# Patient Record
Sex: MALE | Race: WHITE | NOT HISPANIC OR LATINO | Employment: FULL TIME | URBAN - METROPOLITAN AREA
[De-identification: names, ages, dates, MRNs, and addresses within clinical notes are randomized per-mention and may not be internally consistent; named-entity substitution may affect disease eponyms.]

---

## 2021-06-01 ENCOUNTER — APPOINTMENT (OUTPATIENT)
Dept: ULTRASOUND IMAGING | Facility: HOSPITAL | Age: 36
End: 2021-06-01

## 2021-06-01 ENCOUNTER — APPOINTMENT (OUTPATIENT)
Dept: GENERAL RADIOLOGY | Facility: HOSPITAL | Age: 36
End: 2021-06-01

## 2021-06-01 ENCOUNTER — APPOINTMENT (OUTPATIENT)
Dept: CT IMAGING | Facility: HOSPITAL | Age: 36
End: 2021-06-01

## 2021-06-01 ENCOUNTER — HOSPITAL ENCOUNTER (INPATIENT)
Facility: HOSPITAL | Age: 36
LOS: 3 days | Discharge: HOME | End: 2021-06-04
Attending: EMERGENCY MEDICINE | Admitting: INTERNAL MEDICINE

## 2021-06-01 ENCOUNTER — APPOINTMENT (OUTPATIENT)
Dept: CARDIOLOGY | Facility: HOSPITAL | Age: 36
End: 2021-06-01

## 2021-06-01 DIAGNOSIS — K70.30 CIRRHOSIS WITH ALCOHOLISM (HCC): Primary | ICD-10-CM

## 2021-06-01 DIAGNOSIS — K70.31 ASCITES DUE TO ALCOHOLIC CIRRHOSIS (HCC): ICD-10-CM

## 2021-06-01 PROBLEM — F10.10 ALCOHOL ABUSE: Chronic | Status: ACTIVE | Noted: 2021-06-01

## 2021-06-01 PROBLEM — E80.6 HYPERBILIRUBINEMIA: Status: ACTIVE | Noted: 2021-06-01

## 2021-06-01 PROBLEM — N39.0 ACUTE UTI (URINARY TRACT INFECTION): Status: ACTIVE | Noted: 2021-06-01

## 2021-06-01 PROBLEM — R94.31 PROLONGED QT INTERVAL: Status: ACTIVE | Noted: 2021-06-01

## 2021-06-01 PROBLEM — D53.9 MACROCYTIC ANEMIA: Status: ACTIVE | Noted: 2021-06-01

## 2021-06-01 PROBLEM — D69.6 THROMBOCYTOPENIA (HCC): Status: ACTIVE | Noted: 2021-06-01

## 2021-06-01 PROBLEM — D68.9 COAGULOPATHY: Status: ACTIVE | Noted: 2021-06-01

## 2021-06-01 PROBLEM — R60.1 ANASARCA: Status: ACTIVE | Noted: 2021-06-01

## 2021-06-01 LAB
ALBUMIN SERPL-MCNC: 2.4 G/DL (ref 3.5–5.2)
ALBUMIN/GLOB SERPL: 0.5 G/DL
ALP SERPL-CCNC: 173 U/L (ref 39–117)
ALT SERPL W P-5'-P-CCNC: 36 U/L (ref 1–41)
AMMONIA BLD-SCNC: 25 UMOL/L (ref 16–60)
ANION GAP SERPL CALCULATED.3IONS-SCNC: 10 MMOL/L (ref 5–15)
APTT PPP: 33.6 SECONDS (ref 24–31)
AST SERPL-CCNC: 56 U/L (ref 1–40)
BACTERIA UR QL AUTO: ABNORMAL /HPF
BASOPHILS # BLD AUTO: 0 10*3/MM3 (ref 0–0.2)
BASOPHILS NFR BLD AUTO: 0.4 % (ref 0–1.5)
BH CV ECHO MEAS - ACS: 2.5 CM
BH CV ECHO MEAS - AO MAX PG (FULL): 14.2 MMHG
BH CV ECHO MEAS - AO MAX PG: 25.6 MMHG
BH CV ECHO MEAS - AO MEAN PG (FULL): 6.5 MMHG
BH CV ECHO MEAS - AO MEAN PG: 15 MMHG
BH CV ECHO MEAS - AO ROOT AREA (BSA CORRECTED): 1.1
BH CV ECHO MEAS - AO ROOT AREA: 6.1 CM^2
BH CV ECHO MEAS - AO ROOT DIAM: 2.8 CM
BH CV ECHO MEAS - AO V2 MAX: 253.1 CM/SEC
BH CV ECHO MEAS - AO V2 MEAN: 183.4 CM/SEC
BH CV ECHO MEAS - AO V2 VTI: 41.9 CM
BH CV ECHO MEAS - AORTIC HR: 120.3 BPM
BH CV ECHO MEAS - AORTIC R-R: 0.5 SEC
BH CV ECHO MEAS - ASC AORTA: 2.7 CM
BH CV ECHO MEAS - AVA(I,A): 3 CM^2
BH CV ECHO MEAS - AVA(I,D): 3 CM^2
BH CV ECHO MEAS - AVA(V,A): 2.2 CM^2
BH CV ECHO MEAS - AVA(V,D): 2.2 CM^2
BH CV ECHO MEAS - BSA(HAYCOCK): 2.7 M^2
BH CV ECHO MEAS - BSA: 2.6 M^2
BH CV ECHO MEAS - BZI_BMI: 39.4 KILOGRAMS/M^2
BH CV ECHO MEAS - BZI_METRIC_HEIGHT: 185.4 CM
BH CV ECHO MEAS - BZI_METRIC_WEIGHT: 135.6 KG
BH CV ECHO MEAS - CI(AO): 12 L/MIN/M^2
BH CV ECHO MEAS - CI(LVOT): 5.9 L/MIN/M^2
BH CV ECHO MEAS - CO(AO): 30.7 L/MIN
BH CV ECHO MEAS - CO(LVOT): 15.1 L/MIN
BH CV ECHO MEAS - EDV(CUBED): 34 ML
BH CV ECHO MEAS - EDV(MOD-SP2): 65.5 ML
BH CV ECHO MEAS - EDV(MOD-SP4): 76.3 ML
BH CV ECHO MEAS - EDV(TEICH): 42.2 ML
BH CV ECHO MEAS - EF(CUBED): 68 %
BH CV ECHO MEAS - EF(MOD-BP): 57 %
BH CV ECHO MEAS - EF(MOD-SP2): 63.1 %
BH CV ECHO MEAS - EF(MOD-SP4): 60.1 %
BH CV ECHO MEAS - EF(TEICH): 60.9 %
BH CV ECHO MEAS - ESV(CUBED): 10.9 ML
BH CV ECHO MEAS - ESV(MOD-SP2): 24.2 ML
BH CV ECHO MEAS - ESV(MOD-SP4): 30.4 ML
BH CV ECHO MEAS - ESV(TEICH): 16.5 ML
BH CV ECHO MEAS - FS: 31.6 %
BH CV ECHO MEAS - IVS/LVPW: 0.93
BH CV ECHO MEAS - IVSD: 1.5 CM
BH CV ECHO MEAS - LA DIMENSION(2D): 4.3 CM
BH CV ECHO MEAS - LV DIASTOLIC VOL/BSA (35-75): 29.9 ML/M^2
BH CV ECHO MEAS - LV MASS(C)D: 192.5 GRAMS
BH CV ECHO MEAS - LV MASS(C)DI: 75.4 GRAMS/M^2
BH CV ECHO MEAS - LV MAX PG: 11.4 MMHG
BH CV ECHO MEAS - LV MEAN PG: 8.5 MMHG
BH CV ECHO MEAS - LV SYSTOLIC VOL/BSA (12-30): 11.9 ML/M^2
BH CV ECHO MEAS - LV V1 MAX: 168.9 CM/SEC
BH CV ECHO MEAS - LV V1 MEAN: 141.6 CM/SEC
BH CV ECHO MEAS - LV V1 VTI: 38.8 CM
BH CV ECHO MEAS - LVIDD: 3.2 CM
BH CV ECHO MEAS - LVIDS: 2.2 CM
BH CV ECHO MEAS - LVOT AREA: 3.2 CM^2
BH CV ECHO MEAS - LVOT DIAM: 2 CM
BH CV ECHO MEAS - LVPWD: 1.6 CM
BH CV ECHO MEAS - MV A MAX VEL: 124.1 CM/SEC
BH CV ECHO MEAS - MV DEC SLOPE: 404.3 CM/SEC^2
BH CV ECHO MEAS - MV DEC TIME: 0.25 SEC
BH CV ECHO MEAS - MV E MAX VEL: 101.2 CM/SEC
BH CV ECHO MEAS - MV E/A: 0.82
BH CV ECHO MEAS - MV MAX PG: 6 MMHG
BH CV ECHO MEAS - MV MEAN PG: 3.6 MMHG
BH CV ECHO MEAS - MV V2 MAX: 123 CM/SEC
BH CV ECHO MEAS - MV V2 MEAN: 91.2 CM/SEC
BH CV ECHO MEAS - MV V2 VTI: 20.5 CM
BH CV ECHO MEAS - MVA(VTI): 6.1 CM^2
BH CV ECHO MEAS - PA ACC TIME: 0.12 SEC
BH CV ECHO MEAS - PA MAX PG (FULL): 7.3 MMHG
BH CV ECHO MEAS - PA MAX PG: 12.5 MMHG
BH CV ECHO MEAS - PA MEAN PG (FULL): 4.5 MMHG
BH CV ECHO MEAS - PA MEAN PG: 7.6 MMHG
BH CV ECHO MEAS - PA PR(ACCEL): 24.2 MMHG
BH CV ECHO MEAS - PA V2 MAX: 176.9 CM/SEC
BH CV ECHO MEAS - PA V2 MEAN: 129.3 CM/SEC
BH CV ECHO MEAS - PA V2 VTI: 29 CM
BH CV ECHO MEAS - PULM DIAS VEL: 57.8 CM/SEC
BH CV ECHO MEAS - PULM S/D: 1.5
BH CV ECHO MEAS - PULM SYS VEL: 84.2 CM/SEC
BH CV ECHO MEAS - PVA(I,A): 8.3 CM^2
BH CV ECHO MEAS - PVA(I,D): 8.3 CM^2
BH CV ECHO MEAS - PVA(V,A): 6.9 CM^2
BH CV ECHO MEAS - PVA(V,D): 6.9 CM^2
BH CV ECHO MEAS - QP/QS: 1.9
BH CV ECHO MEAS - RAP SYSTOLE: 3 MMHG
BH CV ECHO MEAS - RV MAX PG: 5.2 MMHG
BH CV ECHO MEAS - RV MEAN PG: 3.1 MMHG
BH CV ECHO MEAS - RV V1 MAX: 113.7 CM/SEC
BH CV ECHO MEAS - RV V1 MEAN: 79.7 CM/SEC
BH CV ECHO MEAS - RV V1 VTI: 22.5 CM
BH CV ECHO MEAS - RVDD: 2.8 CM
BH CV ECHO MEAS - RVOT AREA: 10.8 CM^2
BH CV ECHO MEAS - RVOT DIAM: 3.7 CM
BH CV ECHO MEAS - RVSP: 53.1 MMHG
BH CV ECHO MEAS - SI(AO): 99.8 ML/M^2
BH CV ECHO MEAS - SI(CUBED): 9.1 ML/M^2
BH CV ECHO MEAS - SI(LVOT): 49.1 ML/M^2
BH CV ECHO MEAS - SI(MOD-SP2): 16.2 ML/M^2
BH CV ECHO MEAS - SI(MOD-SP4): 18 ML/M^2
BH CV ECHO MEAS - SI(TEICH): 10.1 ML/M^2
BH CV ECHO MEAS - SV(AO): 254.9 ML
BH CV ECHO MEAS - SV(CUBED): 23.1 ML
BH CV ECHO MEAS - SV(LVOT): 125.3 ML
BH CV ECHO MEAS - SV(MOD-SP2): 41.3 ML
BH CV ECHO MEAS - SV(MOD-SP4): 45.9 ML
BH CV ECHO MEAS - SV(RVOT): 242.4 ML
BH CV ECHO MEAS - SV(TEICH): 25.7 ML
BH CV ECHO MEAS - TR MAX VEL: 353.7 CM/SEC
BILIRUB SERPL-MCNC: 4.8 MG/DL (ref 0–1.2)
BILIRUB UR QL STRIP: ABNORMAL
BUN SERPL-MCNC: 22 MG/DL (ref 6–20)
BUN/CREAT SERPL: 17.9 (ref 7–25)
CALCIUM SPEC-SCNC: 8.7 MG/DL (ref 8.6–10.5)
CHLORIDE SERPL-SCNC: 101 MMOL/L (ref 98–107)
CLARITY UR: ABNORMAL
CO2 SERPL-SCNC: 23 MMOL/L (ref 22–29)
COLOR UR: ABNORMAL
CREAT SERPL-MCNC: 1.23 MG/DL (ref 0.76–1.27)
DEPRECATED RDW RBC AUTO: 55.1 FL (ref 37–54)
EOSINOPHIL # BLD AUTO: 0.5 10*3/MM3 (ref 0–0.4)
EOSINOPHIL NFR BLD AUTO: 7.9 % (ref 0.3–6.2)
ERYTHROCYTE [DISTWIDTH] IN BLOOD BY AUTOMATED COUNT: 15.6 % (ref 12.3–15.4)
ETHANOL UR QL: <0.01 %
FOLATE SERPL-MCNC: 4.01 NG/ML (ref 4.78–24.2)
GFR SERPL CREATININE-BSD FRML MDRD: 67 ML/MIN/1.73
GLOBULIN UR ELPH-MCNC: 5.2 GM/DL
GLUCOSE SERPL-MCNC: 92 MG/DL (ref 65–99)
GLUCOSE UR STRIP-MCNC: NEGATIVE MG/DL
HCT VFR BLD AUTO: 32 % (ref 37.5–51)
HGB BLD-MCNC: 11.1 G/DL (ref 13–17.7)
HGB UR QL STRIP.AUTO: ABNORMAL
HOLD SPECIMEN: NORMAL
HOLD SPECIMEN: NORMAL
HYALINE CASTS UR QL AUTO: ABNORMAL /LPF
INR PPP: 1.41 (ref 0.93–1.1)
KETONES UR QL STRIP: ABNORMAL
LEUKOCYTE ESTERASE UR QL STRIP.AUTO: ABNORMAL
LIPASE SERPL-CCNC: 72 U/L (ref 13–60)
LYMPHOCYTES # BLD AUTO: 0.8 10*3/MM3 (ref 0.7–3.1)
LYMPHOCYTES NFR BLD AUTO: 13.1 % (ref 19.6–45.3)
MAGNESIUM SERPL-MCNC: 1.9 MG/DL (ref 1.6–2.6)
MCH RBC QN AUTO: 35.6 PG (ref 26.6–33)
MCHC RBC AUTO-ENTMCNC: 34.6 G/DL (ref 31.5–35.7)
MCV RBC AUTO: 103 FL (ref 79–97)
MONOCYTES # BLD AUTO: 1 10*3/MM3 (ref 0.1–0.9)
MONOCYTES NFR BLD AUTO: 15.7 % (ref 5–12)
NEUTROPHILS NFR BLD AUTO: 4 10*3/MM3 (ref 1.7–7)
NEUTROPHILS NFR BLD AUTO: 62.9 % (ref 42.7–76)
NITRITE UR QL STRIP: POSITIVE
NRBC BLD AUTO-RTO: 0.1 /100 WBC (ref 0–0.2)
PH UR STRIP.AUTO: 5.5 [PH] (ref 5–8)
PLATELET # BLD AUTO: 63 10*3/MM3 (ref 140–450)
PMV BLD AUTO: 7.8 FL (ref 6–12)
POTASSIUM SERPL-SCNC: 4.5 MMOL/L (ref 3.5–5.2)
PROCALCITONIN SERPL-MCNC: 0.11 NG/ML (ref 0–0.25)
PROT SERPL-MCNC: 7.6 G/DL (ref 6–8.5)
PROT UR QL STRIP: ABNORMAL
PROTHROMBIN TIME: 15.3 SECONDS (ref 9.6–11.7)
RBC # BLD AUTO: 3.11 10*6/MM3 (ref 4.14–5.8)
RBC # UR: ABNORMAL /HPF
REF LAB TEST METHOD: ABNORMAL
SODIUM SERPL-SCNC: 134 MMOL/L (ref 136–145)
SP GR UR STRIP: >=1.03 (ref 1–1.03)
SQUAMOUS #/AREA URNS HPF: ABNORMAL /HPF
TSH SERPL DL<=0.05 MIU/L-ACNC: 4.11 UIU/ML (ref 0.27–4.2)
UROBILINOGEN UR QL STRIP: ABNORMAL
VIT B12 BLD-MCNC: 1309 PG/ML (ref 211–946)
WBC # BLD AUTO: 6.3 10*3/MM3 (ref 3.4–10.8)
WBC UR QL AUTO: ABNORMAL /HPF
WHOLE BLOOD HOLD SPECIMEN: NORMAL

## 2021-06-01 PROCEDURE — 87086 URINE CULTURE/COLONY COUNT: CPT | Performed by: NURSE PRACTITIONER

## 2021-06-01 PROCEDURE — 74176 CT ABD & PELVIS W/O CONTRAST: CPT

## 2021-06-01 PROCEDURE — 49083 ABD PARACENTESIS W/IMAGING: CPT | Performed by: HOSPITALIST

## 2021-06-01 PROCEDURE — 76705 ECHO EXAM OF ABDOMEN: CPT

## 2021-06-01 PROCEDURE — 84443 ASSAY THYROID STIM HORMONE: CPT | Performed by: NURSE PRACTITIONER

## 2021-06-01 PROCEDURE — 81340 TRB@ GENE REARRANGE AMPLIFY: CPT

## 2021-06-01 PROCEDURE — 25010000002 CEFTRIAXONE PER 250 MG: Performed by: NURSE PRACTITIONER

## 2021-06-01 PROCEDURE — 93306 TTE W/DOPPLER COMPLETE: CPT | Performed by: INTERNAL MEDICINE

## 2021-06-01 PROCEDURE — 84145 PROCALCITONIN (PCT): CPT | Performed by: NURSE PRACTITIONER

## 2021-06-01 PROCEDURE — 82077 ASSAY SPEC XCP UR&BREATH IA: CPT | Performed by: EMERGENCY MEDICINE

## 2021-06-01 PROCEDURE — 85730 THROMBOPLASTIN TIME PARTIAL: CPT | Performed by: EMERGENCY MEDICINE

## 2021-06-01 PROCEDURE — 93306 TTE W/DOPPLER COMPLETE: CPT

## 2021-06-01 PROCEDURE — 93005 ELECTROCARDIOGRAM TRACING: CPT | Performed by: EMERGENCY MEDICINE

## 2021-06-01 PROCEDURE — 82607 VITAMIN B-12: CPT | Performed by: NURSE PRACTITIONER

## 2021-06-01 PROCEDURE — 81342 TRG GENE REARRANGEMENT ANAL: CPT

## 2021-06-01 PROCEDURE — 25010000002 FUROSEMIDE PER 20 MG: Performed by: NURSE PRACTITIONER

## 2021-06-01 PROCEDURE — 87070 CULTURE OTHR SPECIMN AEROBIC: CPT | Performed by: NURSE PRACTITIONER

## 2021-06-01 PROCEDURE — 83735 ASSAY OF MAGNESIUM: CPT | Performed by: EMERGENCY MEDICINE

## 2021-06-01 PROCEDURE — 88184 FLOWCYTOMETRY/ TC 1 MARKER: CPT

## 2021-06-01 PROCEDURE — 88185 FLOWCYTOMETRY/TC ADD-ON: CPT

## 2021-06-01 PROCEDURE — 71045 X-RAY EXAM CHEST 1 VIEW: CPT

## 2021-06-01 PROCEDURE — 83690 ASSAY OF LIPASE: CPT | Performed by: EMERGENCY MEDICINE

## 2021-06-01 PROCEDURE — 87075 CULTR BACTERIA EXCEPT BLOOD: CPT | Performed by: NURSE PRACTITIONER

## 2021-06-01 PROCEDURE — 87205 SMEAR GRAM STAIN: CPT | Performed by: NURSE PRACTITIONER

## 2021-06-01 PROCEDURE — 85025 COMPLETE CBC W/AUTO DIFF WBC: CPT | Performed by: EMERGENCY MEDICINE

## 2021-06-01 PROCEDURE — 99284 EMERGENCY DEPT VISIT MOD MDM: CPT

## 2021-06-01 PROCEDURE — 82140 ASSAY OF AMMONIA: CPT | Performed by: NURSE PRACTITIONER

## 2021-06-01 PROCEDURE — 99223 1ST HOSP IP/OBS HIGH 75: CPT | Performed by: INTERNAL MEDICINE

## 2021-06-01 PROCEDURE — 85610 PROTHROMBIN TIME: CPT | Performed by: EMERGENCY MEDICINE

## 2021-06-01 PROCEDURE — 82746 ASSAY OF FOLIC ACID SERUM: CPT | Performed by: NURSE PRACTITIONER

## 2021-06-01 PROCEDURE — 80053 COMPREHEN METABOLIC PANEL: CPT | Performed by: EMERGENCY MEDICINE

## 2021-06-01 PROCEDURE — 0W9G3ZZ DRAINAGE OF PERITONEAL CAVITY, PERCUTANEOUS APPROACH: ICD-10-PCS | Performed by: HOSPITALIST

## 2021-06-01 PROCEDURE — 81001 URINALYSIS AUTO W/SCOPE: CPT | Performed by: EMERGENCY MEDICINE

## 2021-06-01 RX ORDER — DIPHENOXYLATE HYDROCHLORIDE AND ATROPINE SULFATE 2.5; .025 MG/1; MG/1
1 TABLET ORAL DAILY
Status: DISCONTINUED | OUTPATIENT
Start: 2021-06-01 | End: 2021-06-04 | Stop reason: HOSPADM

## 2021-06-01 RX ORDER — POTASSIUM CHLORIDE 1.5 G/1.77G
40 POWDER, FOR SOLUTION ORAL AS NEEDED
Status: DISCONTINUED | OUTPATIENT
Start: 2021-06-01 | End: 2021-06-04 | Stop reason: HOSPADM

## 2021-06-01 RX ORDER — ALBUMIN (HUMAN) 12.5 G/50ML
112.5 SOLUTION INTRAVENOUS ONCE
Status: COMPLETED | OUTPATIENT
Start: 2021-06-02 | End: 2021-06-02

## 2021-06-01 RX ORDER — ACETAMINOPHEN 325 MG/1
650 TABLET ORAL EVERY 4 HOURS PRN
Status: DISCONTINUED | OUTPATIENT
Start: 2021-06-01 | End: 2021-06-04 | Stop reason: HOSPADM

## 2021-06-01 RX ORDER — ALBUMIN (HUMAN) 12.5 G/50ML
37.5 SOLUTION INTRAVENOUS ONCE
Status: DISCONTINUED | OUTPATIENT
Start: 2021-06-01 | End: 2021-06-01

## 2021-06-01 RX ORDER — SODIUM CHLORIDE 0.9 % (FLUSH) 0.9 %
10 SYRINGE (ML) INJECTION EVERY 12 HOURS SCHEDULED
Status: DISCONTINUED | OUTPATIENT
Start: 2021-06-01 | End: 2021-06-04 | Stop reason: HOSPADM

## 2021-06-01 RX ORDER — ALBUMIN (HUMAN) 12.5 G/50ML
62.5 SOLUTION INTRAVENOUS ONCE
Status: COMPLETED | OUTPATIENT
Start: 2021-06-02 | End: 2021-06-02

## 2021-06-01 RX ORDER — LORAZEPAM 2 MG/ML
2 INJECTION INTRAMUSCULAR
Status: DISCONTINUED | OUTPATIENT
Start: 2021-06-01 | End: 2021-06-04 | Stop reason: HOSPADM

## 2021-06-01 RX ORDER — MAGNESIUM SULFATE 1 G/100ML
1 INJECTION INTRAVENOUS AS NEEDED
Status: DISCONTINUED | OUTPATIENT
Start: 2021-06-01 | End: 2021-06-04 | Stop reason: HOSPADM

## 2021-06-01 RX ORDER — ALBUMIN (HUMAN) 12.5 G/50ML
50 SOLUTION INTRAVENOUS ONCE
Status: COMPLETED | OUTPATIENT
Start: 2021-06-02 | End: 2021-06-02

## 2021-06-01 RX ORDER — SODIUM CHLORIDE 0.9 % (FLUSH) 0.9 %
10 SYRINGE (ML) INJECTION AS NEEDED
Status: DISCONTINUED | OUTPATIENT
Start: 2021-06-01 | End: 2021-06-04 | Stop reason: HOSPADM

## 2021-06-01 RX ORDER — POTASSIUM CHLORIDE 7.45 MG/ML
10 INJECTION INTRAVENOUS
Status: DISCONTINUED | OUTPATIENT
Start: 2021-06-01 | End: 2021-06-04 | Stop reason: HOSPADM

## 2021-06-01 RX ORDER — ALBUMIN (HUMAN) 12.5 G/50ML
100 SOLUTION INTRAVENOUS ONCE
Status: DISCONTINUED | OUTPATIENT
Start: 2021-06-01 | End: 2021-06-01

## 2021-06-01 RX ORDER — LORAZEPAM 2 MG/ML
1 INJECTION INTRAMUSCULAR
Status: DISCONTINUED | OUTPATIENT
Start: 2021-06-01 | End: 2021-06-04 | Stop reason: HOSPADM

## 2021-06-01 RX ORDER — FOLIC ACID 1 MG/1
1 TABLET ORAL DAILY
Status: DISCONTINUED | OUTPATIENT
Start: 2021-06-01 | End: 2021-06-04 | Stop reason: HOSPADM

## 2021-06-01 RX ORDER — LACTULOSE 10 G/15ML
10 SOLUTION ORAL DAILY
Status: DISCONTINUED | OUTPATIENT
Start: 2021-06-01 | End: 2021-06-04 | Stop reason: HOSPADM

## 2021-06-01 RX ORDER — POTASSIUM CHLORIDE 20 MEQ/1
40 TABLET, EXTENDED RELEASE ORAL AS NEEDED
Status: DISCONTINUED | OUTPATIENT
Start: 2021-06-01 | End: 2021-06-04 | Stop reason: HOSPADM

## 2021-06-01 RX ORDER — ALBUMIN (HUMAN) 12.5 G/50ML
100 SOLUTION INTRAVENOUS ONCE
Status: COMPLETED | OUTPATIENT
Start: 2021-06-02 | End: 2021-06-02

## 2021-06-01 RX ORDER — ALBUMIN (HUMAN) 12.5 G/50ML
112.5 SOLUTION INTRAVENOUS ONCE
Status: DISCONTINUED | OUTPATIENT
Start: 2021-06-01 | End: 2021-06-01

## 2021-06-01 RX ORDER — ALBUMIN (HUMAN) 12.5 G/50ML
75 SOLUTION INTRAVENOUS ONCE
Status: COMPLETED | OUTPATIENT
Start: 2021-06-02 | End: 2021-06-02

## 2021-06-01 RX ORDER — CHOLECALCIFEROL (VITAMIN D3) 125 MCG
5 CAPSULE ORAL NIGHTLY PRN
Status: DISCONTINUED | OUTPATIENT
Start: 2021-06-01 | End: 2021-06-04 | Stop reason: HOSPADM

## 2021-06-01 RX ORDER — ALBUMIN (HUMAN) 12.5 G/50ML
37.5 SOLUTION INTRAVENOUS ONCE
Status: COMPLETED | OUTPATIENT
Start: 2021-06-02 | End: 2021-06-02

## 2021-06-01 RX ORDER — LORAZEPAM 1 MG/1
2 TABLET ORAL
Status: DISCONTINUED | OUTPATIENT
Start: 2021-06-01 | End: 2021-06-04 | Stop reason: HOSPADM

## 2021-06-01 RX ORDER — ALBUMIN (HUMAN) 12.5 G/50ML
50 SOLUTION INTRAVENOUS ONCE
Status: DISCONTINUED | OUTPATIENT
Start: 2021-06-01 | End: 2021-06-01

## 2021-06-01 RX ORDER — ALBUMIN (HUMAN) 12.5 G/50ML
87.5 SOLUTION INTRAVENOUS ONCE
Status: COMPLETED | OUTPATIENT
Start: 2021-06-02 | End: 2021-06-02

## 2021-06-01 RX ORDER — NITROGLYCERIN 0.4 MG/1
0.4 TABLET SUBLINGUAL
Status: DISCONTINUED | OUTPATIENT
Start: 2021-06-01 | End: 2021-06-04 | Stop reason: HOSPADM

## 2021-06-01 RX ORDER — ALBUMIN (HUMAN) 12.5 G/50ML
62.5 SOLUTION INTRAVENOUS ONCE
Status: DISCONTINUED | OUTPATIENT
Start: 2021-06-01 | End: 2021-06-01

## 2021-06-01 RX ORDER — ACETAMINOPHEN 650 MG/1
650 SUPPOSITORY RECTAL EVERY 4 HOURS PRN
Status: DISCONTINUED | OUTPATIENT
Start: 2021-06-01 | End: 2021-06-04 | Stop reason: HOSPADM

## 2021-06-01 RX ORDER — ALUMINA, MAGNESIA, AND SIMETHICONE 2400; 2400; 240 MG/30ML; MG/30ML; MG/30ML
15 SUSPENSION ORAL EVERY 6 HOURS PRN
Status: DISCONTINUED | OUTPATIENT
Start: 2021-06-01 | End: 2021-06-04 | Stop reason: HOSPADM

## 2021-06-01 RX ORDER — ACETAMINOPHEN 160 MG/5ML
650 SOLUTION ORAL EVERY 4 HOURS PRN
Status: DISCONTINUED | OUTPATIENT
Start: 2021-06-01 | End: 2021-06-04 | Stop reason: HOSPADM

## 2021-06-01 RX ORDER — ONDANSETRON 4 MG/1
4 TABLET, FILM COATED ORAL EVERY 6 HOURS PRN
Status: DISCONTINUED | OUTPATIENT
Start: 2021-06-01 | End: 2021-06-04 | Stop reason: HOSPADM

## 2021-06-01 RX ORDER — ONDANSETRON 2 MG/ML
4 INJECTION INTRAMUSCULAR; INTRAVENOUS EVERY 6 HOURS PRN
Status: DISCONTINUED | OUTPATIENT
Start: 2021-06-01 | End: 2021-06-04 | Stop reason: HOSPADM

## 2021-06-01 RX ORDER — ALBUMIN (HUMAN) 12.5 G/50ML
87.5 SOLUTION INTRAVENOUS ONCE
Status: DISCONTINUED | OUTPATIENT
Start: 2021-06-01 | End: 2021-06-01

## 2021-06-01 RX ORDER — KETOROLAC TROMETHAMINE 15 MG/ML
15 INJECTION, SOLUTION INTRAMUSCULAR; INTRAVENOUS EVERY 6 HOURS PRN
Status: ACTIVE | OUTPATIENT
Start: 2021-06-01 | End: 2021-06-03

## 2021-06-01 RX ORDER — MAGNESIUM SULFATE HEPTAHYDRATE 40 MG/ML
2 INJECTION, SOLUTION INTRAVENOUS AS NEEDED
Status: DISCONTINUED | OUTPATIENT
Start: 2021-06-01 | End: 2021-06-04 | Stop reason: HOSPADM

## 2021-06-01 RX ORDER — ALBUMIN (HUMAN) 12.5 G/50ML
75 SOLUTION INTRAVENOUS ONCE
Status: DISCONTINUED | OUTPATIENT
Start: 2021-06-01 | End: 2021-06-01

## 2021-06-01 RX ORDER — FUROSEMIDE 10 MG/ML
40 INJECTION INTRAMUSCULAR; INTRAVENOUS EVERY 8 HOURS
Status: DISCONTINUED | OUTPATIENT
Start: 2021-06-01 | End: 2021-06-02

## 2021-06-01 RX ORDER — LORAZEPAM 1 MG/1
1 TABLET ORAL
Status: DISCONTINUED | OUTPATIENT
Start: 2021-06-01 | End: 2021-06-04 | Stop reason: HOSPADM

## 2021-06-01 RX ADMIN — FOLIC ACID 1 MG: 1 TABLET ORAL at 17:31

## 2021-06-01 RX ADMIN — Medication 10 ML: at 20:32

## 2021-06-01 RX ADMIN — FUROSEMIDE 40 MG: 10 INJECTION, SOLUTION INTRAMUSCULAR; INTRAVENOUS at 17:32

## 2021-06-01 RX ADMIN — Medication 10 ML: at 17:33

## 2021-06-01 RX ADMIN — LACTULOSE 10 G: 20 SOLUTION ORAL at 17:32

## 2021-06-01 RX ADMIN — Medication 100 MG: at 17:33

## 2021-06-01 RX ADMIN — CEFTRIAXONE SODIUM 1 G: 1 INJECTION, POWDER, FOR SOLUTION INTRAMUSCULAR; INTRAVENOUS at 17:39

## 2021-06-01 RX ADMIN — THERA TABS 1 TABLET: TAB at 17:33

## 2021-06-01 NOTE — PROCEDURES
Ultrasound guided paracentesis    Date/Time: 6/1/2021 2:30 PM  Performed by: Bentley Best MD  Authorized by: Eneida Daniel APRN   Initial or subsequent exam: initial  Procedure purpose: diagnostic  Indications: abdominal discomfort secondary to ascites and new onset ascites  Anesthesia: local infiltration    Anesthesia:  Local Anesthetic: lidocaine 1% without epinephrine  Preparation: Patient was prepped and draped in the usual sterile fashion.  Needle gauge: 20  Ultrasound guidance: yes  Puncture site: right lower quadrant  Fluid appearance: serous  Dressing: 4x4 sterile gauze  Patient tolerance: patient tolerated the procedure well with no immediate complications  Comments: 40 ml of fluid taken as per ordering physician request for diagnostic analysis.

## 2021-06-01 NOTE — ED PROVIDER NOTES
Subjective   History of Present Illness  5-year-old male has noticed swelling in his abdomen and his legs over the past 2 to 3 weeks and has had about a 30 pound weight gain.  The patient denies any history of cirrhosis but does have a history of heavy drinking.  Patient has noticed no jaundice.  He denies any nausea vomiting fever or chills.  He denies any other medical problems  Review of Systems   Constitutional: Positive for activity change, appetite change and fatigue.   HENT: Negative.    Eyes: Negative.    Respiratory: Positive for shortness of breath.    Cardiovascular: Negative.    Gastrointestinal: Positive for abdominal distention and abdominal pain.   Endocrine: Negative.    Genitourinary: Negative.    Musculoskeletal: Positive for myalgias.   Skin: Negative.    Allergic/Immunologic: Negative.    Neurological: Negative.    Hematological: Negative.    Psychiatric/Behavioral: Negative.        No past medical history on file.    No Known Allergies    No past surgical history on file.    No family history on file.    Social History     Socioeconomic History   • Marital status: Single     Spouse name: Not on file   • Number of children: Not on file   • Years of education: Not on file   • Highest education level: Not on file           Objective   Physical Exam  Patient is awake and alert she is afebrile vital signs are stable his heart rate is 122 the O2 sats are 100% HEENT exam shows mild scleral icterus ENT is clear the neck is supple his chest reveals some rales in the bases cardiovascular exam reveals a regular rhythm the abdomen is extremely distended with ascites the ascites prevents palpation of any of the organs.  The patient has pitting edema of both lower extremities.  He has good distal pulses.  Neurologic exam shows no focal deficit.  Procedures           ED Course           Results for orders placed or performed during the hospital encounter of 06/01/21   Comprehensive Metabolic Panel    Specimen:  Blood   Result Value Ref Range    Glucose 92 65 - 99 mg/dL    BUN 22 (H) 6 - 20 mg/dL    Creatinine 1.23 0.76 - 1.27 mg/dL    Sodium 134 (L) 136 - 145 mmol/L    Potassium 4.5 3.5 - 5.2 mmol/L    Chloride 101 98 - 107 mmol/L    CO2 23.0 22.0 - 29.0 mmol/L    Calcium 8.7 8.6 - 10.5 mg/dL    Total Protein 7.6 6.0 - 8.5 g/dL    Albumin 2.40 (L) 3.50 - 5.20 g/dL    ALT (SGPT) 36 1 - 41 U/L    AST (SGOT) 56 (H) 1 - 40 U/L    Alkaline Phosphatase 173 (H) 39 - 117 U/L    Total Bilirubin 4.8 (H) 0.0 - 1.2 mg/dL    eGFR Non African Amer 67 >60 mL/min/1.73    Globulin 5.2 gm/dL    A/G Ratio 0.5 g/dL    BUN/Creatinine Ratio 17.9 7.0 - 25.0    Anion Gap 10.0 5.0 - 15.0 mmol/L   Protime-INR    Specimen: Blood   Result Value Ref Range    Protime 15.3 (H) 9.6 - 11.7 Seconds    INR 1.41 (H) 0.93 - 1.10   aPTT    Specimen: Blood   Result Value Ref Range    PTT 33.6 (H) 24.0 - 31.0 seconds   Lipase    Specimen: Blood   Result Value Ref Range    Lipase 72 (H) 13 - 60 U/L   Urinalysis With Microscopic If Indicated (No Culture) - Urine, Clean Catch    Specimen: Urine, Clean Catch   Result Value Ref Range    Color, UA Red (A) Yellow, Straw    Appearance, UA Cloudy (A) Clear    pH, UA 5.5 5.0 - 8.0    Specific Gravity, UA >=1.030 1.005 - 1.030    Glucose, UA Negative Negative    Ketones, UA 15 mg/dL (1+) (A) Negative    Bilirubin, UA Moderate (2+) (A) Negative    Blood, UA Large (3+) (A) Negative    Protein, UA 30 mg/dL (1+) (A) Negative    Leuk Esterase, UA Small (1+) (A) Negative    Nitrite, UA Positive (A) Negative    Urobilinogen, UA 1.0 E.U./dL 0.2 - 1.0 E.U./dL   Ethanol    Specimen: Blood   Result Value Ref Range    Ethanol % <0.010 %   Magnesium    Specimen: Blood   Result Value Ref Range    Magnesium 1.9 1.6 - 2.6 mg/dL   CBC Auto Differential    Specimen: Blood   Result Value Ref Range    WBC 6.30 3.40 - 10.80 10*3/mm3    RBC 3.11 (L) 4.14 - 5.80 10*6/mm3    Hemoglobin 11.1 (L) 13.0 - 17.7 g/dL    Hematocrit 32.0 (L) 37.5 - 51.0  %    .0 (H) 79.0 - 97.0 fL    MCH 35.6 (H) 26.6 - 33.0 pg    MCHC 34.6 31.5 - 35.7 g/dL    RDW 15.6 (H) 12.3 - 15.4 %    RDW-SD 55.1 (H) 37.0 - 54.0 fl    MPV 7.8 6.0 - 12.0 fL    Platelets 63 (L) 140 - 450 10*3/mm3    Neutrophil % 62.9 42.7 - 76.0 %    Lymphocyte % 13.1 (L) 19.6 - 45.3 %    Monocyte % 15.7 (H) 5.0 - 12.0 %    Eosinophil % 7.9 (H) 0.3 - 6.2 %    Basophil % 0.4 0.0 - 1.5 %    Neutrophils, Absolute 4.00 1.70 - 7.00 10*3/mm3    Lymphocytes, Absolute 0.80 0.70 - 3.10 10*3/mm3    Monocytes, Absolute 1.00 (H) 0.10 - 0.90 10*3/mm3    Eosinophils, Absolute 0.50 (H) 0.00 - 0.40 10*3/mm3    Basophils, Absolute 0.00 0.00 - 0.20 10*3/mm3    nRBC 0.1 0.0 - 0.2 /100 WBC   Urinalysis, Microscopic Only - Urine, Clean Catch    Specimen: Urine, Clean Catch   Result Value Ref Range    RBC, UA 31-50 (A) None Seen /HPF    WBC, UA 3-5 (A) None Seen /HPF    Bacteria, UA Trace (A) None Seen /HPF    Squamous Epithelial Cells, UA None Seen None Seen, 0-2 /HPF    Hyaline Casts, UA None Seen None Seen /LPF    Methodology Manual Light Microscopy    ECG 12 Lead   Result Value Ref Range    QT Interval 382 ms   Light Blue Top   Result Value Ref Range    Extra Tube hold for add-on    Green Top (Gel)   Result Value Ref Range    Extra Tube Hold for add-ons.    Gold Top - SST   Result Value Ref Range    Extra Tube Hold for add-ons.      Medications - No data to display  CT Abdomen Pelvis Without Contrast    Result Date: 6/1/2021  Advanced cirrhosis with splenomegaly, large volume diffuse ascites and marked anasarca.  Electronically Signed By-Rocael Kinsey MD On:6/1/2021 1:23 PM This report was finalized on [id number] by  Rocael Kinsey MD.    XR Chest 1 View    Result Date: 6/1/2021  Low lung volumes without acute cardiopulmonary abnormality.  Electronically Signed By-Rocael Kinsey MD On:6/1/2021 12:02 PM This report was finalized on [id number] by  Rocael Kinsey MD.                                    MDM  The  patient's labs and CTs were consistent with cirrhosis with a large amount of ascites.  His ALT was 36 his AST was 56 his total bilirubin was 4.8.  Final diagnoses:   Cirrhosis with alcoholism (CMS/HCC)   Ascites due to alcoholic cirrhosis (CMS/HCC)       ED Disposition  ED Disposition     None          No follow-up provider specified.       Medication List      No changes were made to your prescriptions during this visit.          Yogi Morgan MD  06/01/21 9311

## 2021-06-01 NOTE — H&P
"      [facility] Medicine Services      Patient Name: Adam Alvares Jr.  : 1985  MRN: [id number]  Primary Care Physician: Lloyd, No Known  Date of admission: 2021    Patient Care Team:  Provider, No Known as PCP - General          Subjective   History Present Illness     Chief Complaint:  Swelling of legs and abdomen      Mr. Alvares is a 35 y.o. male with a history of alcohol abuse who presents to [facility] ED on 2021 complaining of swelling of his legs and abdomen. He states the swelling started approximately 4 weeks ago and has gotten progressively worse. He reports associated shortness of breath, but denies chest pain, cough, congestion, fever, or chills. He denies any exacerbating or alleviating factors. He states he quit drinking alcohol 2 weeks ago. He does not take any home medications, except an occasional Tylenol. He does not have a PCP. He denies tobacco or illicit drug use. He states for the past 10 years on average he would drink 4 alcoholic beverages 3 times per week, plus he would drink on the weekends. He reports a family history of alcoholism.     Workup in the ER was significant for hgb 11.1, Plt 63, INR 1.41, Na 134, bilirubin 4.8, albumin 2.40, alk phos 173, AST 56, lipase 76. His alcohol level was negative. His EKG showed sinus tachycardia with prolonged QT interval. His urinalysis was abnormal and culture reflexed. His CT abd/pelvis showed \"Advanced cirrhosis with splenomegaly, large volume diffuse ascites and marked anasarca.\" He was admitted for further evaluation and treatment.         Review of Systems   Constitutional: Positive for weight gain.   Cardiovascular: Positive for leg swelling. Negative for chest pain.   Respiratory: Positive for shortness of breath. Negative for cough.    Gastrointestinal: Positive for bloating, abdominal pain, constipation, diarrhea and jaundice. Negative for nausea and vomiting.   All other systems reviewed and " are negative.        Personal History     Past Medical History:   Past Medical History:   Diagnosis Date   • Alcohol abuse        Surgical History:    History reviewed. No pertinent surgical history.    Family History: family history includes Alcohol abuse in his father and another family member; Diabetes in an other family member; Heart disease in an other family member; Hyperlipidemia in an other family member; Hypertension in an other family member. Family History was reviewed.     Social History:  reports that he has never smoked. He has never used smokeless tobacco. He reports current alcohol use. He reports that he does not use drugs.      Medications:  Prior to Admission medications    Not on File       Allergies:  No Known Allergies      Objective   Objective     Vital Signs  Temp:  [97.6 °F (36.4 °C)] 97.6 °F (36.4 °C)  Heart Rate:  [115-128] 115  Resp:  [16-22] 17  BP: (119-146)/(71-86) 119/83  SpO2:  [99 %-100 %] 100 %  on   ;   Device (Oxygen Therapy): room air  Body mass index is 39.56 kg/m².    Physical Exam  Vitals reviewed.   HENT:      Head: Normocephalic.   Eyes:      General: Scleral icterus present.      Extraocular Movements: Extraocular movements intact.      Pupils: Pupils are equal, round, and reactive to light.   Cardiovascular:      Rate and Rhythm: Regular rhythm. Tachycardia present.      Pulses:           Dorsalis pedis pulses are 2+ on the right side and 2+ on the left side.        Posterior tibial pulses are 2+ on the right side and 2+ on the left side.      Heart sounds: Normal heart sounds.   Pulmonary:      Effort: Tachypnea present.      Breath sounds: Examination of the right-lower field reveals rales. Examination of the left-lower field reveals rales. Rales present.   Abdominal:      General: Bowel sounds are decreased. There is distension.   Musculoskeletal:      Cervical back: Normal range of motion.      Right lower le+ Pitting Edema present.      Left lower le+  Pitting Edema present.   Skin:     General: Skin is dry.      Capillary Refill: Capillary refill takes less than 2 seconds.      Coloration: Skin is jaundiced.   Neurological:      Mental Status: He is alert and oriented to person, place, and time.   Psychiatric:         Mood and Affect: Mood normal.         Behavior: Behavior normal.         Results Review:  I have personally reviewed most recent lab results, microbiology results and radiology images and interpretations and agree with findings.    Results from last 7 days   Lab Units 06/01/21  1113   WBC 10*3/mm3 6.30   HEMOGLOBIN g/dL 11.1*   HEMATOCRIT % 32.0*   PLATELETS 10*3/mm3 63*   INR  1.41*     Results from last 7 days   Lab Units 06/01/21  1113   SODIUM mmol/L 134*   POTASSIUM mmol/L 4.5   CHLORIDE mmol/L 101   CO2 mmol/L 23.0   BUN mg/dL 22*   CREATININE mg/dL 1.23   GLUCOSE mg/dL 92   CALCIUM mg/dL 8.7   ALT (SGPT) U/L 36   AST (SGOT) U/L 56*   PROCALCITONIN ng/mL 0.11     Estimated Creatinine Clearance: 122.1 mL/min (by C-G formula based on SCr of 1.23 mg/dL).  Brief Urine Lab Results  (Last result in the past 365 days)      Color   Clarity   Blood   Leuk Est   Nitrite   Protein   CREAT   Urine HCG        06/01/21 1219 Red Cloudy Large (3+) Small (1+) Positive 30 mg/dL (1+)               Microbiology Results (last 10 days)     ** No results found for the last 240 hours. **          ECG/EMG Results (most recent)     Procedure Component Value Units Date/Time    ECG 12 Lead [[id number]] Collected: 06/01/21 1319     Updated: 06/01/21 1321     QT Interval 382 ms     Narrative:      HEART RATE= 117  bpm  RR Interval= 512  ms  LA Interval= 110  ms  P Horizontal Axis= -27  deg  P Front Axis= 69  deg  QRSD Interval= 105  ms  QT Interval= 382  ms  QRS Axis= 1  deg  T Wave Axis= -19  deg  - ABNORMAL ECG -  Sinus tachycardia  Prolonged QT interval  No previous ECG available for comparison  Electronically Signed By:   Date and Time of Study: 2021-06-01 13:19:53           CT Abdomen Pelvis Without Contrast    Result Date: 6/1/2021  Advanced cirrhosis with splenomegaly, large volume diffuse ascites and marked anasarca.  Electronically Signed By-Rocael Kinsey MD On:6/1/2021 1:23 PM This report was finalized on [id number] by  Rocael Kinsey MD.    XR Chest 1 View    Result Date: 6/1/2021  Low lung volumes without acute cardiopulmonary abnormality.  Electronically Signed By-Rocael Kinsey MD On:6/1/2021 12:02 PM This report was finalized on [id number] by  Rocael Kinsey MD.        Estimated Creatinine Clearance: 122.1 mL/min (by C-G formula based on SCr of 1.23 mg/dL).      Assessment/Plan   Assessment/Plan       Active Hospital Problems    Diagnosis  POA   • **Anasarca [R60.1]  Yes   • Cirrhosis with alcoholism (CMS/HCC) [K70.30]  Yes   • Hyperbilirubinemia [E80.6]  Yes   • Ascites due to alcoholic cirrhosis (CMS/HCC) [K70.31]  Yes   • Thrombocytopenia (CMS/HCC) [D69.6]  Yes   • Coagulopathy (CMS/HCC) [D68.9]  Yes   • Macrocytic anemia [D53.9]  Yes   • Prolonged QT interval [R94.31]  Yes   • Acute UTI (urinary tract infection) [N39.0]  Yes   • Alcohol abuse [F10.10]  Yes      Resolved Hospital Problems   No resolved problems to display.       Anasarca / Ascites due to alcoholic cirrhosis  -paracentesis ordered  -Lasix 40 mg IV q8h  -monitor I&Os and daily weight  -2 g Na diet  -obtain 2D echo    Cirrhosis with alcoholism / Alcohol abuse  -patient reports he quit drinking 2 weeks ago  -continued abstinence encouraged  -obtain liver US  -consult GI  -initiate CIWA protocol with PRN Ativan and monitor for withdrawals  -start daily multivitamin, thiamine, and folic acid    Hyperbilirubinemia  -likely secondary to alcoholic cirrhosis  -consult GI  -monitor and trend     Thrombocytopenia   -likely secondary to alcoholic cirrhosis  -no active bleeding noted  -monitor and trend Plts    Coagulopathy  -likely secondary to alcoholic cirrhosis  -no active bleeding noted  -monitor and  trend INR    Macrocytic anemia  -likely secondary to alcoholic cirrhosis  -check B12 and folate levels  -supplementation as above  -monitor and trend H&H    Prolonged QT interval  -etiology unclear, baseline unknown  -repeat EKG in am  -continuous cardiac monitoring     Acute UTI (urinary tract infection)  -urine culture pending  -ceftriaxone, follow culture           VTE Prophylaxis -   Mechanical Order History:      Ordered        06/01/21 1400  Place Sequential Compression Device  Once         06/01/21 1400  Maintain Sequential Compression Device  Continuous                 Pharmalogical Order History:     None          CODE STATUS:    Code Status and Medical Interventions:   Ordered at: 06/01/21 1400     Code Status:    CPR     Medical Interventions (Level of Support Prior to Arrest):    Full       This patient has been examined wearing appropriate Personal Protective Equipment. 06/01/21      I discussed the patient's findings and my recommendations with patient and family.      Signature:  Electronically signed by ROMEL Sharpe, 06/01/21, 3:02 PM EDT.  [facility] Hospitalist Team    I have reviewed the notes, assessments, and/or procedures performed by ROMEL Sharpe,, I concur with her/his documentation of Adam Alvares Jr..  Seen and examined agree with above, is a 35-year-old white male with a past medical history significant for alcohol abuse.  Presented to the ER with complaints of bilateral lower extremity edema and abdominal distention.  He states swelling started about 4 weeks ago and has gotten progressively worsened.  Reports associated shortness of breath but denies any chest pain cough congestion fever or chills.  Denies any exacerbating or alleviating factor.  On exam 35-year-old male in bed no acute distress, looks older than stated age, his neck is supple, lungs are clear to auscultation, heart regular rhythm normal S1-S2, the abdomen is distended, tender to palpation, diminished bowel  sounds, extremities he has 3+ edema, neurological exam no focal deficit, assessment and plan  1.  Ascites, anasarca, due to alcohol cirrhosis.  Paracentesis ordered.  Lasix started.  Monitor I's and O's electrolytes obtain 2D echo.  2. Cirrhosis secondary to alcohol abuse: Patient quit drinking 2 weeks ago.  Continue abstinence encouraged.  Liver ultrasound GI consulted.  Initiate his CIWA protocol.  3.  Hyperbilirubinemia likely secondary to alcohol cirrhosis.  GI consulted.  Electronically signed by Chi Reynolds MD, 06/02/21, 10:11 AM EDT.

## 2021-06-01 NOTE — ED NOTES
Pt is jaundiced c/o shortness of breath, abdomen is distended and firm with faint bowel sounds noted. Pt reports last BM was yesterday. 3+ dependant edema noted. Pt reports hx of alcohol abuse and states his last drink was 2 weeks ago. Pt states he has not seen an doctor in several years.     Rizwana Kellogg, RN  06/01/21 1131

## 2021-06-02 ENCOUNTER — INPATIENT HOSPITAL (AMBULATORY)
Dept: URBAN - METROPOLITAN AREA HOSPITAL 84 | Facility: HOSPITAL | Age: 36
End: 2021-06-02

## 2021-06-02 DIAGNOSIS — K70.31 ALCOHOLIC CIRRHOSIS OF LIVER WITH ASCITES: ICD-10-CM

## 2021-06-02 DIAGNOSIS — F10.188 ALCOHOL ABUSE WITH OTHER ALCOHOL-INDUCED DISORDER: ICD-10-CM

## 2021-06-02 DIAGNOSIS — R74.8 ABNORMAL LEVELS OF OTHER SERUM ENZYMES: ICD-10-CM

## 2021-06-02 DIAGNOSIS — D50.9 IRON DEFICIENCY ANEMIA, UNSPECIFIED: ICD-10-CM

## 2021-06-02 LAB
ALBUMIN FLD-MCNC: <0.4 G/DL
ALBUMIN SERPL-MCNC: 2.3 G/DL (ref 3.5–5.2)
ALP SERPL-CCNC: 144 U/L (ref 39–117)
ALT SERPL W P-5'-P-CCNC: 29 U/L (ref 1–41)
ANION GAP SERPL CALCULATED.3IONS-SCNC: 9 MMOL/L (ref 5–15)
APPEARANCE FLD: CLEAR
AST SERPL-CCNC: 48 U/L (ref 1–40)
BACTERIA SPEC AEROBE CULT: ABNORMAL
BASOPHILS # BLD AUTO: 0 10*3/MM3 (ref 0–0.2)
BASOPHILS NFR BLD AUTO: 0.8 % (ref 0–1.5)
BILIRUB CONJ SERPL-MCNC: 2.1 MG/DL (ref 0–0.3)
BILIRUB INDIRECT SERPL-MCNC: 2.3 MG/DL
BILIRUB SERPL-MCNC: 4.4 MG/DL (ref 0–1.2)
BUN SERPL-MCNC: 21 MG/DL (ref 6–20)
BUN/CREAT SERPL: 16.5 (ref 7–25)
CA-I SERPL ISE-MCNC: 1.14 MMOL/L (ref 1.2–1.3)
CALCIUM SPEC-SCNC: 7.8 MG/DL (ref 8.6–10.5)
CHLORIDE SERPL-SCNC: 103 MMOL/L (ref 98–107)
CO2 SERPL-SCNC: 21 MMOL/L (ref 22–29)
COLOR FLD: YELLOW
CREAT SERPL-MCNC: 1.27 MG/DL (ref 0.76–1.27)
DEPRECATED RDW RBC AUTO: 53.8 FL (ref 37–54)
EOSINOPHIL # BLD AUTO: 0.6 10*3/MM3 (ref 0–0.4)
EOSINOPHIL NFR BLD AUTO: 9.5 % (ref 0.3–6.2)
ERYTHROCYTE [DISTWIDTH] IN BLOOD BY AUTOMATED COUNT: 15.2 % (ref 12.3–15.4)
GFR SERPL CREATININE-BSD FRML MDRD: 65 ML/MIN/1.73
GLUCOSE SERPL-MCNC: 88 MG/DL (ref 65–99)
HCT VFR BLD AUTO: 28.1 % (ref 37.5–51)
HGB BLD-MCNC: 10 G/DL (ref 13–17.7)
INR PPP: 1.51 (ref 0.93–1.1)
LIPASE SERPL-CCNC: 73 U/L (ref 13–60)
LYMPHOCYTES # BLD AUTO: 0.8 10*3/MM3 (ref 0.7–3.1)
LYMPHOCYTES NFR BLD AUTO: 13.9 % (ref 19.6–45.3)
LYMPHOCYTES NFR FLD MANUAL: 60 %
MACROPHAGE FLUID: 4 %
MAGNESIUM SERPL-MCNC: 1.8 MG/DL (ref 1.6–2.6)
MCH RBC QN AUTO: 36.2 PG (ref 26.6–33)
MCHC RBC AUTO-ENTMCNC: 35.6 G/DL (ref 31.5–35.7)
MCV RBC AUTO: 101.6 FL (ref 79–97)
MESOTHL CELL NFR FLD MANUAL: 20 %
METHOD: NORMAL
MONOCYTES # BLD AUTO: 0.9 10*3/MM3 (ref 0.1–0.9)
MONOCYTES NFR BLD AUTO: 16 % (ref 5–12)
MONOCYTES NFR FLD: 12 %
NEUTROPHILS NFR BLD AUTO: 3.5 10*3/MM3 (ref 1.7–7)
NEUTROPHILS NFR BLD AUTO: 59.8 % (ref 42.7–76)
NEUTROPHILS NFR FLD MANUAL: 4 %
NRBC BLD AUTO-RTO: 0.1 /100 WBC (ref 0–0.2)
NUC CELL # FLD: 62 /MM3
PHOSPHATE SERPL-MCNC: 3.5 MG/DL (ref 2.5–4.5)
PLATELET # BLD AUTO: 60 10*3/MM3 (ref 140–450)
PMV BLD AUTO: 7.1 FL (ref 6–12)
POTASSIUM SERPL-SCNC: 4.2 MMOL/L (ref 3.5–5.2)
PREALB SERPL-MCNC: 3.5 MG/DL (ref 20–40)
PROT FLD-MCNC: <1 G/DL
PROT SERPL-MCNC: 6.8 G/DL (ref 6–8.5)
PROTHROMBIN TIME: 16.3 SECONDS (ref 9.6–11.7)
RBC # BLD AUTO: 2.77 10*6/MM3 (ref 4.14–5.8)
SODIUM SERPL-SCNC: 133 MMOL/L (ref 136–145)
WBC # BLD AUTO: 5.8 10*3/MM3 (ref 3.4–10.8)

## 2021-06-02 PROCEDURE — 25010000002 CEFTRIAXONE PER 250 MG: Performed by: NURSE PRACTITIONER

## 2021-06-02 PROCEDURE — P9047 ALBUMIN (HUMAN), 25%, 50ML: HCPCS | Performed by: NURSE PRACTITIONER

## 2021-06-02 PROCEDURE — 76942 ECHO GUIDE FOR BIOPSY: CPT

## 2021-06-02 PROCEDURE — 82330 ASSAY OF CALCIUM: CPT | Performed by: NURSE PRACTITIONER

## 2021-06-02 PROCEDURE — 99232 SBSQ HOSP IP/OBS MODERATE 35: CPT | Performed by: INTERNAL MEDICINE

## 2021-06-02 PROCEDURE — 87205 SMEAR GRAM STAIN: CPT | Performed by: NURSE PRACTITIONER

## 2021-06-02 PROCEDURE — 93005 ELECTROCARDIOGRAM TRACING: CPT | Performed by: NURSE PRACTITIONER

## 2021-06-02 PROCEDURE — 25010000002 ALBUMIN HUMAN 25% PER 50 ML: Performed by: NURSE PRACTITIONER

## 2021-06-02 PROCEDURE — 85610 PROTHROMBIN TIME: CPT | Performed by: NURSE PRACTITIONER

## 2021-06-02 PROCEDURE — 93010 ELECTROCARDIOGRAM REPORT: CPT | Performed by: INTERNAL MEDICINE

## 2021-06-02 PROCEDURE — 25010000002 FUROSEMIDE PER 20 MG: Performed by: NURSE PRACTITIONER

## 2021-06-02 PROCEDURE — 85025 COMPLETE CBC W/AUTO DIFF WBC: CPT | Performed by: NURSE PRACTITIONER

## 2021-06-02 PROCEDURE — 80048 BASIC METABOLIC PNL TOTAL CA: CPT | Performed by: NURSE PRACTITIONER

## 2021-06-02 PROCEDURE — 82042 OTHER SOURCE ALBUMIN QUAN EA: CPT | Performed by: NURSE PRACTITIONER

## 2021-06-02 PROCEDURE — 87070 CULTURE OTHR SPECIMN AEROBIC: CPT | Performed by: NURSE PRACTITIONER

## 2021-06-02 PROCEDURE — 84134 ASSAY OF PREALBUMIN: CPT | Performed by: NURSE PRACTITIONER

## 2021-06-02 PROCEDURE — 99222 1ST HOSP IP/OBS MODERATE 55: CPT | Performed by: NURSE PRACTITIONER

## 2021-06-02 PROCEDURE — 89051 BODY FLUID CELL COUNT: CPT | Performed by: NURSE PRACTITIONER

## 2021-06-02 PROCEDURE — 83690 ASSAY OF LIPASE: CPT | Performed by: NURSE PRACTITIONER

## 2021-06-02 PROCEDURE — 83735 ASSAY OF MAGNESIUM: CPT | Performed by: NURSE PRACTITIONER

## 2021-06-02 PROCEDURE — 80076 HEPATIC FUNCTION PANEL: CPT | Performed by: NURSE PRACTITIONER

## 2021-06-02 PROCEDURE — 49083 ABD PARACENTESIS W/IMAGING: CPT | Performed by: INTERNAL MEDICINE

## 2021-06-02 PROCEDURE — 84157 ASSAY OF PROTEIN OTHER: CPT | Performed by: NURSE PRACTITIONER

## 2021-06-02 PROCEDURE — 0W9G3ZZ DRAINAGE OF PERITONEAL CAVITY, PERCUTANEOUS APPROACH: ICD-10-PCS | Performed by: HOSPITALIST

## 2021-06-02 PROCEDURE — 84100 ASSAY OF PHOSPHORUS: CPT | Performed by: NURSE PRACTITIONER

## 2021-06-02 PROCEDURE — 25010000002 CALCIUM GLUCONATE 2-0.675 GM/100ML-% SOLUTION: Performed by: INTERNAL MEDICINE

## 2021-06-02 RX ORDER — SPIRONOLACTONE 100 MG/1
100 TABLET, FILM COATED ORAL DAILY
Status: DISCONTINUED | OUTPATIENT
Start: 2021-06-02 | End: 2021-06-04 | Stop reason: HOSPADM

## 2021-06-02 RX ORDER — ZINC SULFATE 50(220)MG
220 CAPSULE ORAL DAILY
Qty: 30 CAPSULE | Refills: 0 | Status: SHIPPED | OUTPATIENT
Start: 2021-06-03

## 2021-06-02 RX ORDER — FOLIC ACID 1 MG/1
1 TABLET ORAL DAILY
Qty: 30 TABLET | Refills: 6 | Status: SHIPPED | OUTPATIENT
Start: 2021-06-03

## 2021-06-02 RX ORDER — FUROSEMIDE 40 MG/1
40 TABLET ORAL DAILY
Qty: 30 TABLET | Refills: 1 | Status: ON HOLD | OUTPATIENT
Start: 2021-06-03 | End: 2021-10-01 | Stop reason: SDUPTHER

## 2021-06-02 RX ORDER — CALCIUM GLUCONATE 20 MG/ML
2 INJECTION, SOLUTION INTRAVENOUS ONCE
Status: COMPLETED | OUTPATIENT
Start: 2021-06-02 | End: 2021-06-02

## 2021-06-02 RX ORDER — SPIRONOLACTONE 100 MG/1
100 TABLET, FILM COATED ORAL DAILY
Qty: 30 TABLET | Refills: 1 | Status: SHIPPED | OUTPATIENT
Start: 2021-06-03 | End: 2021-10-01 | Stop reason: HOSPADM

## 2021-06-02 RX ORDER — FUROSEMIDE 40 MG/1
40 TABLET ORAL DAILY
Status: DISCONTINUED | OUTPATIENT
Start: 2021-06-03 | End: 2021-06-03

## 2021-06-02 RX ORDER — LACTULOSE 10 G/15ML
10 SOLUTION ORAL DAILY
Qty: 946 ML | Refills: 3 | Status: ON HOLD | OUTPATIENT
Start: 2021-06-03 | End: 2021-10-01 | Stop reason: SDUPTHER

## 2021-06-02 RX ORDER — ZINC SULFATE 50(220)MG
220 CAPSULE ORAL DAILY
Status: DISCONTINUED | OUTPATIENT
Start: 2021-06-02 | End: 2021-06-04 | Stop reason: HOSPADM

## 2021-06-02 RX ORDER — MIDODRINE HYDROCHLORIDE 5 MG/1
5 TABLET ORAL ONCE
Status: COMPLETED | OUTPATIENT
Start: 2021-06-02 | End: 2021-06-02

## 2021-06-02 RX ADMIN — FOLIC ACID 1 MG: 1 TABLET ORAL at 08:39

## 2021-06-02 RX ADMIN — THERA TABS 1 TABLET: TAB at 08:40

## 2021-06-02 RX ADMIN — MIDODRINE HYDROCHLORIDE 5 MG: 5 TABLET ORAL at 16:50

## 2021-06-02 RX ADMIN — Medication 10 ML: at 08:42

## 2021-06-02 RX ADMIN — FUROSEMIDE 40 MG: 10 INJECTION, SOLUTION INTRAMUSCULAR; INTRAVENOUS at 08:40

## 2021-06-02 RX ADMIN — ALBUMIN HUMAN 112.5 G: 0.25 SOLUTION INTRAVENOUS at 14:35

## 2021-06-02 RX ADMIN — LACTULOSE 10 G: 20 SOLUTION ORAL at 08:43

## 2021-06-02 RX ADMIN — Medication 100 MG: at 08:39

## 2021-06-02 RX ADMIN — FUROSEMIDE 40 MG: 10 INJECTION, SOLUTION INTRAMUSCULAR; INTRAVENOUS at 00:52

## 2021-06-02 RX ADMIN — CALCIUM GLUCONATE 2 G: 20 INJECTION, SOLUTION INTRAVENOUS at 11:07

## 2021-06-02 RX ADMIN — SPIRONOLACTONE 100 MG: 100 TABLET ORAL at 11:06

## 2021-06-02 RX ADMIN — Medication 10 ML: at 20:12

## 2021-06-02 RX ADMIN — CEFTRIAXONE SODIUM 1 G: 1 INJECTION, POWDER, FOR SOLUTION INTRAMUSCULAR; INTRAVENOUS at 20:12

## 2021-06-02 RX ADMIN — ZINC SULFATE 220 MG (50 MG) CAPSULE 220 MG: CAPSULE at 11:06

## 2021-06-02 RX ADMIN — Medication 10 ML: at 00:52

## 2021-06-02 NOTE — NURSING NOTE
PARACENTESIS    Time Arrived in Department: 1300      Consent: yes  Allergies have been Reviewed: yes      ID Band Verified: yes    Method: Wheelchair    Lab Results: Checked and MD Notified     Physician: Dr. Bowles      Nurse: Georgia Small RN    IR Care Plan: Person Educated - Patient, Current Knowledge - Understands, Learning Barrier - None, Readiness to Learn - Acceptance, Teaching Topic - Procedure and Evaluation of Teaching - Verbalized Understanding      Neurological: Within Normal Limits    Skin: Flesh, Warm and Dry    Respiratory Status: Respirations even and enlabored    Room In: old endo      Procedure: Paracentesis    Time Out Completed: yes    Time Out: 1355    Prep Time: 1356    Prep Site 1: Right Lateral Abdomen      Prep: Chlorhexidine and Sterile drape    Procedure Position: Right Side    Guidance: Ultrasound    Fluid Quality: Clear and Yellow    Procedure Note: Paracentesis began at 1358        Intra Time 1:1415    Intra Assess 1:   LOC: Alert  Pain:  No Issues  Skin: Flesh, Warm and Dry  Respiratory Status:  Even and Unlabored  Intra Procedure Note 1:         Intra Time 2: 1445    Intra Assess 2:   LOC: Alert  Pain: No Issues  Skin: Flesh, Warm and Dry  Respiratory Status: Even and Unlabored  Intra Procedure Note 2:         Intra Time 3: 1515    Intra Assess 3:   LOC: Alert  Pain: No Issues  Skin: Flesh, Warm and Dry  Respiratory Status: Even and Unlabored  Intra Procedure Note 3:            Post-Procedure Position: Supine and HOB Elevated    Dressing Site 1: 2 x 2, 4 x 4 and Tegaderm    Post Procedure Status:  Alert and Oriented, returned to baseline, Dressing Dry/ Intact, Stable Condition and Dressing properly labeled    Specimen To Lab: yes    Total Fluid Removed: 15.5 liters    Post Procedure Note:  Patient tolerated well, but blood pressure started dropping down, so stopped drainage early.  Ascites fluid was still coming out heavily when stopped.  1635, spoke to Dr. Bowles regarding blood pressure  being low.  Order put in to give a one time dose of midodrine. Gave patient a total of 112.5 mg of Albumin before he returned to the floor per order.        Report Given To: 3A RN    Admit/Transfer To: Return to room 300    Transfer Time: 1730    Discharge Time: return to floor    Method: Wheelchair    O2 on Transfer: no    Accompanied By:  Nurse    Clothes Changed:  Self    Discharged Location:  Admit    Discharge Teaching:  Inpatient, Care of Dressing and PAtient

## 2021-06-02 NOTE — PLAN OF CARE
Goal Outcome Evaluation:        Outcome Summary: Patient currently off the floor for a paracentesis. Patient has discharge orders. Patient to be discharged today. Will continue to observe.

## 2021-06-02 NOTE — PROCEDURES
Procedure:Ultrasound guided Paracentesis    Consent: Informed    Pre op diagnosis: alcoholic Cirrhosis, Massive ascites    Post op diagnosis: Cirrhosis, Massive ascites    Anesthesia Provider: .Francisco Bowles MD    Anesthesia type: Local infiltration with 1% xylocaine    Surgeon: .Francisco Bowles MD    Assistant: none    Procedure summary:    Allergies , labs and medications were reviewed. Patient was made to lay supine and the area of interest was imaged with ultrasound and fluid verified and marked.  Area of interest was cleaned and draped in a sterile fashion. subsequently local infiltration with xylocaine. Trocar and canula were advanced. Upon verification of the fluid, trocar was steadied and canula advanced further. Trocar was drawn out. Canula was connected to extension tubing and to the vacuum bottle. Subsequently the canula was removed after completion of the procedure.     Findings: Clear straw yellow fluid was obtained. See nursing documentation for volume drained.    Lab Specimen: *40 ml sent   Complication: Patient blood pressure was low after completion of paracentesis.  Refer to the detailed.  Patient will be complete his albumin infusion and 1 dose of midodrine was given by mouth/ordered by mouth 5 mg.  Further management per Dr. Wallace primary attending.      EBL: 0 ml    Post op condition: Stable. Albumin was given by protocol if needed.    Post op plan: Discharge back to the referring physician.

## 2021-06-02 NOTE — PLAN OF CARE
Goal Outcome Evaluation:         Patient resting abed, no distress noted. Patient only complaint is from the weight of his abdomen, he hopes that something can be done to reduce it. Will continue to monitor

## 2021-06-02 NOTE — CONSULTS
"GI CONSULT  NOTE:    Referring Provider:  Eneida Daniel NP    Chief complaint: Cirrhosis, ascites    Subjective .     History of present illness: Adam Alvares Jr. is a 35 y.o. male with history of EtOH abuse who presents with complaints of abdominal swelling and bilateral lower extremity edema.  The patient reports that he began noticing swelling of his abdomen and lower extremities approximately 3 weeks ago.  States that it continued to progressively worsen until he decided to present to the ER.  He does report associated shortness of breath.  Denies any abdominal pain.  He was given IV Lasix yesterday and reports that abdominal distention has improved somewhat.  States that he has been urinating frequently.  He denies any nausea/vomiting, heartburn, or dysphagia.  No bright red blood per rectum or melena.  Denies recent confusion.  The patient does report a history of EtOH abuse.  Reports that he drinks at least 4 days weekly.  Denies any history of illicit drug use.      Endo History:  No previous endoscopy.    Past Medical History:  Past Medical History:   Diagnosis Date   • Alcohol abuse        Past Surgical History:  History reviewed. No pertinent surgical history.    Social History:  Social History     Tobacco Use   • Smoking status: Never Smoker   • Smokeless tobacco: Never Used   Vaping Use   • Vaping Use: Never used   Substance Use Topics   • Alcohol use: Yes     Comment: 06/01/21: last drink 2 weeks ago; \"use to drink 4 shots of liquor 3 times per week, and then some on the weekends\"   • Drug use: Never       Family History:  Family History   Problem Relation Age of Onset   • Alcohol abuse Father    • Alcohol abuse Other    • Diabetes Other    • Heart disease Other    • Hyperlipidemia Other    • Hypertension Other        Medications:  No medications prior to admission.       Scheduled Meds:albumin human, 37.5 g, Intravenous, Once   Or  albumin human, 50 g, Intravenous, Once   Or  albumin human, 62.5 g, " "Intravenous, Once   Or  albumin human, 75 g, Intravenous, Once   Or  albumin human, 87.5 g, Intravenous, Once   Or  albumin human, 100 g, Intravenous, Once   Or  albumin human, 112.5 g, Intravenous, Once  cefTRIAXone, 1 g, Intravenous, Q24H  folic acid, 1 mg, Oral, Daily  furosemide, 40 mg, Intravenous, Q8H  lactulose, 10 g, Oral, Daily  multivitamin, 1 tablet, Oral, Daily  sodium chloride, 10 mL, Intravenous, Q12H  thiamine, 100 mg, Oral, Daily      Continuous Infusions:   PRN Meds:.•  acetaminophen **OR** acetaminophen **OR** acetaminophen  •  aluminum-magnesium hydroxide-simethicone  •  ketorolac  •  LORazepam **OR** LORazepam **OR** LORazepam **OR** LORazepam **OR** LORazepam **OR** LORazepam  •  magnesium sulfate **OR** magnesium sulfate in D5W 1g/100mL (PREMIX)  •  melatonin  •  nitroglycerin  •  ondansetron **OR** ondansetron  •  potassium chloride **OR** potassium chloride **OR** potassium chloride  •  sodium chloride    ALLERGIES:  Patient has no known allergies.    ROS:  The following systems were reviewed;   Constitution:  No fevers, chills, no unintentional weight loss  Skin: no rash, jaundice  Eyes:  No blurry vision, no eye pain  HENT:  No change in hearing or smell  Resp:  No cough, dyspnea  CV:  No chest pain or palpitations  :  No dysuria, hematuria  Musculoskeletal:  No leg cramps or arthralgias  Neuro:  No tremor, no numbness  Psych:  No depression or confusion    Objective     Vital Signs:   Vitals:    06/01/21 1510 06/01/21 1551 06/02/21 0019 06/02/21 0300   BP: 136/81 128/76 125/76 113/65   BP Location: Right arm  Right arm Right arm   Patient Position: Lying  Lying Lying   Pulse: 120 117 116 119   Resp:   17 20   Temp: 97.7 °F (36.5 °C)  97.7 °F (36.5 °C) 98.1 °F (36.7 °C)   TempSrc: Oral  Oral Oral   SpO2:   99% 95%   Weight:  136 kg (299 lb)     Height:  185.4 cm (73\")  185.4 cm (73\")       Physical Exam:       General Appearance:    Awake and alert, in no acute distress   Head:    " Normocephalic, without obvious abnormality, atraumatic   Throat:   No oral lesions, no thrush, oral mucosa moist   Lungs:     Respirations regular, even and unlabored   Chest Wall:    No abnormalities observed   Abdomen:     Soft, non-tender, no rebound or guarding, marked distention   Rectal:     Deferred   Extremities:   Moves all extremities, 3+ pitting edema to the bilateral lower extremities, no cyanosis   Pulses:   Pulses palpable and equal bilaterally   Skin:   No rash, jaundice, normal palpation   Lymph nodes:   No cervical, supraclavicular or submandibular palpable adenopathy   Neurologic:   Cranial nerves 2 - 12 grossly intact, no asterixis       Results Review:   I reviewed the patient's labs and imaging.  CBC    Results from last 7 days   Lab Units 06/02/21  0534 06/01/21  1113   WBC 10*3/mm3 5.80 6.30   HEMOGLOBIN g/dL 10.0* 11.1*   PLATELETS 10*3/mm3 60* 63*     CMP   Results from last 7 days   Lab Units 06/02/21  0534 06/01/21  1519 06/01/21  1113   SODIUM mmol/L 133*  --  134*   POTASSIUM mmol/L 4.2  --  4.5   CHLORIDE mmol/L 103  --  101   CO2 mmol/L 21.0*  --  23.0   BUN mg/dL 21*  --  22*   CREATININE mg/dL 1.27  --  1.23   GLUCOSE mg/dL 88  --  92   ALBUMIN g/dL 2.30*  --  2.40*   BILIRUBIN mg/dL 4.4*  --  4.8*   ALK PHOS U/L 144*  --  173*   AST (SGOT) U/L 48*  --  56*   ALT (SGPT) U/L 29  --  36   MAGNESIUM mg/dL 1.8  --  1.9   PHOSPHORUS mg/dL 3.5  --   --    LIPASE U/L 73*  --  72*   AMMONIA umol/L  --  25  --      Cr Clearance Estimated Creatinine Clearance: 118.2 mL/min (by C-G formula based on SCr of 1.27 mg/dL).  Coag   Results from last 7 days   Lab Units 06/02/21  0534 06/01/21  1113   INR  1.51* 1.41*   APTT seconds  --  33.6*     HbA1C No results found for: HGBA1C  Blood Glucose No results found for: POCGLU  Infection   Results from last 7 days   Lab Units 06/01/21  1436 06/01/21  1219 06/01/21  1113   BODYFLDCX  No growth at less than 24 hours  --   --    URINECX   --  <10,000 CFU/mL  Gram Negative Bacilli*  --    PROCALCITONIN ng/mL  --   --  0.11     UA    Results from last 7 days   Lab Units 06/01/21  1219   NITRITE UA  Positive*   WBC UA /HPF 3-5*   BACTERIA UA /HPF Trace*   SQUAM EPITHEL UA /HPF None Seen   URINECX  <10,000 CFU/mL Gram Negative Bacilli*     Radiology(recent) CT Abdomen Pelvis Without Contrast    Result Date: 6/1/2021  Advanced cirrhosis with splenomegaly, large volume diffuse ascites and marked anasarca.  Electronically Signed By-Rocael Kinsey MD On:6/1/2021 1:23 PM This report was finalized on [id number] by  Rocael Kinsey MD.    US Liver    Result Date: 6/1/2021   1. The liver appears cirrhotic. 2. Large amount of ascites. 3. Gallbladder wall thickening could be due to cirrhosis and/or ascites.  Electronically Signed By-Liz Fox MD On:6/1/2021 7:53 PM This report was finalized on [id number] by  Liz Fox MD.    XR Chest 1 View    Result Date: 6/1/2021  Low lung volumes without acute cardiopulmonary abnormality.  Electronically Signed By-Rocael Kinsey MD On:6/1/2021 12:02 PM This report was finalized on [id number] by  Rocael Kinsey MD.         ASSESSMENT:  -New onset ascites  -EtOH abuse  -EtOH cirrhosis complicated by ascites  -Elevated LFTs -likely due to EtOH abuse and cirrhosis  -Macrocytic anemia  -Thrombocytopenia -likely due to cirrhosis  -Abdominal distention  -Dyspnea -likely secondary to large amount of ascites  -Anasarca    PLAN:  Patient presents with complaints of abdominal distention and lower extremity edema.  CT abdomen/pelvis on admission does show changes of cirrhosis with splenomegaly along with a large volume of ascites.  RUQ US shows cirrhosis and a large amount of ascites.  We will plan paracentesis today.  Replace albumin as needed.  Send fluid studies to rule out SBP.  Continue empiric Rocephin for now.  We will start Lasix 40 mg daily along with spironolactone 100 mg daily.  Monitor renal function closely and adjust diuretics  as needed.  Low-sodium diet.  We will consult dietitian for education.  Recommend complete EtOH cessation.  Monitor for DTs.  Continue folic acid, multivitamin, and thiamine.  We will add zinc.  Nutrition encouraged.  MELD-Na 23. Maddrey's DF 26.  No need for prednisolone at this time.  Patient will need outpatient EGD to screen for esophageal varices.  Likely okay for discharge home later today following paracentesis.  He will need repeat CMP and PT/INR in 1 week.  He will need to follow-up in our office in 2 to 4 weeks.      I discussed the patients findings and my recommendations with the patient.  I will discuss the case with Dr. Skinner and change the plan accordingly.    We appreciate the referral.    ROMEL Lakhani  06/02/21  10:05 EDT

## 2021-06-02 NOTE — CASE MANAGEMENT/SOCIAL WORK
Discharge Planning Assessment   [facility]     Patient Name: Adam Alvares Jr.  MRN: [id number]  Today's Date: 6/2/2021    Admit Date: 6/1/2021    Discharge Needs Assessment     Row Name 06/02/21 1056       Living Environment    Current Living Arrangements  home/apartment/condo    Primary Care Provided by  self    Family Caregiver if Needed  parent(s)    Able to Return to Prior Arrangements  yes       Resource/Environmental Concerns    Resource/Environmental Concerns  none    Transportation Concerns  car, none       Transition Planning    Patient/Family Anticipates Transition to  home    Patient/Family Anticipated Services at Transition         Discharge Needs Assessment    Readmission Within the Last 30 Days  no previous admission in last 30 days    Concerns to be Addressed  substance/tobacco abuse/use    Anticipated Changes Related to Illness  none    Equipment Needed After Discharge  none    Current Discharge Risk  substance use/abuse        Discharge Plan     Row Name 06/02/21 1057       Plan    Plan  Anticipate routine home. RATNA consult for ETOH/uninsured.    Patient/Family in Agreement with Plan  yes    Plan Comments  Assessment completed via chart review, no CM d/c needs identified. Notified Nick SEGUNDO of ETOH abuse and uninsured. Enrolled in B. DC barriers: GI consult, paracentesis, IV Lasix. Potential d/c home today.        Demographic Summary     Row Name 06/02/21 1056       General Information    Admission Type  inpatient    Arrived From  emergency department    Referral Source  admission list    Reason for Consult  discharge planning;insurance concerns;substance use concerns    Preferred Language  English        Functional Status     Row Name 06/02/21 1056       Functional Status    Usual Activity Tolerance  good    Current Activity Tolerance  good       Functional Status, IADL    Medications  independent    Meal Preparation  independent    Housekeeping  independent    Laundry  independent     Shopping  independent        Phone communication or documentation only - no physical contact with patient or family.        Teresa Wren RN

## 2021-06-02 NOTE — CASE MANAGEMENT/SOCIAL WORK
Continued Stay Note  [facility]     Patient Name: Adam Alvares Jr.  MRN: [id number]  Today's Date: 6/2/2021    Admit Date: 6/1/2021    Discharge Plan     Row Name 06/02/21 1250       Plan    Plan Comments  Discharge orders noted    Final Discharge Disposition Code  01 - home or self-care    Final Note  Home         Teresa Wren RN

## 2021-06-02 NOTE — CONSULTS
"Nutrition Services    Patient Name: Adam Alvares Jr.  YOB: 1985  MRN: [id number]  Admission date: 6/1/2021    Comments:     Continue current diet     Will follow up for patient interview     PPE Documentation        PPE Worn By Provider Patient not in room    PPE Worn By Patient  N/A     CLINICAL NUTRITION ASSESSMENT      Reason for Assessment 6/2: MSA      H&P:  Per H&P \"Mr. Alvares is a 35 y.o. male with a history of alcohol abuse who presents to [facility] ED on 06/01/2021 complaining of swelling of his legs and abdomen. He states the swelling started approximately 4 weeks ago and has gotten progressively worse. He reports associated shortness of breath...\"    Past Medical History:   Diagnosis Date   • Alcohol abuse        History reviewed. No pertinent surgical history.     Current Problems:   Anasarca / Ascites due to alcoholic cirrhosis     Cirrhosis with alcoholism / Alcohol abuse  -patient reports he quit drinking 2 weeks ago  -GI following      Hyperbilirubinemia  -likely secondary to alcoholic cirrhosis  -GI following      Thrombocytopenia   -likely secondary to alcoholic cirrhosis     Coagulopathy  -likely secondary to alcoholic cirrhosis     Macrocytic anemia  -likely secondary to alcoholic cirrhosis     Prolonged QT interval     Acute UTI (urinary tract infection)          Nutrition/Diet History         Narrative     6/2: Unable to see patient today after attempting 2x. Per case management note, patient drinks ~4 whiskeys per day and is severely edematous from cirrhosis. No prior weight hx obtained to gauge weight changes. Will follow up for patient interview.    Functional Status Independent with ADLs     Food Allergies NKFA      Factors Affecting   Nutritional Intake Anasarca      Anthropometrics        Current Height, Weight Height: 185.4 cm (73\")  Weight: 136 kg (299 lb) (06/01/21 1551)        Admit Height, Weight -    Flowsheet Rows      First Filed Value   Admission Height  " "185.4 cm (73\") Documented at 06/01/2021 1017   Admission Weight  136 kg (299 lb 13.2 oz) Documented at 06/01/2021 1016             Ideal Body Weight (IBW) 184 lb    % Ideal Body Weight 163%       Usual Body Weight UTD   % Usual Body Weight UTD   Wt Change Observation -   Weight Hx    Wt Readings from Last 30 Encounters:   06/01/21 1551 136 kg (299 lb)   06/01/21 1016 136 kg (299 lb 13.2 oz)        BMI kg/m2 Body mass index is 39.45 kg/m².       Labs/Medications         Pertinent Labs Hyponatremia  Hypocalcemia   Hyperbilirubinemia  Elevated alk phos, AST, BUN    Results from last 7 days   Lab Units 06/02/21  0534 06/01/21  1113   SODIUM mmol/L 133* 134*   POTASSIUM mmol/L 4.2 4.5   CHLORIDE mmol/L 103 101   CO2 mmol/L 21.0* 23.0   BUN mg/dL 21* 22*   CREATININE mg/dL 1.27 1.23   CALCIUM mg/dL 7.8* 8.7   BILIRUBIN mg/dL 4.4* 4.8*   ALK PHOS U/L 144* 173*   ALT (SGPT) U/L 29 36   AST (SGOT) U/L 48* 56*   GLUCOSE mg/dL 88 92     Results from last 7 days   Lab Units 06/02/21  0534 06/01/21  1113   MAGNESIUM mg/dL 1.8 1.9   PHOSPHORUS mg/dL 3.5  --    HEMOGLOBIN g/dL 10.0* 11.1*   HEMATOCRIT % 28.1* 32.0*     No results found for: COVID19  No results found for: HGBA1C      Pertinent Medications Albumin human, calcium gluconate, rocephin, folvite, lasix, chronulac, theragren, sodium chloride, aldactone, vitamin B1, zincate      Physical Findings        Overall Physical   Appearance, MSA 6/2: Unable to see patient this date    --  Edema  3+ (moderate) BLE   4+ (severe) abdomen      Gastrointestinal + BM 6/1      Tubes No feeding tubes present      Oral/Mouth Cavity No chewing/swallowing difficulty      Skin Intact      --  Current Nutrition Orders & Evaluation of Intake       Oral Nutrition     Current PO Diet Diet Cardiac; 2gm Na+   Supplement -   PO Evaluation     % PO Intake 88% intake x 2 meals documented    --  Clinical Course    Nutrition Course Details 6/1 2gm Na+ (ongoing)      Nutritional Risk Screening        " NRS-2002 Score   -       Nutrition Diagnosis         Nutrition Dx Problem 1 No nutrition dx at this time, will continue to monitor/follow up to determine if dx arises      Nutrition Dx Problem 2 -       Intervention Goal         Intervention Goal(s) Continue meal intakes >75%     Nutrition Intervention        RD/Tech Action Follow up for patient interview      Nutrition Prescription          Diet Prescription 2gm Na+    Supplement Prescription -   --  Monitor/Evaluation        Monitor PO intake, Pertinent labs, Weight, Skin status, GI status     Electronically signed by:  Alea Heck  06/02/21 10:14 EDT

## 2021-06-02 NOTE — CASE MANAGEMENT/SOCIAL WORK
Social Work Assessment  [facility]     Patient Name: Adam Alvares Jr.  MRN: [id number]  Today's Date: 6/2/2021    Admit Date: 6/1/2021      Discharge Plan     Row Name 06/02/21 1245       Plan    Plan  Anticipate routine home with parents. Meds to Bed enrolled. [facility] PCP Apt: 6/10th @ 1:30pm, added to AVS.    Patient/Family in Agreement with Plan  yes    Plan Comments  Met with pt at bedside and parents were present, pt agreeable to them remaining in room. Screened for ETOH & uninsured status. Pt reports he currently works full-time for [employer] & that insurance is offered through employer. Over-income for Medicaid. Plans to enroll in insurance through employer when available. Confirmed no PCP. Scheduled [facility] Appointment for June 10th @ 1:30pm. Information added to AVS. No trouble affording medications, CM enrolled in M2[facility] for bedside delivery at d/c. Alternate pharmacy is [place], [place]. See ETOH assessment for details on use.     Substance Abuse     Row Name 06/02/21 1244       Substance Use    Substance Use Status  current alcohol use    Last Alcohol Use  05/19/21    Environment Typically Uses Alcohol  alone    Reported Characteristics of Alcohol Use  unsuccessful efforts to stop    Readiness to Change Alcohol Use  contemplation    Attempts to Quit Alcohol  quit on own    Longest Period of Alcohol Sobriety  N/A    Previous Substance Use Treatment  none    Substance Use Comment  Pt reports ETOH use that includes 3-5 drinks depending whether during the week vs. weekend. Pt reports drink of choice is Whiskey. Pt states he drinks more on the weekend. Reports that he has stopped on his own, but hasn’t noted signs of withdrawal, but hasn’t been feeling well - therefore believes it’s all related. Last reported drink 2 weeks ago. Planning to move in with parents for additional support. Declined interest in IOP, o/p treatment, etc. Discussed importance of removing all alcohol from the home  & encouraged attendance to AA meetings. Declined resources at this time.       AUDIT-C (Alcohol Use Disorders ID Test)    Alcohol Use In Past Year  4-->four or more times a week    Alcohol Amount Per Day In Past Year  1-->three or four    More Than 6 Drinks On One Occasion  4-->daily or almost daily    Total AUDIT-C Score  9     Met with patient in room wearing PPE: mask, face shield/goggles, gloves, gown.      Maintained distance greater than six feet and spent less than 15 minutes in the room.      COLE Mars    Phone: [phone]  Cell: [phone]  Fax: [phone]  [email or web address]

## 2021-06-02 NOTE — PROGRESS NOTES
"      [facility] Medicine Services Daily Progress Note      Hospitalist Team  LOS 1 days      Patient Care Team:  Provider, No Known as PCP - General    Patient Location: 300/1      Subjective   Subjective     Chief Complaint / Subjective  Chief Complaint   Patient presents with   • Edema     pt states he started swelling getting worse over last 2 weeks, more soa,  pt reports weigh gain of 75lb in last 2 weeks     Brief Synopsis of Hospital Course/HPI  35 y.o. male with a history of alcohol abuse who presents to [facility] ED on 06/01/2021 complaining of swelling of his legs and abdomen. He states the swelling started approximately 4 weeks ago and has gotten progressively worse. He reports associated shortness of breath, but denies chest pain, cough, congestion, fever, or chills. He denies any exacerbating or alleviating factors. He states he quit drinking alcohol 2 weeks ago. He does not take any home medications, except an occasional Tylenol. He does not have a PCP. He denies tobacco or illicit drug use. He states for the past 10 years on average he would drink 4 alcoholic beverages 3 times per week, plus he would drink on the weekends. He reports a family history of alcoholism.      Workup in the ER was significant for hgb 11.1, Plt 63, INR 1.41, Na 134, bilirubin 4.8, albumin 2.40, alk phos 173, AST 56, lipase 76. His alcohol level was negative. His EKG showed sinus tachycardia with prolonged QT interval. His urinalysis was abnormal and culture reflexed. His CT abd/pelvis showed \"Advanced cirrhosis with splenomegaly, large volume diffuse ascites and marked anasarca.\" He was admitted for further evaluation and treatment.     Date:  6/2/21: Patient seen and examined in bed no acute distress, complaining of abdominal distention edema, to undergo paracentesis today.    Review of Systems   Constitutional: Positive for malaise/fatigue and weight gain.   HENT: Negative.    Eyes: Negative.  " "  Cardiovascular: Positive for dyspnea on exertion and leg swelling.   Respiratory: Positive for shortness of breath.    Endocrine: Negative.    Hematologic/Lymphatic: Negative.    Skin: Negative.    Musculoskeletal: Negative.    Gastrointestinal: Positive for bloating, abdominal pain and anorexia.   Genitourinary: Negative.    Neurological: Negative.    Psychiatric/Behavioral: Negative.    Allergic/Immunologic: Negative.    All other systems reviewed and are negative.    Objective   Objective      Vital Signs  Temp:  [97.6 °F (36.4 °C)-98.1 °F (36.7 °C)] 98.1 °F (36.7 °C)  Heart Rate:  [115-128] 119  Resp:  [16-22] 20  BP: (113-146)/(65-86) 113/65  Oxygen Therapy  SpO2: 95 %  Pulse Oximetry Type: Intermittent  Device (Oxygen Therapy): room air  Flowsheet Rows      First Filed Value   Admission Height  185.4 cm (73\") Documented at 06/01/2021 1017   Admission Weight  136 kg (299 lb 13.2 oz) Documented at 06/01/2021 1016        Intake & Output (last 3 days)       05/30 0701 - 05/31 0700 05/31 0701 - 06/01 0700 06/01 0701 - 06/02 0700 06/02 0701 - 06/03 0700    P.O.   840 360    Total Intake(mL/kg)   840 (6.2) 360 (2.6)    Urine (mL/kg/hr)   200     Total Output   200     Net   +640 +360                Lines, Drains & Airways    Active LDAs     Name:   Placement date:   Placement time:   Site:   Days:    Peripheral IV 06/01/21 1111 Left Antecubital   06/01/21    1111    Antecubital   less than 1                Physical Exam  Vitals and nursing note reviewed.   Constitutional:       General: He is not in acute distress.     Appearance: He is well-developed. He is obese. He is not ill-appearing, toxic-appearing or diaphoretic.   HENT:      Head: Normocephalic and atraumatic.      Nose: Nose normal. No congestion or rhinorrhea.      Mouth/Throat:      Mouth: Mucous membranes are moist.      Pharynx: No oropharyngeal exudate.   Eyes:      General: Scleral icterus present.         Right eye: No discharge.         Left eye: " No discharge.      Extraocular Movements: Extraocular movements intact.      Pupils: Pupils are equal, round, and reactive to light.   Neck:      Thyroid: No thyromegaly.      Vascular: No carotid bruit or JVD.      Trachea: No tracheal deviation.   Cardiovascular:      Rate and Rhythm: Normal rate and regular rhythm.      Pulses: Normal pulses.      Heart sounds: Normal heart sounds. No murmur heard.   No friction rub. No gallop.    Pulmonary:      Effort: Pulmonary effort is normal. No respiratory distress.      Breath sounds: Normal breath sounds. No stridor. No wheezing, rhonchi or rales.   Chest:      Chest wall: No tenderness.   Abdominal:      General: Bowel sounds are normal. There is distension.      Palpations: There is no mass.      Tenderness: There is abdominal tenderness. There is no guarding or rebound.      Hernia: No hernia is present.   Musculoskeletal:         General: No swelling, tenderness, deformity or signs of injury. Normal range of motion.      Cervical back: Normal range of motion and neck supple. No rigidity. No muscular tenderness.      Right lower leg: No edema.      Left lower leg: No edema.   Lymphadenopathy:      Cervical: No cervical adenopathy.   Skin:     General: Skin is warm and dry.      Coloration: Skin is not jaundiced or pale.      Findings: No bruising, erythema or rash.   Neurological:      General: No focal deficit present.      Mental Status: He is alert and oriented to person, place, and time. Mental status is at baseline.      Cranial Nerves: No cranial nerve deficit.      Sensory: No sensory deficit.      Motor: No weakness or abnormal muscle tone.      Coordination: Coordination normal.   Psychiatric:         Mood and Affect: Mood normal.         Behavior: Behavior normal.         Thought Content: Thought content normal.         Judgment: Judgment normal.       Procedures:    Results Review:     I reviewed the patient's new clinical results.    Lab Results (last 24  hours)     Procedure Component Value Units Date/Time    Urine Culture - Urine, Urine, Clean Catch [[id number]]  (Abnormal) Collected: 06/01/21 1219    Specimen: Urine, Clean Catch Updated: 06/02/21 0850     Urine Culture <10,000 CFU/mL Gram Negative Bacilli    Narrative:      Call if further workup needed.      Body Fluid Culture - Body Fluid, Peritoneum [[id number]] Collected: 06/01/21 1436    Specimen: Body Fluid from Peritoneum Updated: 06/02/21 0820     Body Fluid Culture No growth at less than 24 hours     Gram Stain Few (2+) WBCs seen      No organisms seen    Anaerobic Culture - Body Fluid, Peritoneum [[id number]] Collected: 06/01/21 1436    Specimen: Body Fluid from Peritoneum Updated: 06/02/21 0820     Anaerobic Culture No anaerobes isolated at 24 hours    Basic Metabolic Panel [[id number]]  (Abnormal) Collected: 06/02/21 0534    Specimen: Blood Updated: 06/02/21 0619     Glucose 88 mg/dL      BUN 21 mg/dL      Creatinine 1.27 mg/dL      Sodium 133 mmol/L      Potassium 4.2 mmol/L      Chloride 103 mmol/L      CO2 21.0 mmol/L      Calcium 7.8 mg/dL      eGFR Non African Amer 65 mL/min/1.73      BUN/Creatinine Ratio 16.5     Anion Gap 9.0 mmol/L     Narrative:      GFR Normal >60  Chronic Kidney Disease <60  Kidney Failure <15      Phosphorus [[id number]]  (Normal) Collected: 06/02/21 0534    Specimen: Blood Updated: 06/02/21 0619     Phosphorus 3.5 mg/dL     Magnesium [[id number]]  (Normal) Collected: 06/02/21 0534    Specimen: Blood Updated: 06/02/21 0619     Magnesium 1.8 mg/dL     Hepatic Function Panel [[id number]]  (Abnormal) Collected: 06/02/21 0534    Specimen: Blood Updated: 06/02/21 0619     Total Protein 6.8 g/dL      Albumin 2.30 g/dL      ALT (SGPT) 29 U/L      AST (SGOT) 48 U/L      Alkaline Phosphatase 144 U/L      Total Bilirubin 4.4 mg/dL      Bilirubin, Direct 2.1 mg/dL      Bilirubin, Indirect 2.3 mg/dL     Lipase [[id number]]  (Abnormal) Collected: 06/02/21 0534    Specimen: Blood Updated:  06/02/21 0619     Lipase 73 U/L     Protime-INR [[id number]]  (Abnormal) Collected: 06/02/21 0534    Specimen: Blood Updated: 06/02/21 0604     Protime 16.3 Seconds      INR 1.51    CBC Auto Differential [[id number]]  (Abnormal) Collected: 06/02/21 0534    Specimen: Blood Updated: 06/02/21 0600     WBC 5.80 10*3/mm3      RBC 2.77 10*6/mm3      Hemoglobin 10.0 g/dL      Hematocrit 28.1 %      .6 fL      MCH 36.2 pg      MCHC 35.6 g/dL      RDW 15.2 %      RDW-SD 53.8 fl      MPV 7.1 fL      Platelets 60 10*3/mm3      Neutrophil % 59.8 %      Lymphocyte % 13.9 %      Monocyte % 16.0 %      Eosinophil % 9.5 %      Basophil % 0.8 %      Neutrophils, Absolute 3.50 10*3/mm3      Lymphocytes, Absolute 0.80 10*3/mm3      Monocytes, Absolute 0.90 10*3/mm3      Eosinophils, Absolute 0.60 10*3/mm3      Basophils, Absolute 0.00 10*3/mm3      nRBC 0.1 /100 WBC     Calcium, Ionized [[id number]]  (Abnormal) Collected: 06/02/21 0534    Specimen: Blood Updated: 06/02/21 0558     Ionized Calcium 1.14 mmol/L     Prealbumin [[id number]] Collected: 06/02/21 0534    Specimen: Blood Updated: 06/02/21 0544    Folate [[id number]]  (Abnormal) Collected: 06/01/21 1113    Specimen: Blood Updated: 06/01/21 2103     Folate 4.01 ng/mL     Narrative:      Results may be falsely increased if patient taking Biotin.      Vitamin B12 [[id number]]  (Abnormal) Collected: 06/01/21 1113    Specimen: Blood Updated: 06/01/21 2103     Vitamin B-12 1,309 pg/mL     Narrative:      Results may be falsely increased if patient taking Biotin.      Ammonia [[id number]]  (Normal) Collected: 06/01/21 1519    Specimen: Blood Updated: 06/01/21 1545     Ammonia 25 umol/L     Flow Cytometry [[id number]] Collected: 06/01/21 1436    Specimen: Body Fluid from Peritoneum Updated: 06/01/21 1444    Procalcitonin [[id number]]  (Normal) Collected: 06/01/21 1113    Specimen: Blood Updated: 06/01/21 1431     Procalcitonin 0.11 ng/mL     Narrative:      As a Marker for Sepsis  "(Non-Neonates):     1. <0.5 ng/mL represents a low risk of severe sepsis and/or septic shock.  2. >2 ng/mL represents a high risk of severe sepsis and/or septic shock.    As a Marker for Lower Respiratory Tract Infections that require antibiotic therapy:  PCT on Admission     Antibiotic Therapy             6-12 Hrs later  >0.5                          Strongly Recommended            >0.25 - <0.5             Recommended  0.1 - 0.25                  Discouraged                       Remeasure/reassess PCT  <0.1                         Strongly Discouraged         Remeasure/reassess PCT      As 28 day mortality risk marker: \"Change in Procalcitonin Result\" (>80% or <=80%) if Day 0 (or Day 1) and Day 4 values are available. Refer to http://[email or web address]-[email or web address]    Change in PCT <=80%   A decrease of PCT levels below or equal to 80% defines a positive change in PCT test result representing a higher risk for 28-day all-cause mortality of patients diagnosed with severe sepsis or septic shock.    Change in PCT >80%   A decrease of PCT levels of more than 80% defines a negative change in PCT result representing a lower risk for 28-day all-cause mortality of patients diagnosed with severe sepsis or septic shock.    This test is Prognostic not Diagnostic, if elevated correlate with clinical findings before administering antibiotic treatment.      Results may be falsely decreased if patient taking Biotin.     TSH [[id number]]  (Normal) Collected: 06/01/21 1113    Specimen: Blood Updated: 06/01/21 1428     TSH 4.110 uIU/mL         No results found for: HGBA1C  Results from last 7 days   Lab Units 06/02/21  0534 06/01/21  1113   INR  1.51* 1.41*           Lab Results   Component Value Date    LIPASE 73 (H) 06/02/2021     No results found for: CHOL, CHLPL, TRIG, HDL, LDL, LDLDIRECT    No results found for: INTRAOP, PREDX, FINALDX, COMDX    Microbiology Results (last 10 days)     Procedure Component Value - Date/Time    " Anaerobic Culture - Body Fluid, Peritoneum [[id number]] Collected: 06/01/21 1436    Lab Status: Preliminary result Specimen: Body Fluid from Peritoneum Updated: 06/02/21 0820     Anaerobic Culture No anaerobes isolated at 24 hours    Body Fluid Culture - Body Fluid, Peritoneum [[id number]] Collected: 06/01/21 1436    Lab Status: Preliminary result Specimen: Body Fluid from Peritoneum Updated: 06/02/21 0820     Body Fluid Culture No growth at less than 24 hours     Gram Stain Few (2+) WBCs seen      No organisms seen    Urine Culture - Urine, Urine, Clean Catch [[id number]]  (Abnormal) Collected: 06/01/21 1219    Lab Status: Final result Specimen: Urine, Clean Catch Updated: 06/02/21 0850     Urine Culture <10,000 CFU/mL Gram Negative Bacilli    Narrative:      Call if further workup needed.            ECG/EMG Results (most recent)     Procedure Component Value Units Date/Time    ECG 12 Lead [[id number]] Collected: 06/01/21 1319     Updated: 06/01/21 1321     QT Interval 382 ms     Narrative:      HEART RATE= 117  bpm  RR Interval= 512  ms  [place] Interval= 110  ms  P Horizontal Axis= -27  deg  P Front Axis= 69  deg  QRSD Interval= 105  ms  QT Interval= 382  ms  QRS Axis= 1  deg  T Wave Axis= -19  deg  - ABNORMAL ECG -  Sinus tachycardia  Prolonged QT interval  No previous ECG available for comparison  Electronically Signed By:   Date and Time of Study: 2021-06-01 13:19:53    Adult Transthoracic Echo Complete W/ Cont if Necessary Per Protocol [[id number]] Collected: 06/01/21 1554     Updated: 06/01/21 1724     BSA 2.6 m^2      RVIDd 2.8 cm      IVSd 1.5 cm      LVIDd 3.2 cm      LVIDs 2.2 cm      LVPWd 1.6 cm      IVS/LVPW 0.93     FS 31.6 %      EDV(Teich) 42.2 ml      ESV(Teich) 16.5 ml      EF(Teich) 60.9 %      EDV(cubed) 34.0 ml      ESV(cubed) 10.9 ml      EF(cubed) 68.0 %      LV mass(C)d 192.5 grams      LV mass(C)dI 75.4 grams/m^2      SV(Teich) 25.7 ml      SI(Teich) 10.1 ml/m^2      SV(cubed) 23.1 ml      SI(cubed)  9.1 ml/m^2      Ao root diam 2.8 cm      Ao root area 6.1 cm^2      ACS 2.5 cm      asc Aorta Diam 2.7 cm      LVOT diam 2.0 cm      LVOT area 3.2 cm^2      RVOT diam 3.7 cm      RVOT area 10.8 cm^2      EDV(MOD-sp4) 76.3 ml      ESV(MOD-sp4) 30.4 ml      EF(MOD-sp4) 60.1 %      EDV(MOD-sp2) 65.5 ml      ESV(MOD-sp2) 24.2 ml      EF(MOD-sp2) 63.1 %      SV(MOD-sp4) 45.9 ml      SI(MOD-sp4) 18.0 ml/m^2      SV(MOD-sp2) 41.3 ml      SI(MOD-sp2) 16.2 ml/m^2      Ao root area (BSA corrected) 1.1     LV Sheriff Vol (BSA corrected) 29.9 ml/m^2      LV Sys Vol (BSA corrected) 11.9 ml/m^2      Aortic R-R 0.5 sec      Aortic .3 BPM      MV E max ata 101.2 cm/sec      MV A max ata 124.1 cm/sec      MV E/A 0.82     MV V2 max 123.0 cm/sec      MV max PG 6.0 mmHg      MV V2 mean 91.2 cm/sec      MV mean PG 3.6 mmHg      MV V2 VTI 20.5 cm      MVA(VTI) 6.1 cm^2      MV dec slope 404.3 cm/sec^2      MV dec time 0.25 sec      Ao pk ata 253.1 cm/sec      Ao max PG 25.6 mmHg      Ao max PG (full) 14.2 mmHg      Ao V2 mean 183.4 cm/sec      Ao mean PG 15.0 mmHg      Ao mean PG (full) 6.5 mmHg      Ao V2 VTI 41.9 cm      DONITA(I,A) 3.0 cm^2      DONITA(I,D) 3.0 cm^2      DONITA(V,A) 2.2 cm^2      DONITA(V,D) 2.2 cm^2      LV V1 max PG 11.4 mmHg      LV V1 mean PG 8.5 mmHg      LV V1 max 168.9 cm/sec      LV V1 mean 141.6 cm/sec      LV V1 VTI 38.8 cm      CO(Ao) 30.7 l/min      CI(Ao) 12.0 l/min/m^2      SV(Ao) 254.9 ml      SI(Ao) 99.8 ml/m^2      CO(LVOT) 15.1 l/min      CI(LVOT) 5.9 l/min/m^2      SV(LVOT) 125.3 ml      SV(RVOT) 242.4 ml      SI(LVOT) 49.1 ml/m^2      PA V2 max 176.9 cm/sec      PA max PG 12.5 mmHg      PA max PG (full) 7.3 mmHg      PA V2 mean 129.3 cm/sec      PA mean PG 7.6 mmHg      PA mean PG (full) 4.5 mmHg      PA V2 VTI 29.0 cm      PVA(I,A) 8.3 cm^2      BH CV ECHO MIGUEL ÁNGEL - PVA(I,D) 8.3 cm^2      BH CV ECHO MIGUEL ÁNGEL - PVA(V,A) 6.9 cm^2      BH CV ECHO MIGUEL ÁNGEL - PVA(V,D) 6.9 cm^2      PA acc time 0.12 sec      RV V1 max PG  5.2 mmHg      RV V1 mean PG 3.1 mmHg      RV V1 max 113.7 cm/sec      RV V1 mean 79.7 cm/sec      RV V1 VTI 22.5 cm      TR max rell 353.7 cm/sec      RVSP(TR) 53.1 mmHg      RAP systole 3.0 mmHg      PA pr(Accel) 24.2 mmHg      Pulm Sys Rell 84.2 cm/sec      Pulm Sheriff Rell 57.8 cm/sec      Pulm S/D 1.5     Qp/Qs 1.9      CV ECHO MIGUEL ÁNGEL - BZI_BMI 39.4 kilograms/m^2       CV ECHO MIGUEL ÁNGEL - BSA(HAYCOCK) 2.7 m^2       CV ECHO MIGUEL ÁNGEL - BZI_METRIC_WEIGHT 135.6 kg       CV ECHO MIGUEL ÁNGEL - BZI_METRIC_HEIGHT 185.4 cm      EF(MOD-bp) 57.0 %      LA dimension(2D) 4.3 cm     Narrative:      · Estimated left ventricular EF was in disagreement with the calculated   left ventricular EF. Left ventricular ejection fraction appears to be   greater than 70%. Left ventricular systolic function is hyperdynamic (EF >   70%).  · Left ventricular diastolic function was normal.  · Mild aortic valve stenosis is present.  · Estimated right ventricular systolic pressure from tricuspid   regurgitation is normal (<35 mmHg).  · There is a large left pleural effusion. There is a large right pleural   effusion.     Preserved LV and RV size and function, EF 70% normal global and regional   wall motion  Normal atrial sizes grossly,  Mildly elevated aortic gradients peak of 25 mean of 13, no regurgitation,   nodular echodensity on the right and left coronary cusp margin immobile   similar to calcification, valve leaflets otherwise open freely  Trace MR posteriorly directed no stenosis  Trace to mild TR normal PA systolic pressure no stenosis  Pulmonic valve not well seen no stenosis by Doppler imaging  Normal diastolic function by criteria  Bilateral large left and right pleural effusions  Small circumferential pericardial effusion, no evidence of tamponade or   increased pericardial pressure seen      ECG 12 Lead [[id number]] Collected: 06/02/21 0555     Updated: 06/02/21 0557     QT Interval 369 ms     Narrative:      HEART RATE= 118  bpm  RR Interval=  508  ms  NC Interval= 117  ms  P Horizontal Axis= -59  deg  P Front Axis= 36  deg  QRSD Interval= 100  ms  QT Interval= 369  ms  QRS Axis= 0  deg  T Wave Axis=   deg  - ABNORMAL ECG -  Sinus tachycardia  Prolonged QT interval  Electronically Signed By:   Date and Time of Study: 2021-06-02 05:55:47              Results for orders placed during the hospital encounter of 06/01/21    Adult Transthoracic Echo Complete W/ Cont if Necessary Per Protocol    Interpretation Summary  · Estimated left ventricular EF was in disagreement with the calculated left ventricular EF. Left ventricular ejection fraction appears to be greater than 70%. Left ventricular systolic function is hyperdynamic (EF > 70%).  · Left ventricular diastolic function was normal.  · Mild aortic valve stenosis is present.  · Estimated right ventricular systolic pressure from tricuspid regurgitation is normal (<35 mmHg).  · There is a large left pleural effusion. There is a large right pleural effusion.    Preserved LV and RV size and function, EF 70% normal global and regional wall motion  Normal atrial sizes grossly,  Mildly elevated aortic gradients peak of 25 mean of 13, no regurgitation, nodular echodensity on the right and left coronary cusp margin immobile similar to calcification, valve leaflets otherwise open freely  Trace MR posteriorly directed no stenosis  Trace to mild TR normal PA systolic pressure no stenosis  Pulmonic valve not well seen no stenosis by Doppler imaging  Normal diastolic function by criteria  Bilateral large left and right pleural effusions  Small circumferential pericardial effusion, no evidence of tamponade or increased pericardial pressure seen      CT Abdomen Pelvis Without Contrast    Result Date: 6/1/2021  Advanced cirrhosis with splenomegaly, large volume diffuse ascites and marked anasarca.  Electronically Signed By-Rocael Kinsey MD On:6/1/2021 1:23 PM This report was finalized on [id number] by  Rocael Kinsey  MD.    US Liver    Result Date: 6/1/2021   1. The liver appears cirrhotic. 2. Large amount of ascites. 3. Gallbladder wall thickening could be due to cirrhosis and/or ascites.  Electronically Signed By-Liz Fox MD On:6/1/2021 7:53 PM This report was finalized on [id number] by  Liz Fox MD.    XR Chest 1 View    Result Date: 6/1/2021  Low lung volumes without acute cardiopulmonary abnormality.  Electronically Signed By-Rocael Kinsey MD On:6/1/2021 12:02 PM This report was finalized on [id number] by  Rocael Kinsey MD.          Xrays, labs reviewed personally by physician.    Medication Review:   I have reviewed the patient's current medication list      Scheduled Meds  albumin human, 37.5 g, Intravenous, Once   Or  albumin human, 50 g, Intravenous, Once   Or  albumin human, 62.5 g, Intravenous, Once   Or  albumin human, 75 g, Intravenous, Once   Or  albumin human, 87.5 g, Intravenous, Once   Or  albumin human, 100 g, Intravenous, Once   Or  albumin human, 112.5 g, Intravenous, Once  cefTRIAXone, 1 g, Intravenous, Q24H  folic acid, 1 mg, Oral, Daily  furosemide, 40 mg, Intravenous, Q8H  lactulose, 10 g, Oral, Daily  multivitamin, 1 tablet, Oral, Daily  sodium chloride, 10 mL, Intravenous, Q12H  thiamine, 100 mg, Oral, Daily      Meds Infusions     Meds PRN  •  acetaminophen **OR** acetaminophen **OR** acetaminophen  •  aluminum-magnesium hydroxide-simethicone  •  ketorolac  •  LORazepam **OR** LORazepam **OR** LORazepam **OR** LORazepam **OR** LORazepam **OR** LORazepam  •  magnesium sulfate **OR** magnesium sulfate in D5W 1g/100mL (PREMIX)  •  melatonin  •  nitroglycerin  •  ondansetron **OR** ondansetron  •  potassium chloride **OR** potassium chloride **OR** potassium chloride  •  sodium chloride    Assessment/Plan   Assessment/Plan     Active Hospital Problems    Diagnosis  POA   • **Anasarca [R60.1]  Yes   • Cirrhosis with alcoholism (CMS/HCC) [K70.30]  Yes   • Hyperbilirubinemia [E80.6]  Yes    • Ascites due to alcoholic cirrhosis (CMS/HCC) [K70.31]  Yes   • Thrombocytopenia (CMS/HCC) [D69.6]  Yes   • Coagulopathy (CMS/HCC) [D68.9]  Yes   • Macrocytic anemia [D53.9]  Yes   • Prolonged QT interval [R94.31]  Yes   • Acute UTI (urinary tract infection) [N39.0]  Yes   • Alcohol abuse [F10.10]  Yes      Resolved Hospital Problems   No resolved problems to display.       MEDICAL DECISION MAKING COMPLEXITY BY PROBLEM:      Anasarca / Ascites due to alcoholic cirrhosis  -paracentesis ordered  -Lasix 40 mg IV q8h  -monitor I&Os and daily weight  -2 g Na diet  -obtain 2D echo     Cirrhosis with alcoholism / Alcohol abuse  -patient reports he quit drinking 2 weeks ago  -continued abstinence encouraged  -obtain liver US  -consult GI  -initiate CIWA protocol with PRN Ativan and monitor for withdrawals  -start daily multivitamin, thiamine, and folic acid     Hyperbilirubinemia  -likely secondary to alcoholic cirrhosis  -consult GI  -monitor and trend      Thrombocytopenia   -likely secondary to alcoholic cirrhosis  -no active bleeding noted  -monitor and trend Plts     Coagulopathy  -likely secondary to alcoholic cirrhosis  -no active bleeding noted  -monitor and trend INR     Macrocytic anemia  -likely secondary to alcoholic cirrhosis  -B12 1309 and folate 4  -supplementation as above  -monitor and trend H&H     Prolonged QT interval  -etiology unclear, baseline unknown  -repeat EKG in am  -continuous cardiac monitoring      Acute UTI (urinary tract infection)  -urine culture pending  -ceftriaxone, follow culture       VTE Prophylaxis -   Mechanical Order History:      Ordered        06/01/21 1400  Place Sequential Compression Device  Once         06/01/21 1400  Maintain Sequential Compression Device  Continuous                 Pharmalogical Order History:     None            Code Status -   Code Status and Medical Interventions:   Ordered at: 06/01/21 1400     Code Status:    CPR     Medical Interventions (Level of  Support Prior to Arrest):    Full       This patient has been examined wearing appropriate Personal Protective Equipment and discussed with rn. 06/02/21        Discharge Planning  home        Electronically signed by Chi Reynolds MD, 06/02/21, 10:11 EDT.  [facility] Hospitalist Team

## 2021-06-03 LAB
MAGNESIUM SERPL-MCNC: 1.7 MG/DL (ref 1.6–2.6)
POTASSIUM SERPL-SCNC: 3.9 MMOL/L (ref 3.5–5.2)
QT INTERVAL: 382 MS

## 2021-06-03 PROCEDURE — 84132 ASSAY OF SERUM POTASSIUM: CPT | Performed by: NURSE PRACTITIONER

## 2021-06-03 PROCEDURE — 83735 ASSAY OF MAGNESIUM: CPT | Performed by: NURSE PRACTITIONER

## 2021-06-03 PROCEDURE — 25010000002 CEFTRIAXONE PER 250 MG: Performed by: NURSE PRACTITIONER

## 2021-06-03 PROCEDURE — 25010000002 ALBUMIN HUMAN 25% PER 50 ML: Performed by: INTERNAL MEDICINE

## 2021-06-03 PROCEDURE — 36415 COLL VENOUS BLD VENIPUNCTURE: CPT | Performed by: NURSE PRACTITIONER

## 2021-06-03 PROCEDURE — 25010000002 MAGNESIUM SULFATE IN D5W 1G/100ML (PREMIX) 1-5 GM/100ML-% SOLUTION: Performed by: NURSE PRACTITIONER

## 2021-06-03 PROCEDURE — P9047 ALBUMIN (HUMAN), 25%, 50ML: HCPCS | Performed by: INTERNAL MEDICINE

## 2021-06-03 PROCEDURE — 99233 SBSQ HOSP IP/OBS HIGH 50: CPT | Performed by: INTERNAL MEDICINE

## 2021-06-03 RX ORDER — ALBUMIN (HUMAN) 12.5 G/50ML
25 SOLUTION INTRAVENOUS ONCE
Status: DISCONTINUED | OUTPATIENT
Start: 2021-06-03 | End: 2021-06-03

## 2021-06-03 RX ORDER — ALBUMIN (HUMAN) 12.5 G/50ML
87.5 SOLUTION INTRAVENOUS ONCE
Status: DISCONTINUED | OUTPATIENT
Start: 2021-06-03 | End: 2021-06-03

## 2021-06-03 RX ORDER — ALBUMIN (HUMAN) 12.5 G/50ML
75 SOLUTION INTRAVENOUS ONCE
Status: DISCONTINUED | OUTPATIENT
Start: 2021-06-03 | End: 2021-06-03

## 2021-06-03 RX ORDER — FUROSEMIDE 20 MG/1
20 TABLET ORAL
Status: DISCONTINUED | OUTPATIENT
Start: 2021-06-04 | End: 2021-06-04 | Stop reason: HOSPADM

## 2021-06-03 RX ORDER — ALBUMIN (HUMAN) 12.5 G/50ML
25 SOLUTION INTRAVENOUS ONCE
Status: COMPLETED | OUTPATIENT
Start: 2021-06-03 | End: 2021-06-03

## 2021-06-03 RX ORDER — ALBUMIN (HUMAN) 12.5 G/50ML
100 SOLUTION INTRAVENOUS ONCE
Status: DISCONTINUED | OUTPATIENT
Start: 2021-06-03 | End: 2021-06-03

## 2021-06-03 RX ORDER — MIDODRINE HYDROCHLORIDE 5 MG/1
5 TABLET ORAL ONCE
Status: COMPLETED | OUTPATIENT
Start: 2021-06-03 | End: 2021-06-03

## 2021-06-03 RX ORDER — ALBUMIN (HUMAN) 12.5 G/50ML
112.5 SOLUTION INTRAVENOUS ONCE
Status: DISCONTINUED | OUTPATIENT
Start: 2021-06-03 | End: 2021-06-03

## 2021-06-03 RX ORDER — ALBUMIN (HUMAN) 12.5 G/50ML
62.5 SOLUTION INTRAVENOUS ONCE
Status: DISCONTINUED | OUTPATIENT
Start: 2021-06-03 | End: 2021-06-03

## 2021-06-03 RX ORDER — ALBUMIN (HUMAN) 12.5 G/50ML
50 SOLUTION INTRAVENOUS ONCE
Status: DISCONTINUED | OUTPATIENT
Start: 2021-06-03 | End: 2021-06-03

## 2021-06-03 RX ADMIN — ALBUMIN HUMAN 25 G: 0.25 SOLUTION INTRAVENOUS at 13:51

## 2021-06-03 RX ADMIN — MIDODRINE HYDROCHLORIDE 5 MG: 5 TABLET ORAL at 13:50

## 2021-06-03 RX ADMIN — SODIUM CHLORIDE 500 ML: 9 INJECTION, SOLUTION INTRAVENOUS at 15:22

## 2021-06-03 RX ADMIN — Medication 10 ML: at 08:39

## 2021-06-03 RX ADMIN — CEFTRIAXONE SODIUM 1 G: 1 INJECTION, POWDER, FOR SOLUTION INTRAMUSCULAR; INTRAVENOUS at 15:23

## 2021-06-03 RX ADMIN — FUROSEMIDE 40 MG: 40 TABLET ORAL at 08:36

## 2021-06-03 RX ADMIN — THERA TABS 1 TABLET: TAB at 08:37

## 2021-06-03 RX ADMIN — FOLIC ACID 1 MG: 1 TABLET ORAL at 08:38

## 2021-06-03 RX ADMIN — LACTULOSE 10 G: 20 SOLUTION ORAL at 08:39

## 2021-06-03 RX ADMIN — Medication 100 MG: at 08:38

## 2021-06-03 RX ADMIN — MAGNESIUM SULFATE HEPTAHYDRATE 1 G: 1 INJECTION, SOLUTION INTRAVENOUS at 08:40

## 2021-06-03 RX ADMIN — ZINC SULFATE 220 MG (50 MG) CAPSULE 220 MG: CAPSULE at 08:37

## 2021-06-03 RX ADMIN — Medication 10 ML: at 20:01

## 2021-06-03 RX ADMIN — SPIRONOLACTONE 100 MG: 100 TABLET ORAL at 08:37

## 2021-06-03 NOTE — PROGRESS NOTES
"      [facility] Medicine Services Daily Progress Note      Hospitalist Team  LOS 2 days      Patient Care Team:  Provider, No Known as PCP - General    Patient Location: 300/1      Subjective   Subjective     Chief Complaint / Subjective  Chief Complaint   Patient presents with   • Edema     pt states he started swelling getting worse over last 2 weeks, more soa,  pt reports weigh gain of 75lb in last 2 weeks     Brief Synopsis of Hospital Course/HPI  35 y.o. male with a history of alcohol abuse who presents to [facility] ED on 06/01/2021 complaining of swelling of his legs and abdomen. He states the swelling started approximately 4 weeks ago and has gotten progressively worse. He reports associated shortness of breath, but denies chest pain, cough, congestion, fever, or chills. He denies any exacerbating or alleviating factors. He states he quit drinking alcohol 2 weeks ago. He does not take any home medications, except an occasional Tylenol. He does not have a PCP. He denies tobacco or illicit drug use. He states for the past 10 years on average he would drink 4 alcoholic beverages 3 times per week, plus he would drink on the weekends. He reports a family history of alcoholism.      Workup in the ER was significant for hgb 11.1, Plt 63, INR 1.41, Na 134, bilirubin 4.8, albumin 2.40, alk phos 173, AST 56, lipase 76. His alcohol level was negative. His EKG showed sinus tachycardia with prolonged QT interval. His urinalysis was abnormal and culture reflexed. His CT abd/pelvis showed \"Advanced cirrhosis with splenomegaly, large volume diffuse ascites and marked anasarca.\" He was admitted for further evaluation and treatment.     Date:  6/2/21: Patient seen and examined in bed no acute distress, complaining of abdominal distention edema, to undergo paracentesis today.  6/3/21 bp low this am.  Give albumin and a fluid bolus.  Midodrine x1    Review of Systems   Constitutional: Positive for " "malaise/fatigue and weight gain.   HENT: Negative.    Eyes: Negative.    Cardiovascular: Positive for dyspnea on exertion and leg swelling.   Respiratory: Positive for shortness of breath.    Endocrine: Negative.    Hematologic/Lymphatic: Negative.    Skin: Negative.    Musculoskeletal: Negative.    Gastrointestinal: Positive for bloating, abdominal pain and anorexia.   Genitourinary: Negative.    Neurological: Negative.    Psychiatric/Behavioral: Negative.    Allergic/Immunologic: Negative.    All other systems reviewed and are negative.    Objective   Objective      Vital Signs  Temp:  [97.5 °F (36.4 °C)-98 °F (36.7 °C)] 98 °F (36.7 °C)  Heart Rate:  [103-120] 114  Resp:  [16-18] 18  BP: ()/(32-58) 96/58  Oxygen Therapy  SpO2: 98 %  Pulse Oximetry Type: Intermittent  Device (Oxygen Therapy): room air  Flowsheet Rows      First Filed Value   Admission Height  185.4 cm (73\") Documented at 06/01/2021 1017   Admission Weight  136 kg (299 lb 13.2 oz) Documented at 06/01/2021 1016        Intake & Output (last 3 days)       05/31 0701 - 06/01 0700 06/01 0701 - 06/02 0700 06/02 0701 - 06/03 0700 06/03 0701 - 06/04 0700    P.O.  840 960     IV Piggyback   100     Total Intake(mL/kg)  840 (6.2) 1060 (8.3)     Urine (mL/kg/hr)  200 875 (0.3)     Total Output  200 875     Net  +640 +185                 Lines, Drains & Airways    Active LDAs     Name:   Placement date:   Placement time:   Site:   Days:    Peripheral IV 06/01/21 1111 Left Antecubital   06/01/21    1111    Antecubital   less than 1                Physical Exam  Vitals and nursing note reviewed.   Constitutional:       General: He is not in acute distress.     Appearance: He is well-developed. He is obese. He is not ill-appearing, toxic-appearing or diaphoretic.   HENT:      Head: Normocephalic and atraumatic.      Nose: Nose normal. No congestion or rhinorrhea.      Mouth/Throat:      Mouth: Mucous membranes are moist.      Pharynx: No oropharyngeal " exudate.   Eyes:      General: Scleral icterus present.         Right eye: No discharge.         Left eye: No discharge.      Extraocular Movements: Extraocular movements intact.      Pupils: Pupils are equal, round, and reactive to light.   Neck:      Thyroid: No thyromegaly.      Vascular: No carotid bruit or JVD.      Trachea: No tracheal deviation.   Cardiovascular:      Rate and Rhythm: Normal rate and regular rhythm.      Pulses: Normal pulses.      Heart sounds: Normal heart sounds. No murmur heard.   No friction rub. No gallop.    Pulmonary:      Effort: Pulmonary effort is normal. No respiratory distress.      Breath sounds: Normal breath sounds. No stridor. No wheezing, rhonchi or rales.   Chest:      Chest wall: No tenderness.   Abdominal:      General: Bowel sounds are normal. There is distension.      Palpations: There is no mass.      Tenderness: There is abdominal tenderness. There is no guarding or rebound.      Hernia: No hernia is present.   Musculoskeletal:         General: No swelling, tenderness, deformity or signs of injury. Normal range of motion.      Cervical back: Normal range of motion and neck supple. No rigidity. No muscular tenderness.      Right lower leg: No edema.      Left lower leg: No edema.   Lymphadenopathy:      Cervical: No cervical adenopathy.   Skin:     General: Skin is warm and dry.      Coloration: Skin is not jaundiced or pale.      Findings: No bruising, erythema or rash.   Neurological:      General: No focal deficit present.      Mental Status: He is alert and oriented to person, place, and time. Mental status is at baseline.      Cranial Nerves: No cranial nerve deficit.      Sensory: No sensory deficit.      Motor: No weakness or abnormal muscle tone.      Coordination: Coordination normal.   Psychiatric:         Mood and Affect: Mood normal.         Behavior: Behavior normal.         Thought Content: Thought content normal.         Judgment: Judgment normal.        Procedures:    Results Review:     I reviewed the patient's new clinical results.    Lab Results (last 24 hours)     Procedure Component Value Units Date/Time    Flow Cytometry [[id number]] Collected: 06/01/21 1436    Specimen: Body Fluid from Peritoneum Updated: 06/03/21 1045     Reference Lab Report See Attached Report    Potassium [[id number]]  (Normal) Collected: 06/03/21 0532    Specimen: Blood Updated: 06/03/21 0647     Potassium 3.9 mmol/L     Magnesium [[id number]]  (Normal) Collected: 06/03/21 0532    Specimen: Blood Updated: 06/03/21 0647     Magnesium 1.7 mg/dL     Anaerobic Culture - Body Fluid, Peritoneum [[id number]] Collected: 06/01/21 1436    Specimen: Body Fluid from Peritoneum Updated: 06/03/21 0636     Anaerobic Culture No anaerobes isolated at 2 days    Body Fluid Culture - Body Fluid, Peritoneum [[id number]] Collected: 06/01/21 1436    Specimen: Body Fluid from Peritoneum Updated: 06/03/21 0636     Body Fluid Culture No growth at 2 days     Gram Stain Few (2+) WBCs seen      No organisms seen    Body Fluid Culture - Body Fluid, Peritoneum [[id number]] Collected: 06/02/21 1406    Specimen: Body Fluid from Peritoneum Updated: 06/03/21 0633     Body Fluid Culture No growth at less than 24 hours     Gram Stain Few (2+) WBCs seen      No organisms seen    Albumin, Fluid - Body Fluid, Peritoneum [[id number]] Collected: 06/02/21 1406    Specimen: Body Fluid from Peritoneum Updated: 06/02/21 2024     Albumin, Fluid <0.40 g/dL     Narrative:      No Reference Ranges Established.    A Serous fluid albumin gradient (serum albumin-fluid) <1.1 g/dL suggests the fluid is an exudate.  Cirrhosis usually results in an ascites fluid albumin gradient >1.1 g/dL.    This test was developed, its performance characteristics determined and judged suitable for clinical purposes by [facility].  It has not been cleared or approved by the FDA.  The laboratory is regulated under CLIA as qualified to  perfom high-complexity testing.      Protein, Body Fluid - Peritoneal Fluid, Peritoneum [[id number]] Collected: 06/02/21 1406    Specimen: Peritoneal Fluid from Peritoneum Updated: 06/02/21 1957     Protein, Total, Fluid <1.0 g/dL     Narrative:      No Reference Ranges Established.    A serous fluid total fluid (TP) greater than 50 percent of the serum TP suggests the fluid is an exudate.      1. Pleural TP/Serum TP >0.5  2. Pleural LD/Serum LD >0.6  3. Pleural LD >2/3 of the upper limit of normal for serum LDH    This test was developed, it performance characteristics determined and judged suitable for clinical purposes by [facility].  It has not been cleared or approved by the FDA.  The laboratory is regulated under CLIA as qualified to perform high-complexity testing.         No results found for: HGBA1C  Results from last 7 days   Lab Units 06/02/21  0534 06/01/21  1113   INR  1.51* 1.41*           Lab Results   Component Value Date    LIPASE 73 (H) 06/02/2021     No results found for: CHOL, CHLPL, TRIG, HDL, LDL, LDLDIRECT    No results found for: INTRAOP, PREDX, FINALDX, COMDX    Microbiology Results (last 10 days)     Procedure Component Value - Date/Time    Body Fluid Culture - Body Fluid, Peritoneum [[id number]] Collected: 06/02/21 1406    Lab Status: Preliminary result Specimen: Body Fluid from Peritoneum Updated: 06/03/21 0633     Body Fluid Culture No growth at less than 24 hours     Gram Stain Few (2+) WBCs seen      No organisms seen    Anaerobic Culture - Body Fluid, Peritoneum [[id number]] Collected: 06/01/21 1436    Lab Status: Preliminary result Specimen: Body Fluid from Peritoneum Updated: 06/03/21 0636     Anaerobic Culture No anaerobes isolated at 2 days    Body Fluid Culture - Body Fluid, Peritoneum [[id number]] Collected: 06/01/21 1436    Lab Status: Preliminary result Specimen: Body Fluid from Peritoneum Updated: 06/03/21 0636     Body Fluid Culture No growth at 2 days      Gram Stain Few (2+) WBCs seen      No organisms seen    Urine Culture - Urine, Urine, Clean Catch [[id number]]  (Abnormal) Collected: 06/01/21 1219    Lab Status: Final result Specimen: Urine, Clean Catch Updated: 06/02/21 0850     Urine Culture <10,000 CFU/mL Gram Negative Bacilli    Narrative:      Call if further workup needed.            ECG/EMG Results (most recent)     Procedure Component Value Units Date/Time    Adult Transthoracic Echo Complete W/ Cont if Necessary Per Protocol [[id number]] Collected: 06/01/21 1554     Updated: 06/01/21 1724     BSA 2.6 m^2      RVIDd 2.8 cm      IVSd 1.5 cm      LVIDd 3.2 cm      LVIDs 2.2 cm      LVPWd 1.6 cm      IVS/LVPW 0.93     FS 31.6 %      EDV(Teich) 42.2 ml      ESV(Teich) 16.5 ml      EF(Teich) 60.9 %      EDV(cubed) 34.0 ml      ESV(cubed) 10.9 ml      EF(cubed) 68.0 %      LV mass(C)d 192.5 grams      LV mass(C)dI 75.4 grams/m^2      SV(Teich) 25.7 ml      SI(Teich) 10.1 ml/m^2      SV(cubed) 23.1 ml      SI(cubed) 9.1 ml/m^2      Ao root diam 2.8 cm      Ao root area 6.1 cm^2      ACS 2.5 cm      asc Aorta Diam 2.7 cm      LVOT diam 2.0 cm      LVOT area 3.2 cm^2      RVOT diam 3.7 cm      RVOT area 10.8 cm^2      EDV(MOD-sp4) 76.3 ml      ESV(MOD-sp4) 30.4 ml      EF(MOD-sp4) 60.1 %      EDV(MOD-sp2) 65.5 ml      ESV(MOD-sp2) 24.2 ml      EF(MOD-sp2) 63.1 %      SV(MOD-sp4) 45.9 ml      SI(MOD-sp4) 18.0 ml/m^2      SV(MOD-sp2) 41.3 ml      SI(MOD-sp2) 16.2 ml/m^2      Ao root area (BSA corrected) 1.1     LV Sheriff Vol (BSA corrected) 29.9 ml/m^2      LV Sys Vol (BSA corrected) 11.9 ml/m^2      Aortic R-R 0.5 sec      Aortic .3 BPM      MV E max ata 101.2 cm/sec      MV A max ata 124.1 cm/sec      MV E/A 0.82     MV V2 max 123.0 cm/sec      MV max PG 6.0 mmHg      MV V2 mean 91.2 cm/sec      MV mean PG 3.6 mmHg      MV V2 VTI 20.5 cm      MVA(VTI) 6.1 cm^2      MV dec slope 404.3 cm/sec^2      MV dec time 0.25 sec      Ao pk ata 253.1 cm/sec      Ao max  PG 25.6 mmHg      Ao max PG (full) 14.2 mmHg      Ao V2 mean 183.4 cm/sec      Ao mean PG 15.0 mmHg      Ao mean PG (full) 6.5 mmHg      Ao V2 VTI 41.9 cm      DONITA(I,A) 3.0 cm^2      DONITA(I,D) 3.0 cm^2      DONITA(V,A) 2.2 cm^2      ODNITA(V,D) 2.2 cm^2      LV V1 max PG 11.4 mmHg      LV V1 mean PG 8.5 mmHg      LV V1 max 168.9 cm/sec      LV V1 mean 141.6 cm/sec      LV V1 VTI 38.8 cm      CO(Ao) 30.7 l/min      CI(Ao) 12.0 l/min/m^2      SV(Ao) 254.9 ml      SI(Ao) 99.8 ml/m^2      CO(LVOT) 15.1 l/min      CI(LVOT) 5.9 l/min/m^2      SV(LVOT) 125.3 ml      SV(RVOT) 242.4 ml      SI(LVOT) 49.1 ml/m^2      PA V2 max 176.9 cm/sec      PA max PG 12.5 mmHg      PA max PG (full) 7.3 mmHg      PA V2 mean 129.3 cm/sec      PA mean PG 7.6 mmHg      PA mean PG (full) 4.5 mmHg      PA V2 VTI 29.0 cm      PVA(I,A) 8.3 cm^2       CV ECHO MIGUEL ÁNGEL - PVA(I,D) 8.3 cm^2       CV ECHO MIGUEL ÁNGEL - PVA(V,A) 6.9 cm^2       CV ECHO MIGUEL ÁNGEL - PVA(V,D) 6.9 cm^2      PA acc time 0.12 sec      RV V1 max PG 5.2 mmHg      RV V1 mean PG 3.1 mmHg      RV V1 max 113.7 cm/sec      RV V1 mean 79.7 cm/sec      RV V1 VTI 22.5 cm      TR max rell 353.7 cm/sec      RVSP(TR) 53.1 mmHg      RAP systole 3.0 mmHg      PA pr(Accel) 24.2 mmHg      Pulm Sys Rell 84.2 cm/sec      Pulm Sheriff Rell 57.8 cm/sec      Pulm S/D 1.5     Qp/Qs 1.9      CV ECHO MIGUEL ÁNGEL - BZI_BMI 39.4 kilograms/m^2       CV ECHO MIGUEL ÁNGEL - BSA([facility]) 2.7 m^2      BH CV ECHO MIGUEL ÁNGEL - BZI_METRIC_WEIGHT 135.6 kg      BH CV ECHO MIGUEL ÁNGEL - BZI_METRIC_HEIGHT 185.4 cm      EF(MOD-bp) 57.0 %      LA dimension(2D) 4.3 cm     Narrative:      · Estimated left ventricular EF was in disagreement with the calculated   left ventricular EF. Left ventricular ejection fraction appears to be   greater than 70%. Left ventricular systolic function is hyperdynamic (EF >   70%).  · Left ventricular diastolic function was normal.  · Mild aortic valve stenosis is present.  · Estimated right ventricular systolic pressure from  tricuspid   regurgitation is normal (<35 mmHg).  · There is a large left pleural effusion. There is a large right pleural   effusion.     Preserved LV and RV size and function, EF 70% normal global and regional   wall motion  Normal atrial sizes grossly,  Mildly elevated aortic gradients peak of 25 mean of 13, no regurgitation,   nodular echodensity on the right and left coronary cusp margin immobile   similar to calcification, valve leaflets otherwise open freely  Trace MR posteriorly directed no stenosis  Trace to mild TR normal PA systolic pressure no stenosis  Pulmonic valve not well seen no stenosis by Doppler imaging  Normal diastolic function by criteria  Bilateral large left and right pleural effusions  Small circumferential pericardial effusion, no evidence of tamponade or   increased pericardial pressure seen      ECG 12 Lead [[id number]] Collected: 06/02/21 0555     Updated: 06/02/21 0557     QT Interval 369 ms     Narrative:      HEART RATE= 118  bpm  RR Interval= 508  ms  KS Interval= 117  ms  P Horizontal Axis= -59  deg  P Front Axis= 36  deg  QRSD Interval= 100  ms  QT Interval= 369  ms  QRS Axis= 0  deg  T Wave Axis=   deg  - ABNORMAL ECG -  Sinus tachycardia  Prolonged QT interval  Electronically Signed By:   Date and Time of Study: 2021-06-02 05:55:47    ECG 12 Lead [[id number]] Collected: 06/01/21 1319     Updated: 06/03/21 1406     QT Interval 382 ms     Narrative:      HEART RATE= 117  bpm  RR Interval= 512  ms  KS Interval= 110  ms  P Horizontal Axis= -27  deg  P Front Axis= 69  deg  QRSD Interval= 105  ms  QT Interval= 382  ms  QRS Axis= 1  deg  T Wave Axis= -19  deg  - ABNORMAL ECG -  Sinus tachycardia  Prolonged QT interval  No previous ECG available for comparison  Electronically Signed By: Yogi Morgan (KEYON) 03-Jun-2021 14:05:56  Date and Time of Study: 2021-06-01 13:19:53              Results for orders placed during the hospital encounter of 06/01/21    Adult Transthoracic Echo Complete W/  Cont if Necessary Per Protocol    Interpretation Summary  · Estimated left ventricular EF was in disagreement with the calculated left ventricular EF. Left ventricular ejection fraction appears to be greater than 70%. Left ventricular systolic function is hyperdynamic (EF > 70%).  · Left ventricular diastolic function was normal.  · Mild aortic valve stenosis is present.  · Estimated right ventricular systolic pressure from tricuspid regurgitation is normal (<35 mmHg).  · There is a large left pleural effusion. There is a large right pleural effusion.    Preserved LV and RV size and function, EF 70% normal global and regional wall motion  Normal atrial sizes grossly,  Mildly elevated aortic gradients peak of 25 mean of 13, no regurgitation, nodular echodensity on the right and left coronary cusp margin immobile similar to calcification, valve leaflets otherwise open freely  Trace MR posteriorly directed no stenosis  Trace to mild TR normal PA systolic pressure no stenosis  Pulmonic valve not well seen no stenosis by Doppler imaging  Normal diastolic function by criteria  Bilateral large left and right pleural effusions  Small circumferential pericardial effusion, no evidence of tamponade or increased pericardial pressure seen      CT Abdomen Pelvis Without Contrast    Result Date: 6/1/2021  Advanced cirrhosis with splenomegaly, large volume diffuse ascites and marked anasarca.  Electronically Signed By-Rocael Kinsey MD On:6/1/2021 1:23 PM This report was finalized on [id number] by  Rocael Kinsey MD.    US Liver    Result Date: 6/1/2021   1. The liver appears cirrhotic. 2. Large amount of ascites. 3. Gallbladder wall thickening could be due to cirrhosis and/or ascites.  Electronically Signed By-Liz Fox MD On:6/1/2021 7:53 PM This report was finalized on [id number] by  Liz Fox MD.    XR Chest 1 View    Result Date: 6/1/2021  Low lung volumes without acute cardiopulmonary abnormality.   Electronically Signed By-Rocael Kinsey MD On:6/1/2021 12:02 PM This report was finalized on [id number] by  Rocael Kinsey MD.          Xrays, labs reviewed personally by physician.    Medication Review:   I have reviewed the patient's current medication list      Scheduled Meds  cefTRIAXone, 1 g, Intravenous, Q24H  folic acid, 1 mg, Oral, Daily  furosemide, 20 mg, Oral, BID  lactulose, 10 g, Oral, Daily  multivitamin, 1 tablet, Oral, Daily  sodium chloride, 10 mL, Intravenous, Q12H  spironolactone, 100 mg, Oral, Daily  thiamine, 100 mg, Oral, Daily  zinc sulfate, 220 mg, Oral, Daily      Meds Infusions     Meds PRN  •  acetaminophen **OR** acetaminophen **OR** acetaminophen  •  aluminum-magnesium hydroxide-simethicone  •  LORazepam **OR** LORazepam **OR** LORazepam **OR** LORazepam **OR** LORazepam **OR** LORazepam  •  magnesium sulfate **OR** magnesium sulfate in D5W 1g/100mL (PREMIX)  •  melatonin  •  nitroglycerin  •  ondansetron **OR** ondansetron  •  potassium chloride **OR** potassium chloride **OR** potassium chloride  •  sodium chloride    Assessment/Plan   Assessment/Plan     Active Hospital Problems    Diagnosis  POA   • **Anasarca [R60.1]  Yes   • Cirrhosis with alcoholism (CMS/HCC) [K70.30]  Yes   • Hyperbilirubinemia [E80.6]  Yes   • Ascites due to alcoholic cirrhosis (CMS/HCC) [K70.31]  Yes   • Thrombocytopenia (CMS/HCC) [D69.6]  Yes   • Coagulopathy (CMS/HCC) [D68.9]  Yes   • Macrocytic anemia [D53.9]  Yes   • Prolonged QT interval [R94.31]  Yes   • Acute UTI (urinary tract infection) [N39.0]  Yes   • Alcohol abuse [F10.10]  Yes      Resolved Hospital Problems   No resolved problems to display.       MEDICAL DECISION MAKING COMPLEXITY BY PROBLEM:      Anasarca / Ascites due to alcoholic cirrhosis  -paracentesis ordered  -Lasix 40 mg IV q8h  -monitor I&Os and daily weight  -2 g Na diet  -obtain 2D echo     Cirrhosis with alcoholism / Alcohol abuse  -patient reports he quit drinking 2 weeks  ago  -continued abstinence encouraged  -obtain liver US  -consult GI  -initiate CIWA protocol with PRN Ativan and monitor for withdrawals  -start daily multivitamin, thiamine, and folic acid     Hyperbilirubinemia  -likely secondary to alcoholic cirrhosis  -consult GI  -monitor and trend      Thrombocytopenia   -likely secondary to alcoholic cirrhosis  -no active bleeding noted  -monitor and trend Plts     Coagulopathy  -likely secondary to alcoholic cirrhosis  -no active bleeding noted  -monitor and trend INR     Macrocytic anemia  -likely secondary to alcoholic cirrhosis  -B12 1309 and folate 4  -supplementation as above  -monitor and trend H&H     Prolonged QT interval  -etiology unclear, baseline unknown  -repeat EKG in am  -continuous cardiac monitoring      Acute UTI (urinary tract infection)  -urine culture pending  -ceftriaxone, follow culture     Hypotension  -Fluid bolus  -Midodrine  -Albumin IV  VTE Prophylaxis -   Mechanical Order History:      Ordered        06/01/21 1400  Place Sequential Compression Device  Once         06/01/21 1400  Maintain Sequential Compression Device  Continuous                 Pharmalogical Order History:     None            Code Status -   Code Status and Medical Interventions:   Ordered at: 06/01/21 1400     Code Status:    CPR     Medical Interventions (Level of Support Prior to Arrest):    Full       This patient has been examined wearing appropriate Personal Protective Equipment and discussed with rn. 06/03/21        Discharge Planning  home        Electronically signed by Chi Reynolds MD, 06/03/21, 14:09 EDT.  [facility] Hospitalist Team

## 2021-06-03 NOTE — PLAN OF CARE
Goal Outcome Evaluation:        Outcome Summary: Patient has received ordered prn magnesium (see orders and MAR). Patient is on cardiac monitoring. Will continue to observe.

## 2021-06-03 NOTE — CONSULTS
Nutrition Services    Patient Name: Adam Alvares Jr.  YOB: 1985  MRN: [id number]  Admission date: 6/1/2021      PPE Documentation        PPE Worn By Provider mask and eye protection   PPE Worn By Patient  None      Nutrition Counseling & Education       Reason for Visit: APRN/PA Consult     Session topic: Diet rationale, Key food habit change and Sodium Restriction     Session comments: Provided nutrition education regarding low sodium diet to reduce abdominal swelling      Provided print material via: [employer]-Nutrition Care Manual      Goals: Increase knowledge on diet/nutrition effects on condition/status      Monitoring/Follow Up: RD to follow up PRN           Electronically signed by:  Alea Heck  06/03/21 16:12 EDT

## 2021-06-03 NOTE — PLAN OF CARE
Goal Outcome Evaluation:         Patient resting abed, no distress noted. Patient denies any pain or discomfort. He states he feels a lot better. Patient's blood pressure low after his paracentesis, but has increased and improved some through out the night. He is hoping to be discharged today. Will continue to monitor.

## 2021-06-03 NOTE — CASE MANAGEMENT/SOCIAL WORK
Continued Stay Note  [facility]     Patient Name: Adam Alvares Jr.  MRN: [id number]  Today's Date: 6/3/2021    Admit Date: 6/1/2021    Discharge Plan     Row Name 06/03/21 1202       Plan    Plan Comments  Patient had discharge orders yesterday, however did not d/c due to hypotension. Per RN report in rounds today, patient remains tachycardic. Remains on IV abx. Awaiting MD rounds.         Phone communication or documentation only - no physical contact with patient or family.        Teresa Wren RN

## 2021-06-04 VITALS
HEIGHT: 73 IN | HEART RATE: 115 BPM | RESPIRATION RATE: 18 BRPM | DIASTOLIC BLOOD PRESSURE: 67 MMHG | OXYGEN SATURATION: 94 % | WEIGHT: 280.2 LBS | BODY MASS INDEX: 37.14 KG/M2 | TEMPERATURE: 98 F | SYSTOLIC BLOOD PRESSURE: 120 MMHG

## 2021-06-04 LAB
MAGNESIUM SERPL-MCNC: 1.9 MG/DL (ref 1.6–2.6)
POTASSIUM SERPL-SCNC: 4.1 MMOL/L (ref 3.5–5.2)
QT INTERVAL: 369 MS

## 2021-06-04 PROCEDURE — 84132 ASSAY OF SERUM POTASSIUM: CPT | Performed by: NURSE PRACTITIONER

## 2021-06-04 PROCEDURE — 83735 ASSAY OF MAGNESIUM: CPT | Performed by: NURSE PRACTITIONER

## 2021-06-04 PROCEDURE — 99239 HOSP IP/OBS DSCHRG MGMT >30: CPT | Performed by: INTERNAL MEDICINE

## 2021-06-04 RX ORDER — POTASSIUM CHLORIDE 750 MG/1
10 TABLET, FILM COATED, EXTENDED RELEASE ORAL EVERY OTHER DAY
Qty: 30 TABLET | Refills: 0 | Status: ON HOLD | OUTPATIENT
Start: 2021-06-04 | End: 2021-09-28

## 2021-06-04 RX ORDER — CEFDINIR 300 MG/1
300 CAPSULE ORAL 2 TIMES DAILY
Qty: 14 CAPSULE | Refills: 0 | Status: SHIPPED | OUTPATIENT
Start: 2021-06-04 | End: 2021-10-01 | Stop reason: HOSPADM

## 2021-06-04 RX ORDER — POTASSIUM CHLORIDE 750 MG/1
20 TABLET, FILM COATED, EXTENDED RELEASE ORAL DAILY
Qty: 30 TABLET | Refills: 0 | Status: SHIPPED | OUTPATIENT
Start: 2021-06-04 | End: 2021-06-04 | Stop reason: SDUPTHER

## 2021-06-04 RX ORDER — SACCHAROMYCES BOULARDII 250 MG
250 CAPSULE ORAL 2 TIMES DAILY
Qty: 30 CAPSULE | Refills: 0 | Status: SHIPPED | OUTPATIENT
Start: 2021-06-04 | End: 2022-09-26 | Stop reason: HOSPADM

## 2021-06-04 RX ADMIN — Medication 10 ML: at 08:27

## 2021-06-04 RX ADMIN — THERA TABS 1 TABLET: TAB at 08:27

## 2021-06-04 RX ADMIN — Medication 100 MG: at 08:27

## 2021-06-04 RX ADMIN — FUROSEMIDE 20 MG: 20 TABLET ORAL at 08:27

## 2021-06-04 RX ADMIN — ZINC SULFATE 220 MG (50 MG) CAPSULE 220 MG: CAPSULE at 08:27

## 2021-06-04 RX ADMIN — FOLIC ACID 1 MG: 1 TABLET ORAL at 08:27

## 2021-06-04 RX ADMIN — SPIRONOLACTONE 100 MG: 100 TABLET ORAL at 08:27

## 2021-06-04 RX ADMIN — LACTULOSE 10 G: 20 SOLUTION ORAL at 08:27

## 2021-06-04 NOTE — DISCHARGE SUMMARY
[facility] Medicine Services  DISCHARGE SUMMARY        Prepared For PCP:  Provider, No Known    Patient Name: Adam Alvares Jr.  : 1985  MRN: [id number]      Date of Admission:   2021    Date of Discharge:  2021    Length of stay:  LOS: 3 days     Hospital Course     Presenting Problem:   Anasarca [R60.1]  Cirrhosis with alcoholism (CMS/HCC) [K70.30]  Ascites due to alcoholic cirrhosis (CMS/HCC) [K70.31]      Active Hospital Problems    Diagnosis  POA   • **Anasarca [R60.1]  Yes   • Cirrhosis with alcoholism (CMS/HCC) [K70.30]  Yes   • Hyperbilirubinemia [E80.6]  Yes   • Ascites due to alcoholic cirrhosis (CMS/HCC) [K70.31]  Yes   • Thrombocytopenia (CMS/HCC) [D69.6]  Yes   • Coagulopathy (CMS/HCC) [D68.9]  Yes   • Macrocytic anemia [D53.9]  Yes   • Prolonged QT interval [R94.31]  Yes   • Acute UTI (urinary tract infection) [N39.0]  Yes   • Alcohol abuse [F10.10]  Yes      Resolved Hospital Problems   No resolved problems to display.       Anasarca / Ascites due to alcoholic cirrhosis  -paracentesis ordered  -Lasix 40 mg IV q8h  -monitor I&Os and daily weight  -2 g Na diet  -obtain 2D echo     Cirrhosis with alcoholism / Alcohol abuse  -patient reports he quit drinking 2 weeks ago  -continued abstinence encouraged  -obtain liver US  -consult GI  -initiate CIWA protocol with PRN Ativan and monitor for withdrawals  -start daily multivitamin, thiamine, and folic acid     Hyperbilirubinemia-likely secondary to alcoholic cirrhosis  -consult GI  -monitor and trend      Thrombocytopenia-likely secondary to alcoholic cirrhosis  -no active bleeding noted  -monitor and trend Plts     Coagulopathy-likely secondary to alcoholic cirrhosis  -no active bleeding noted  -monitor and trend INR     Macrocytic anemia-likely secondary to alcoholic cirrhosis  -B12 1309 and folate 4  -supplementation as above  -monitor and trend H&H     Prolonged QT interval-etiology unclear, baseline  "unknown  -repeat EKG in am  -continuous cardiac monitoring      Acute UTI (urinary tract infection)  -ceftriaxone>omniceft, follow culture      Hypotension  -Fluid bolus  -Midodrine  -Albumin IV  VTE Prophylaxis -     Hospital Course:  Adam Alvares Jr. is a 35 y.o. male with a history of alcohol abuse who presents to [facility] ED on 06/01/2021 complaining of swelling of his legs and abdomen. He states the swelling started approximately 4 weeks ago and has gotten progressively worse. He reports associated shortness of breath, but denies chest pain, cough, congestion, fever, or chills. He denies any exacerbating or alleviating factors. He states he quit drinking alcohol 2 weeks ago. He does not take any home medications, except an occasional Tylenol. He does not have a PCP. He denies tobacco or illicit drug use. He states for the past 10 years on average he would drink 4 alcoholic beverages 3 times per week, plus he would drink on the weekends. He reports a family history of alcoholism.      Workup in the ER was significant for hgb 11.1, Plt 63, INR 1.41, Na 134, bilirubin 4.8, albumin 2.40, alk phos 173, AST 56, lipase 76. His alcohol level was negative. His EKG showed sinus tachycardia with prolonged QT interval. His urinalysis was abnormal and culture reflexed. His CT abd/pelvis showed \"Advanced cirrhosis with splenomegaly, large volume diffuse ascites and marked anasarca.\" He was admitted for further evaluation and treatment.      Date:  6/2/21: Patient seen and examined in bed no acute distress, complaining of abdominal distention edema, to undergo paracentesis today.  6/3/21 bp low this am.  Give albumin and a fluid bolus.  Midodrine x1  6/4/21 today, will discharge patient home.  Discussed with RN.    Recommendation for Outpatient Providers:   Monitor cbc,cmp  Follow with GI as outpatient     Reasons For Change In Medications and Indications for New " Medications:  Lasix  Lactulose  Spironolactone  Zinc  Folic acid  Thiamine  Day of Discharge     Vital Signs:   Temp:  [97.6 °F (36.4 °C)-98 °F (36.7 °C)] 98 °F (36.7 °C)  Heart Rate:  [106-114] 109  Resp:  [16-18] 16  BP: ()/(49-67) 123/65       Physical Exam  Vitals and nursing note reviewed.   Constitutional:       General: He is not in acute distress.     Appearance: He is well-developed. He is obese. He is not ill-appearing, toxic-appearing or diaphoretic.   HENT:      Head: Normocephalic and atraumatic.      Nose: Nose normal. No congestion or rhinorrhea.      Mouth/Throat:      Mouth: Mucous membranes are moist.      Pharynx: No oropharyngeal exudate.   Eyes:      General: No scleral icterus.        Right eye: No discharge.         Left eye: No discharge.      Extraocular Movements: Extraocular movements intact.      Pupils: Pupils are equal, round, and reactive to light.   Neck:      Thyroid: No thyromegaly.      Vascular: No carotid bruit or JVD.      Trachea: No tracheal deviation.   Cardiovascular:      Rate and Rhythm: Normal rate and regular rhythm.      Pulses: Normal pulses.      Heart sounds: Normal heart sounds. No murmur heard.   No friction rub. No gallop.    Pulmonary:      Effort: Pulmonary effort is normal. No respiratory distress.      Breath sounds: Normal breath sounds. No stridor. No wheezing, rhonchi or rales.   Chest:      Chest wall: No tenderness.   Abdominal:      General: Bowel sounds are normal. There is distension.      Palpations: There is no mass.      Tenderness: There is no abdominal tenderness. There is no guarding or rebound.      Hernia: No hernia is present.   Musculoskeletal:         General: Swelling present. No tenderness, deformity or signs of injury. Normal range of motion.      Cervical back: Normal range of motion and neck supple. No rigidity. No muscular tenderness.      Right lower leg: Edema present.      Left lower leg: Edema present.   Lymphadenopathy:       Cervical: No cervical adenopathy.   Skin:     General: Skin is warm and dry.      Coloration: Skin is not jaundiced or pale.      Findings: No bruising, erythema or rash.   Neurological:      General: No focal deficit present.      Mental Status: He is alert and oriented to person, place, and time. Mental status is at baseline.      Cranial Nerves: No cranial nerve deficit.      Sensory: No sensory deficit.      Motor: No weakness or abnormal muscle tone.      Coordination: Coordination normal.   Psychiatric:         Mood and Affect: Mood normal.         Behavior: Behavior normal.         Thought Content: Thought content normal.         Judgment: Judgment normal.         Pertinent  and/or Most Recent Results     Results from last 7 days   Lab Units 06/04/21  0233 06/03/21  0532 06/02/21  0534 06/01/21  1113   WBC 10*3/mm3  --   --  5.80 6.30   HEMOGLOBIN g/dL  --   --  10.0* 11.1*   HEMATOCRIT %  --   --  28.1* 32.0*   PLATELETS 10*3/mm3  --   --  60* 63*   SODIUM mmol/L  --   --  133* 134*   POTASSIUM mmol/L 4.1 3.9 4.2 4.5   CHLORIDE mmol/L  --   --  103 101   CO2 mmol/L  --   --  21.0* 23.0   BUN mg/dL  --   --  21* 22*   CREATININE mg/dL  --   --  1.27 1.23   GLUCOSE mg/dL  --   --  88 92   CALCIUM mg/dL  --   --  7.8* 8.7     Results from last 7 days   Lab Units 06/02/21  0534 06/01/21  1113   BILIRUBIN mg/dL 4.4* 4.8*   ALK PHOS U/L 144* 173*   ALT (SGPT) U/L 29 36   AST (SGOT) U/L 48* 56*   PROTIME Seconds 16.3* 15.3*   INR  1.51* 1.41*   APTT seconds  --  33.6*           Invalid input(s): TG, LDLCALC, LDLREALC  Results from last 7 days   Lab Units 06/01/21  1113   TSH uIU/mL 4.110   PROCALCITONIN ng/mL 0.11       Brief Urine Lab Results  (Last result in the past 365 days)      Color   Clarity   Blood   Leuk Est   Nitrite   Protein   CREAT   Urine HCG        06/01/21 1219 Red Cloudy Large (3+) Small (1+) Positive 30 mg/dL (1+)               Microbiology Results Abnormal     Procedure Component Value -  Date/Time    Body Fluid Culture - Body Fluid, Peritoneum [[id number]] Collected: 06/02/21 1406    Lab Status: Preliminary result Specimen: Body Fluid from Peritoneum Updated: 06/04/21 0658     Body Fluid Culture No growth at 2 days     Gram Stain Few (2+) WBCs seen      No organisms seen    Anaerobic Culture - Body Fluid, Peritoneum [[id number]] Collected: 06/01/21 1436    Lab Status: Preliminary result Specimen: Body Fluid from Peritoneum Updated: 06/04/21 0655     Anaerobic Culture No anaerobes isolated at 3 days    Body Fluid Culture - Body Fluid, Peritoneum [[id number]] Collected: 06/01/21 1436    Lab Status: Preliminary result Specimen: Body Fluid from Peritoneum Updated: 06/04/21 0655     Body Fluid Culture No growth at 3 days     Gram Stain Few (2+) WBCs seen      No organisms seen    Urine Culture - Urine, Urine, Clean Catch [[id number]]  (Abnormal) Collected: 06/01/21 1219    Lab Status: Final result Specimen: Urine, Clean Catch Updated: 06/02/21 0850     Urine Culture <10,000 CFU/mL Gram Negative Bacilli    Narrative:      Call if further workup needed.            CT Abdomen Pelvis Without Contrast    Result Date: 6/1/2021  Impression: Advanced cirrhosis with splenomegaly, large volume diffuse ascites and marked anasarca.  Electronically Signed By-Rocael Kinsey MD On:6/1/2021 1:23 PM This report was finalized on [id number] by  Rocael Kinsey MD.    US Liver    Result Date: 6/1/2021  Impression:  1. The liver appears cirrhotic. 2. Large amount of ascites. 3. Gallbladder wall thickening could be due to cirrhosis and/or ascites.  Electronically Signed By-Liz Fox MD On:6/1/2021 7:53 PM This report was finalized on [id number] by  Liz Fox MD.    XR Chest 1 View    Result Date: 6/1/2021  Impression: Low lung volumes without acute cardiopulmonary abnormality.  Electronically Signed By-Rocael Kinsey MD On:6/1/2021 12:02 PM This report was finalized on [id number] by  Rocael Kinsey  MD.              Results for orders placed during the hospital encounter of 06/01/21    Adult Transthoracic Echo Complete W/ Cont if Necessary Per Protocol    Interpretation Summary  · Estimated left ventricular EF was in disagreement with the calculated left ventricular EF. Left ventricular ejection fraction appears to be greater than 70%. Left ventricular systolic function is hyperdynamic (EF > 70%).  · Left ventricular diastolic function was normal.  · Mild aortic valve stenosis is present.  · Estimated right ventricular systolic pressure from tricuspid regurgitation is normal (<35 mmHg).  · There is a large left pleural effusion. There is a large right pleural effusion.    Preserved LV and RV size and function, EF 70% normal global and regional wall motion  Normal atrial sizes grossly,  Mildly elevated aortic gradients peak of 25 mean of 13, no regurgitation, nodular echodensity on the right and left coronary cusp margin immobile similar to calcification, valve leaflets otherwise open freely  Trace MR posteriorly directed no stenosis  Trace to mild TR normal PA systolic pressure no stenosis  Pulmonic valve not well seen no stenosis by Doppler imaging  Normal diastolic function by criteria  Bilateral large left and right pleural effusions  Small circumferential pericardial effusion, no evidence of tamponade or increased pericardial pressure seen              Test Results Pending at Discharge  Pending Labs     Order Current Status    Anaerobic Culture - Body Fluid, Peritoneum Preliminary result    Body Fluid Culture - Body Fluid, Peritoneum Preliminary result    Body Fluid Culture - Body Fluid, Peritoneum Preliminary result            Procedures Performed    06/02 1638 Diagnostic Bedside Paracentesis Without  Radiology  06/01 1430 ultrasound guided paracentesis      Consults:   Consults     Date and Time Order Name Status Description    6/1/2021  2:00 PM Inpatient Gastroenterology Consult Completed     6/1/2021   1:11 PM Hospitalist (on-call MD unless specified) Completed         Attestation signed by Paco Skinner MD at 06/02/21 1038   I have reviewed this documentation and agree.  Pleasant 35-year-old man with history of alcohol abuse admitted with weight gain abdominal swelling and leg edema.  He has been drinking 4 whiskeys daily for many years.  3 weeks ago he noticed increasing abdominal swelling and leg swelling.  He reports that his baseline weight is 235 pounds that he now weighs 300.  On exam he is jaundiced.  Abdomen is markedly distended with good bowel sounds.  He is nontender.  He has a large amount of ascites.  He has 3+ leg edema.  White blood count 5.8 hemoglobin 10 platelets 60.  BUN 21 creatinine 1.3.  Bilirubin 4.4.  AST 48 ALT 29 alk phos 144.  INR 1.5.  I am recommending large-volume paracentesis.  Replace albumin per protocol.  Send appropriate fluid studies.  He can be discharged home today after paracentesis.  I have recommended spironolactone 100 mg daily and Lasix 40 mg daily.  2 g sodium diet.  Follow-up in my office in 1 month to reassess his weight and ascites and renal function.  Plan EGD as an outpatient to screen for esophageal varices.  We will see as needed.  Paco Skinner M.D.            Discharge Details        Discharge Medications      New Medications      Instructions Start Date   cefdinir 300 MG capsule  Commonly known as: OMNICEF   300 mg, Oral, 2 Times Daily      folic acid 1 MG tablet  Commonly known as: FOLVITE   1 mg, Oral, Daily      furosemide 40 MG tablet  Commonly known as: LASIX   40 mg, Oral, Daily      lactulose 10 GM/15ML solution  Commonly known as: CHRONULAC   10 g, Oral, Daily      saccharomyces boulardii 250 MG capsule  Commonly known as: Florastor   250 mg, Oral, 2 Times Daily      spironolactone 100 MG tablet  Commonly known as: ALDACTONE   100 mg, Oral, Daily      Thiamine Mononitrate 100 MG tablet   100 mg, Oral, Daily      zinc sulfate 220 (50 Zn) MG  capsule  Commonly known as: ZINCATE   220 mg, Oral, Daily             No Known Allergies      Discharge Disposition:  Home or Self Care    Diet:  Hospital:  Diet Order   Procedures   • Diet Cardiac; 2gm Na+         Discharge Activity:   Activity Instructions     Gradually Increase Activity Until at Pre-Hospitalization Level              CODE STATUS:    Code Status and Medical Interventions:   Ordered at: 06/01/21 1400     Code Status:    CPR     Medical Interventions (Level of Support Prior to Arrest):    Full         Follow-up Appointments  No future appointments.    Additional Instructions for the Follow-ups that You Need to Schedule     Discharge Follow-up with PCP   As directed       Currently Documented PCP:    Provider, No Known    PCP Phone Number:    None     Follow Up Details: 1 week         Discharge Follow-up with Specified Provider: GI; 2 Weeks   As directed      To: GI    Follow Up: 2 Weeks                 Condition on Discharge:      Stable      This patient has been examined wearing appropriate Personal Protective Equipment and discussed with rn. 06/04/21      Electronically signed by Chi Reynolds MD, 06/02/21, 12:31 PM EDT.      Time: I spent  34  minutes on this discharge activity which included face-to-face encounter with the patient/reviewing the data in the system/coordination of the care with the nursing staff as well as consultants/documentation/entering orders.

## 2021-06-04 NOTE — CASE MANAGEMENT/SOCIAL WORK
Case Management Discharge Note      Final Note: Home         Selected Continued Care - Discharged on 6/4/2021 Admission date: 6/1/2021 - Discharge disposition: Home or Self Care                 Final Discharge Disposition Code: 01 - home or self-care

## 2021-06-04 NOTE — CASE MANAGEMENT/SOCIAL WORK
Continued Stay Note  VICTORIANO Gaona     Patient Name: Adam Alvares Jr.  MRN: [id number]  Today's Date: 6/4/2021    Admit Date: 6/1/2021    Discharge Plan     Row Name 06/04/21 1015       Plan    Plan  Anticipate routine home with parents. Meds to Bed enrolled. [facility] PCP Apt: 6/10th @ 1:30pm, added to AVS.    Plan Comments  Potential discharge home today, awaiting MD rounds.        Phone communication or documentation only - no physical contact with patient or family.      Teresa Wren RN

## 2021-06-04 NOTE — DISCHARGE PLACEMENT REQUEST
"Adam Alvares Jr. (35 y.o. Male)     Date of Birth Social Security Number Address Home Phone MRN    1985  [id number] [place] [address] [phone] [id number]    [facility] Marital Status          Congregational Single       Admission Date Admission Type Admitting Provider Attending Provider Department, Room/Bed    21 Emergency Chi Reynolds MD Salcedo, Federico N, MD [facility] 3A MEDICAL INPATIENT, 300/    Discharge Date Discharge Disposition Discharge Destination         Home or Self Care              Attending Provider: Chi Reynolds MD    Allergies: No Known Allergies    Isolation: None   Infection: None   Code Status: CPR    Ht: 185.4 cm (73\")   Wt: 127 kg (280 lb 3.3 oz)    Admission Cmt: None   Principal Problem: Anasarca [R60.1]                 Active Insurance as of 2021     Patient has no active insurance coverage on file for 2021.          Emergency Contacts      (Rel.) Home Phone Work Phone Mobile Phone    DEVORA ALVARES (Mother) [phone] -- [phone]               Discharge Summary      Chi Reyonlds MD at 21 1051                [facility]  DISCHARGE SUMMARY        Prepared For PCP:  Provider, No Known    Patient Name: Adam Alvares Jr.  : 1985  MRN: [id number]      Date of Admission:   2021    Date of Discharge:  2021    Length of stay:  LOS: 3 days     Hospital Course     Presenting Problem:   Anasarca [R60.1]  Cirrhosis with alcoholism (CMS/HCC) [K70.30]  Ascites due to alcoholic cirrhosis (CMS/HCC) [K70.31]      Active Hospital Problems    Diagnosis  POA   • **Anasarca [R60.1]  Yes   • Cirrhosis with alcoholism (CMS/HCC) [K70.30]  Yes   • Hyperbilirubinemia [E80.6]  Yes   • Ascites due to alcoholic cirrhosis (CMS/HCC) [K70.31]  Yes   • Thrombocytopenia (CMS/HCC) [D69.6]  Yes   • Coagulopathy (CMS/HCC) [D68.9]  Yes   • Macrocytic anemia [D53.9]  Yes   • Prolonged " QT interval [R94.31]  Yes   • Acute UTI (urinary tract infection) [N39.0]  Yes   • Alcohol abuse [F10.10]  Yes      Resolved Hospital Problems   No resolved problems to display.       Anasarca / Ascites due to alcoholic cirrhosis  -paracentesis ordered  -Lasix 40 mg IV q8h  -monitor I&Os and daily weight  -2 g Na diet  -obtain 2D echo     Cirrhosis with alcoholism / Alcohol abuse  -patient reports he quit drinking 2 weeks ago  -continued abstinence encouraged  -obtain liver US  -consult GI  -initiate CIWA protocol with PRN Ativan and monitor for withdrawals  -start daily multivitamin, thiamine, and folic acid     Hyperbilirubinemia-likely secondary to alcoholic cirrhosis  -consult GI  -monitor and trend      Thrombocytopenia-likely secondary to alcoholic cirrhosis  -no active bleeding noted  -monitor and trend Plts     Coagulopathy-likely secondary to alcoholic cirrhosis  -no active bleeding noted  -monitor and trend INR     Macrocytic anemia-likely secondary to alcoholic cirrhosis  -B12 1309 and folate 4  -supplementation as above  -monitor and trend H&H     Prolonged QT interval-etiology unclear, baseline unknown  -repeat EKG in am  -continuous cardiac monitoring      Acute UTI (urinary tract infection)  -ceftriaxone>omniceft, follow culture      Hypotension  -Fluid bolus  -Midodrine  -Albumin IV  VTE Prophylaxis -     Hospital Course:  Adam Alvares Jr. is a 35 y.o. male with a history of alcohol abuse who presents to [facility] ED on 06/01/2021 complaining of swelling of his legs and abdomen. He states the swelling started approximately 4 weeks ago and has gotten progressively worse. He reports associated shortness of breath, but denies chest pain, cough, congestion, fever, or chills. He denies any exacerbating or alleviating factors. He states he quit drinking alcohol 2 weeks ago. He does not take any home medications, except an occasional Tylenol. He does not have a PCP. He denies tobacco or illicit  "drug use. He states for the past 10 years on average he would drink 4 alcoholic beverages 3 times per week, plus he would drink on the weekends. He reports a family history of alcoholism.      Workup in the ER was significant for hgb 11.1, Plt 63, INR 1.41, Na 134, bilirubin 4.8, albumin 2.40, alk phos 173, AST 56, lipase 76. His alcohol level was negative. His EKG showed sinus tachycardia with prolonged QT interval. His urinalysis was abnormal and culture reflexed. His CT abd/pelvis showed \"Advanced cirrhosis with splenomegaly, large volume diffuse ascites and marked anasarca.\" He was admitted for further evaluation and treatment.      Date:  6/2/21: Patient seen and examined in bed no acute distress, complaining of abdominal distention edema, to undergo paracentesis today.  6/3/21 bp low this am.  Give albumin and a fluid bolus.  Midodrine x1  6/4/21 today, will discharge patient home.  Discussed with RN.    Recommendation for Outpatient Providers:   Monitor cbc,cmp  Follow with GI as outpatient     Reasons For Change In Medications and Indications for New Medications:  Lasix  Lactulose  Spironolactone  Zinc  Folic acid  Thiamine  Day of Discharge     Vital Signs:   Temp:  [97.6 °F (36.4 °C)-98 °F (36.7 °C)] 98 °F (36.7 °C)  Heart Rate:  [106-114] 109  Resp:  [16-18] 16  BP: ()/(49-67) 123/65       Physical Exam  Vitals and nursing note reviewed.   Constitutional:       General: He is not in acute distress.     Appearance: He is well-developed. He is obese. He is not ill-appearing, toxic-appearing or diaphoretic.   HENT:      Head: Normocephalic and atraumatic.      Nose: Nose normal. No congestion or rhinorrhea.      Mouth/Throat:      Mouth: Mucous membranes are moist.      Pharynx: No oropharyngeal exudate.   Eyes:      General: No scleral icterus.        Right eye: No discharge.         Left eye: No discharge.      Extraocular Movements: Extraocular movements intact.      Pupils: Pupils are equal, round, " and reactive to light.   Neck:      Thyroid: No thyromegaly.      Vascular: No carotid bruit or JVD.      Trachea: No tracheal deviation.   Cardiovascular:      Rate and Rhythm: Normal rate and regular rhythm.      Pulses: Normal pulses.      Heart sounds: Normal heart sounds. No murmur heard.   No friction rub. No gallop.    Pulmonary:      Effort: Pulmonary effort is normal. No respiratory distress.      Breath sounds: Normal breath sounds. No stridor. No wheezing, rhonchi or rales.   Chest:      Chest wall: No tenderness.   Abdominal:      General: Bowel sounds are normal. There is distension.      Palpations: There is no mass.      Tenderness: There is no abdominal tenderness. There is no guarding or rebound.      Hernia: No hernia is present.   Musculoskeletal:         General: Swelling present. No tenderness, deformity or signs of injury. Normal range of motion.      Cervical back: Normal range of motion and neck supple. No rigidity. No muscular tenderness.      Right lower leg: Edema present.      Left lower leg: Edema present.   Lymphadenopathy:      Cervical: No cervical adenopathy.   Skin:     General: Skin is warm and dry.      Coloration: Skin is not jaundiced or pale.      Findings: No bruising, erythema or rash.   Neurological:      General: No focal deficit present.      Mental Status: He is alert and oriented to person, place, and time. Mental status is at baseline.      Cranial Nerves: No cranial nerve deficit.      Sensory: No sensory deficit.      Motor: No weakness or abnormal muscle tone.      Coordination: Coordination normal.   Psychiatric:         Mood and Affect: Mood normal.         Behavior: Behavior normal.         Thought Content: Thought content normal.         Judgment: Judgment normal.         Pertinent  and/or Most Recent Results     Results from last 7 days   Lab Units 06/04/21  0233 06/03/21  0532 06/02/21  0534 06/01/21  1113   WBC 10*3/mm3  --   --  5.80 6.30   HEMOGLOBIN g/dL   --   --  10.0* 11.1*   HEMATOCRIT %  --   --  28.1* 32.0*   PLATELETS 10*3/mm3  --   --  60* 63*   SODIUM mmol/L  --   --  133* 134*   POTASSIUM mmol/L 4.1 3.9 4.2 4.5   CHLORIDE mmol/L  --   --  103 101   CO2 mmol/L  --   --  21.0* 23.0   BUN mg/dL  --   --  21* 22*   CREATININE mg/dL  --   --  1.27 1.23   GLUCOSE mg/dL  --   --  88 92   CALCIUM mg/dL  --   --  7.8* 8.7     Results from last 7 days   Lab Units 06/02/21  0534 06/01/21  1113   BILIRUBIN mg/dL 4.4* 4.8*   ALK PHOS U/L 144* 173*   ALT (SGPT) U/L 29 36   AST (SGOT) U/L 48* 56*   PROTIME Seconds 16.3* 15.3*   INR  1.51* 1.41*   APTT seconds  --  33.6*           Invalid input(s): TG, LDLCALC, LDLREALC  Results from last 7 days   Lab Units 06/01/21  1113   TSH uIU/mL 4.110   PROCALCITONIN ng/mL 0.11       Brief Urine Lab Results  (Last result in the past 365 days)      Color   Clarity   Blood   Leuk Est   Nitrite   Protein   CREAT   Urine HCG        06/01/21 1219 Red Cloudy Large (3+) Small (1+) Positive 30 mg/dL (1+)               Microbiology Results Abnormal     Procedure Component Value - Date/Time    Body Fluid Culture - Body Fluid, Peritoneum [[id number]] Collected: 06/02/21 1406    Lab Status: Preliminary result Specimen: Body Fluid from Peritoneum Updated: 06/04/21 0658     Body Fluid Culture No growth at 2 days     Gram Stain Few (2+) WBCs seen      No organisms seen    Anaerobic Culture - Body Fluid, Peritoneum [[id number]] Collected: 06/01/21 1436    Lab Status: Preliminary result Specimen: Body Fluid from Peritoneum Updated: 06/04/21 0655     Anaerobic Culture No anaerobes isolated at 3 days    Body Fluid Culture - Body Fluid, Peritoneum [[id number]] Collected: 06/01/21 1436    Lab Status: Preliminary result Specimen: Body Fluid from Peritoneum Updated: 06/04/21 0655     Body Fluid Culture No growth at 3 days     Gram Stain Few (2+) WBCs seen      No organisms seen    Urine Culture - Urine, Urine, Clean Catch [[id number]]  (Abnormal) Collected:  06/01/21 1219    Lab Status: Final result Specimen: Urine, Clean Catch Updated: 06/02/21 0850     Urine Culture <10,000 CFU/mL Gram Negative Bacilli    Narrative:      Call if further workup needed.            CT Abdomen Pelvis Without Contrast    Result Date: 6/1/2021  Impression: Advanced cirrhosis with splenomegaly, large volume diffuse ascites and marked anasarca.  Electronically Signed By-Rocael Kinsey MD On:6/1/2021 1:23 PM This report was finalized on [id number] by  Rocael Kinsey MD.    US Liver    Result Date: 6/1/2021  Impression:  1. The liver appears cirrhotic. 2. Large amount of ascites. 3. Gallbladder wall thickening could be due to cirrhosis and/or ascites.  Electronically Signed By-Liz Fox MD On:6/1/2021 7:53 PM This report was finalized on [id number] by  Liz Fox MD.    XR Chest 1 View    Result Date: 6/1/2021  Impression: Low lung volumes without acute cardiopulmonary abnormality.  Electronically Signed By-Rocael Kinsey MD On:6/1/2021 12:02 PM This report was finalized on [id number] by  Rocael Kinsey MD.              Results for orders placed during the hospital encounter of 06/01/21    Adult Transthoracic Echo Complete W/ Cont if Necessary Per Protocol    Interpretation Summary  · Estimated left ventricular EF was in disagreement with the calculated left ventricular EF. Left ventricular ejection fraction appears to be greater than 70%. Left ventricular systolic function is hyperdynamic (EF > 70%).  · Left ventricular diastolic function was normal.  · Mild aortic valve stenosis is present.  · Estimated right ventricular systolic pressure from tricuspid regurgitation is normal (<35 mmHg).  · There is a large left pleural effusion. There is a large right pleural effusion.    Preserved LV and RV size and function, EF 70% normal global and regional wall motion  Normal atrial sizes grossly,  Mildly elevated aortic gradients peak of 25 mean of 13, no regurgitation, nodular  echodensity on the right and left coronary cusp margin immobile similar to calcification, valve leaflets otherwise open freely  Trace MR posteriorly directed no stenosis  Trace to mild TR normal PA systolic pressure no stenosis  Pulmonic valve not well seen no stenosis by Doppler imaging  Normal diastolic function by criteria  Bilateral large left and right pleural effusions  Small circumferential pericardial effusion, no evidence of tamponade or increased pericardial pressure seen              Test Results Pending at Discharge  Pending Labs     Order Current Status    Anaerobic Culture - Body Fluid, Peritoneum Preliminary result    Body Fluid Culture - Body Fluid, Peritoneum Preliminary result    Body Fluid Culture - Body Fluid, Peritoneum Preliminary result            Procedures Performed    06/02 1638 Diagnostic Bedside Paracentesis Without  Radiology  06/01 1430 ultrasound guided paracentesis      Consults:   Consults     Date and Time Order Name Status Description    6/1/2021  2:00 PM Inpatient Gastroenterology Consult Completed     6/1/2021  1:11 PM Hospitalist (on-call MD unless specified) Completed         Attestation signed by Paco Skinner MD at 06/02/21 1038   I have reviewed this documentation and agree.  Pleasant 35-year-old man with history of alcohol abuse admitted with weight gain abdominal swelling and leg edema.  He has been drinking 4 whiskeys daily for many years.  3 weeks ago he noticed increasing abdominal swelling and leg swelling.  He reports that his baseline weight is 235 pounds that he now weighs 300.  On exam he is jaundiced.  Abdomen is markedly distended with good bowel sounds.  He is nontender.  He has a large amount of ascites.  He has 3+ leg edema.  White blood count 5.8 hemoglobin 10 platelets 60.  BUN 21 creatinine 1.3.  Bilirubin 4.4.  AST 48 ALT 29 alk phos 144.  INR 1.5.  I am recommending large-volume paracentesis.  Replace albumin per protocol.  Send appropriate fluid  studies.  He can be discharged home today after paracentesis.  I have recommended spironolactone 100 mg daily and Lasix 40 mg daily.  2 g sodium diet.  Follow-up in my office in 1 month to reassess his weight and ascites and renal function.  Plan EGD as an outpatient to screen for esophageal varices.  We will see as needed.  Paco Skinner M.D.            Discharge Details        Discharge Medications      New Medications      Instructions Start Date   cefdinir 300 MG capsule  Commonly known as: OMNICEF   300 mg, Oral, 2 Times Daily      folic acid 1 MG tablet  Commonly known as: FOLVITE   1 mg, Oral, Daily      furosemide 40 MG tablet  Commonly known as: LASIX   40 mg, Oral, Daily      lactulose 10 GM/15ML solution  Commonly known as: CHRONULAC   10 g, Oral, Daily      saccharomyces boulardii 250 MG capsule  Commonly known as: Florastor   250 mg, Oral, 2 Times Daily      spironolactone 100 MG tablet  Commonly known as: ALDACTONE   100 mg, Oral, Daily      Thiamine Mononitrate 100 MG tablet   100 mg, Oral, Daily      zinc sulfate 220 (50 Zn) MG capsule  Commonly known as: ZINCATE   220 mg, Oral, Daily             No Known Allergies      Discharge Disposition:  Home or Self Care    Diet:  Hospital:  Diet Order   Procedures   • Diet Cardiac; 2gm Na+         Discharge Activity:   Activity Instructions     Gradually Increase Activity Until at Pre-Hospitalization Level              CODE STATUS:    Code Status and Medical Interventions:   Ordered at: 06/01/21 1400     Code Status:    CPR     Medical Interventions (Level of Support Prior to Arrest):    Full         Follow-up Appointments  No future appointments.    Additional Instructions for the Follow-ups that You Need to Schedule     Discharge Follow-up with PCP   As directed       Currently Documented PCP:    Provider, No Known    PCP Phone Number:    None     Follow Up Details: 1 week         Discharge Follow-up with Specified Provider: GI; 2 Weeks   As directed       To: GI    Follow Up: 2 Weeks                 Condition on Discharge:      Stable      This patient has been examined wearing appropriate Personal Protective Equipment and discussed with rn. 06/04/21      Electronically signed by Chi Reynolds MD, 06/02/21, 12:31 PM EDT.      Time: I spent  34  minutes on this discharge activity which included face-to-face encounter with the patient/reviewing the data in the system/coordination of the care with the nursing staff as well as consultants/documentation/entering orders.      Electronically signed by Chi Reynolds MD at 06/04/21 1050       COLE Mars    Phone: [phone]  Cell: [phone]  Fax: [phone]  [email or web address]  lois

## 2021-06-04 NOTE — CASE MANAGEMENT/SOCIAL WORK
Continued Stay Note  VICTORIANO Gaona     Patient Name: Adam Alvares Jr.  MRN: [id number]  Today's Date: 6/4/2021    Admit Date: 6/1/2021    Discharge Plan     Row Name 06/04/21 1226       Plan    Plan Comments  Faxed D/C Summary to [facility] @ fax #: [phone] via ad-hoc communication.     No physical contact with patient on this date.  COLE Mars    Phone: [phone]  Cell: [phone]  Fax: [phone]  [email or web address]

## 2021-06-04 NOTE — PLAN OF CARE
Goal Outcome Evaluation:  Plan of Care Reviewed With: patient  Progress: no change  Outcome Summary: Pt is resting in bed. Pt shows no s/s of distress and vitals are stable. Pt voiced no concerns. Pt has KARTHIK Lower edema +3. Call light within reach. Will continue to monitor.

## 2021-06-05 ENCOUNTER — READMISSION MANAGEMENT (OUTPATIENT)
Dept: CALL CENTER | Facility: HOSPITAL | Age: 36
End: 2021-06-05

## 2021-06-05 NOTE — OUTREACH NOTE
Prep Survey      Responses   [facility] facility patient discharged from?  [facility]   Is LACE score < 7 ?  No   Emergency Room discharge w/ pulse ox?  No   Eligibility  Not Eligible   What are the reasons patient is not eligible?  Other   Does the patient have one of the following disease processes/diagnoses(primary or secondary)?  Other   Prep survey completed?  Yes          Radha Parkinson RN

## 2021-06-06 LAB
BACTERIA FLD CULT: NORMAL
BACTERIA SPEC ANAEROBE CULT: NORMAL
GRAM STN SPEC: NORMAL
GRAM STN SPEC: NORMAL

## 2021-06-07 LAB
BACTERIA FLD CULT: NORMAL
GRAM STN SPEC: NORMAL
GRAM STN SPEC: NORMAL

## 2021-06-08 LAB — REF LAB TEST METHOD: NORMAL

## 2021-07-07 ENCOUNTER — OFFICE (AMBULATORY)
Dept: URBAN - METROPOLITAN AREA CLINIC 64 | Facility: CLINIC | Age: 36
End: 2021-07-07

## 2021-07-07 VITALS
DIASTOLIC BLOOD PRESSURE: 70 MMHG | WEIGHT: 199 LBS | HEIGHT: 74 IN | SYSTOLIC BLOOD PRESSURE: 107 MMHG | HEART RATE: 107 BPM

## 2021-07-07 DIAGNOSIS — K70.31 ALCOHOLIC CIRRHOSIS OF LIVER WITH ASCITES: ICD-10-CM

## 2021-07-07 PROCEDURE — 99213 OFFICE O/P EST LOW 20 MIN: CPT | Performed by: NURSE PRACTITIONER

## 2021-09-28 ENCOUNTER — HOSPITAL ENCOUNTER (INPATIENT)
Facility: HOSPITAL | Age: 36
LOS: 3 days | Discharge: HOME | End: 2021-10-01
Attending: EMERGENCY MEDICINE | Admitting: INTERNAL MEDICINE

## 2021-09-28 DIAGNOSIS — N17.9 AKI (ACUTE KIDNEY INJURY) (HCC): ICD-10-CM

## 2021-09-28 DIAGNOSIS — K70.30 ALCOHOLIC CIRRHOSIS, UNSPECIFIED WHETHER ASCITES PRESENT (HCC): ICD-10-CM

## 2021-09-28 DIAGNOSIS — E87.5 HYPERKALEMIA: Primary | ICD-10-CM

## 2021-09-28 LAB
ALBUMIN SERPL-MCNC: 2.9 G/DL (ref 3.5–5.2)
ALBUMIN/GLOB SERPL: 0.7 G/DL
ALP SERPL-CCNC: 139 U/L (ref 39–117)
ALT SERPL W P-5'-P-CCNC: 36 U/L (ref 1–41)
ANION GAP SERPL CALCULATED.3IONS-SCNC: 10 MMOL/L (ref 5–15)
ANION GAP SERPL CALCULATED.3IONS-SCNC: 8 MMOL/L (ref 5–15)
AST SERPL-CCNC: 47 U/L (ref 1–40)
BASOPHILS # BLD AUTO: 0 10*3/MM3 (ref 0–0.2)
BASOPHILS NFR BLD AUTO: 0.6 % (ref 0–1.5)
BILIRUB SERPL-MCNC: 1.6 MG/DL (ref 0–1.2)
BUN SERPL-MCNC: 33 MG/DL (ref 6–20)
BUN SERPL-MCNC: 36 MG/DL (ref 6–20)
BUN/CREAT SERPL: 16.8 (ref 7–25)
BUN/CREAT SERPL: 17.7 (ref 7–25)
CALCIUM SPEC-SCNC: 7.9 MG/DL (ref 8.6–10.5)
CALCIUM SPEC-SCNC: 8.1 MG/DL (ref 8.6–10.5)
CHLORIDE SERPL-SCNC: 105 MMOL/L (ref 98–107)
CHLORIDE SERPL-SCNC: 108 MMOL/L (ref 98–107)
CO2 SERPL-SCNC: 18 MMOL/L (ref 22–29)
CO2 SERPL-SCNC: 19 MMOL/L (ref 22–29)
CREAT SERPL-MCNC: 1.86 MG/DL (ref 0.76–1.27)
CREAT SERPL-MCNC: 2.14 MG/DL (ref 0.76–1.27)
DEPRECATED RDW RBC AUTO: 51.6 FL (ref 37–54)
EOSINOPHIL # BLD AUTO: 0.2 10*3/MM3 (ref 0–0.4)
EOSINOPHIL NFR BLD AUTO: 3.7 % (ref 0.3–6.2)
ERYTHROCYTE [DISTWIDTH] IN BLOOD BY AUTOMATED COUNT: 15.2 % (ref 12.3–15.4)
GFR SERPL CREATININE-BSD FRML MDRD: 36 ML/MIN/1.73
GFR SERPL CREATININE-BSD FRML MDRD: 42 ML/MIN/1.73
GLOBULIN UR ELPH-MCNC: 4.4 GM/DL
GLUCOSE SERPL-MCNC: 118 MG/DL (ref 65–99)
GLUCOSE SERPL-MCNC: 81 MG/DL (ref 65–99)
HCT VFR BLD AUTO: 27.5 % (ref 37.5–51)
HGB BLD-MCNC: 9.6 G/DL (ref 13–17.7)
HOLD SPECIMEN: NORMAL
LYMPHOCYTES # BLD AUTO: 0.5 10*3/MM3 (ref 0.7–3.1)
LYMPHOCYTES NFR BLD AUTO: 12.6 % (ref 19.6–45.3)
MCH RBC QN AUTO: 34.5 PG (ref 26.6–33)
MCHC RBC AUTO-ENTMCNC: 34.9 G/DL (ref 31.5–35.7)
MCV RBC AUTO: 98.7 FL (ref 79–97)
MONOCYTES # BLD AUTO: 0.5 10*3/MM3 (ref 0.1–0.9)
MONOCYTES NFR BLD AUTO: 11.5 % (ref 5–12)
NEUTROPHILS NFR BLD AUTO: 2.9 10*3/MM3 (ref 1.7–7)
NEUTROPHILS NFR BLD AUTO: 71.6 % (ref 42.7–76)
NRBC BLD AUTO-RTO: 0.1 /100 WBC (ref 0–0.2)
PLATELET # BLD AUTO: 56 10*3/MM3 (ref 140–450)
PMV BLD AUTO: 7.3 FL (ref 6–12)
POTASSIUM SERPL-SCNC: 5.6 MMOL/L (ref 3.5–5.2)
POTASSIUM SERPL-SCNC: 5.8 MMOL/L (ref 3.5–5.2)
PROT SERPL-MCNC: 7.3 G/DL (ref 6–8.5)
RBC # BLD AUTO: 2.79 10*6/MM3 (ref 4.14–5.8)
SARS-COV-2 RNA PNL SPEC NAA+PROBE: NOT DETECTED
SODIUM SERPL-SCNC: 133 MMOL/L (ref 136–145)
SODIUM SERPL-SCNC: 135 MMOL/L (ref 136–145)
TROPONIN T SERPL-MCNC: 0.03 NG/ML (ref 0–0.03)
WBC # BLD AUTO: 4.1 10*3/MM3 (ref 3.4–10.8)
WHOLE BLOOD HOLD SPECIMEN: NORMAL

## 2021-09-28 PROCEDURE — 63710000001 INSULIN REGULAR HUMAN PER 5 UNITS: Performed by: PHYSICIAN ASSISTANT

## 2021-09-28 PROCEDURE — 85025 COMPLETE CBC W/AUTO DIFF WBC: CPT | Performed by: PHYSICIAN ASSISTANT

## 2021-09-28 PROCEDURE — G0378 HOSPITAL OBSERVATION PER HR: HCPCS

## 2021-09-28 PROCEDURE — 87635 SARS-COV-2 COVID-19 AMP PRB: CPT | Performed by: EMERGENCY MEDICINE

## 2021-09-28 PROCEDURE — 36415 COLL VENOUS BLD VENIPUNCTURE: CPT | Performed by: PHYSICIAN ASSISTANT

## 2021-09-28 PROCEDURE — 93005 ELECTROCARDIOGRAM TRACING: CPT | Performed by: EMERGENCY MEDICINE

## 2021-09-28 PROCEDURE — 80053 COMPREHEN METABOLIC PANEL: CPT | Performed by: PHYSICIAN ASSISTANT

## 2021-09-28 PROCEDURE — 80074 ACUTE HEPATITIS PANEL: CPT | Performed by: NURSE PRACTITIONER

## 2021-09-28 PROCEDURE — 93005 ELECTROCARDIOGRAM TRACING: CPT

## 2021-09-28 PROCEDURE — 99284 EMERGENCY DEPT VISIT MOD MDM: CPT

## 2021-09-28 PROCEDURE — 84484 ASSAY OF TROPONIN QUANT: CPT | Performed by: PHYSICIAN ASSISTANT

## 2021-09-28 PROCEDURE — 80048 BASIC METABOLIC PNL TOTAL CA: CPT | Performed by: PHYSICIAN ASSISTANT

## 2021-09-28 PROCEDURE — 25010000002 CALCIUM GLUCONATE-NACL 1-0.675 GM/50ML-% SOLUTION: Performed by: PHYSICIAN ASSISTANT

## 2021-09-28 RX ORDER — ZINC SULFATE 50(220)MG
220 CAPSULE ORAL DAILY
Status: DISCONTINUED | OUTPATIENT
Start: 2021-09-29 | End: 2021-10-01 | Stop reason: HOSPADM

## 2021-09-28 RX ORDER — SODIUM CHLORIDE 0.9 % (FLUSH) 0.9 %
10 SYRINGE (ML) INJECTION AS NEEDED
Status: DISCONTINUED | OUTPATIENT
Start: 2021-09-28 | End: 2021-10-01 | Stop reason: HOSPADM

## 2021-09-28 RX ORDER — ONDANSETRON 2 MG/ML
4 INJECTION INTRAMUSCULAR; INTRAVENOUS EVERY 6 HOURS PRN
Status: DISCONTINUED | OUTPATIENT
Start: 2021-09-28 | End: 2021-10-01 | Stop reason: HOSPADM

## 2021-09-28 RX ORDER — FOLIC ACID 1 MG/1
1 TABLET ORAL DAILY
Status: DISCONTINUED | OUTPATIENT
Start: 2021-09-29 | End: 2021-10-01 | Stop reason: HOSPADM

## 2021-09-28 RX ORDER — SACCHAROMYCES BOULARDII 250 MG
250 CAPSULE ORAL 2 TIMES DAILY
Status: DISCONTINUED | OUTPATIENT
Start: 2021-09-28 | End: 2021-10-01 | Stop reason: HOSPADM

## 2021-09-28 RX ORDER — CALCIUM GLUCONATE 20 MG/ML
1 INJECTION, SOLUTION INTRAVENOUS ONCE
Status: COMPLETED | OUTPATIENT
Start: 2021-09-28 | End: 2021-09-28

## 2021-09-28 RX ORDER — SODIUM CHLORIDE 9 MG/ML
125 INJECTION, SOLUTION INTRAVENOUS CONTINUOUS
Status: DISCONTINUED | OUTPATIENT
Start: 2021-09-28 | End: 2021-09-29

## 2021-09-28 RX ORDER — SODIUM CHLORIDE 0.9 % (FLUSH) 0.9 %
10 SYRINGE (ML) INJECTION EVERY 12 HOURS SCHEDULED
Status: DISCONTINUED | OUTPATIENT
Start: 2021-09-28 | End: 2021-10-01 | Stop reason: HOSPADM

## 2021-09-28 RX ORDER — DEXTROSE MONOHYDRATE 25 G/50ML
50 INJECTION, SOLUTION INTRAVENOUS ONCE
Status: COMPLETED | OUTPATIENT
Start: 2021-09-28 | End: 2021-09-28

## 2021-09-28 RX ORDER — LACTULOSE 10 G/15ML
10 SOLUTION ORAL DAILY
Status: DISCONTINUED | OUTPATIENT
Start: 2021-09-29 | End: 2021-10-01 | Stop reason: HOSPADM

## 2021-09-28 RX ADMIN — Medication 250 MG: at 20:41

## 2021-09-28 RX ADMIN — CALCIUM GLUCONATE 1 G: 20 INJECTION, SOLUTION INTRAVENOUS at 16:56

## 2021-09-28 RX ADMIN — DEXTROSE MONOHYDRATE 50 ML: 500 INJECTION PARENTERAL at 16:55

## 2021-09-28 RX ADMIN — SODIUM CHLORIDE 1000 ML: 9 INJECTION, SOLUTION INTRAVENOUS at 17:21

## 2021-09-28 RX ADMIN — SODIUM CHLORIDE 1000 ML: 9 INJECTION, SOLUTION INTRAVENOUS at 16:56

## 2021-09-28 RX ADMIN — SODIUM CHLORIDE 125 ML/HR: 9 INJECTION, SOLUTION INTRAVENOUS at 20:41

## 2021-09-28 RX ADMIN — INSULIN HUMAN 10 UNITS: 100 INJECTION, SOLUTION PARENTERAL at 17:01

## 2021-09-28 RX ADMIN — Medication 10 ML: at 20:45

## 2021-09-29 ENCOUNTER — APPOINTMENT (OUTPATIENT)
Dept: ULTRASOUND IMAGING | Facility: HOSPITAL | Age: 36
End: 2021-09-29

## 2021-09-29 ENCOUNTER — ON CAMPUS - OUTPATIENT (AMBULATORY)
Dept: URBAN - METROPOLITAN AREA HOSPITAL 85 | Facility: HOSPITAL | Age: 36
End: 2021-09-29

## 2021-09-29 DIAGNOSIS — K70.31 ALCOHOLIC CIRRHOSIS OF LIVER WITH ASCITES: ICD-10-CM

## 2021-09-29 DIAGNOSIS — D53.9 NUTRITIONAL ANEMIA, UNSPECIFIED: ICD-10-CM

## 2021-09-29 DIAGNOSIS — E87.5 HYPERKALEMIA: ICD-10-CM

## 2021-09-29 DIAGNOSIS — N17.9 ACUTE KIDNEY FAILURE, UNSPECIFIED: ICD-10-CM

## 2021-09-29 DIAGNOSIS — E87.1 HYPO-OSMOLALITY AND HYPONATREMIA: ICD-10-CM

## 2021-09-29 LAB
ALBUMIN SERPL-MCNC: 2.4 G/DL (ref 3.5–5.2)
ALP SERPL-CCNC: 150 U/L (ref 39–117)
ALPHA1 GLOB MFR UR ELPH: 132 MG/DL (ref 90–200)
ALT SERPL W P-5'-P-CCNC: 32 U/L (ref 1–41)
ANION GAP SERPL CALCULATED.3IONS-SCNC: 7 MMOL/L (ref 5–15)
ANION GAP SERPL CALCULATED.3IONS-SCNC: 9 MMOL/L (ref 5–15)
ANISOCYTOSIS BLD QL: NORMAL
AST SERPL-CCNC: 49 U/L (ref 1–40)
BASOPHILS # BLD AUTO: 0 10*3/MM3 (ref 0–0.2)
BASOPHILS NFR BLD AUTO: 0.3 % (ref 0–1.5)
BILIRUB CONJ SERPL-MCNC: 0.7 MG/DL (ref 0–0.3)
BILIRUB INDIRECT SERPL-MCNC: 0.7 MG/DL
BILIRUB SERPL-MCNC: 1.4 MG/DL (ref 0–1.2)
BUN SERPL-MCNC: 30 MG/DL (ref 6–20)
BUN SERPL-MCNC: 32 MG/DL (ref 6–20)
BUN/CREAT SERPL: 17.9 (ref 7–25)
BUN/CREAT SERPL: 18.9 (ref 7–25)
CALCIUM SPEC-SCNC: 7.7 MG/DL (ref 8.6–10.5)
CALCIUM SPEC-SCNC: 8 MG/DL (ref 8.6–10.5)
CERULOPLASMIN SERPL-MCNC: 14 MG/DL (ref 16–31)
CHLORIDE SERPL-SCNC: 106 MMOL/L (ref 98–107)
CHLORIDE SERPL-SCNC: 108 MMOL/L (ref 98–107)
CO2 SERPL-SCNC: 17 MMOL/L (ref 22–29)
CO2 SERPL-SCNC: 19 MMOL/L (ref 22–29)
CREAT SERPL-MCNC: 1.68 MG/DL (ref 0.76–1.27)
CREAT SERPL-MCNC: 1.69 MG/DL (ref 0.76–1.27)
DEPRECATED RDW RBC AUTO: 53.8 FL (ref 37–54)
EOSINOPHIL # BLD AUTO: 0.2 10*3/MM3 (ref 0–0.4)
EOSINOPHIL NFR BLD AUTO: 5.2 % (ref 0.3–6.2)
ERYTHROCYTE [DISTWIDTH] IN BLOOD BY AUTOMATED COUNT: 15.6 % (ref 12.3–15.4)
GFR SERPL CREATININE-BSD FRML MDRD: 47 ML/MIN/1.73
GFR SERPL CREATININE-BSD FRML MDRD: 47 ML/MIN/1.73
GLUCOSE SERPL-MCNC: 118 MG/DL (ref 65–99)
GLUCOSE SERPL-MCNC: 87 MG/DL (ref 65–99)
HAV IGM SERPL QL IA: NORMAL
HBV CORE IGM SERPL QL IA: NORMAL
HBV SURFACE AG SERPL QL IA: NORMAL
HCT VFR BLD AUTO: 26.2 % (ref 37.5–51)
HCV AB SER DONR QL: NORMAL
HGB BLD-MCNC: 9 G/DL (ref 13–17.7)
INR PPP: 1.33 (ref 0.93–1.1)
IRON 24H UR-MRATE: 91 MCG/DL (ref 59–158)
LYMPHOCYTES # BLD AUTO: 0.5 10*3/MM3 (ref 0.7–3.1)
LYMPHOCYTES NFR BLD AUTO: 13.5 % (ref 19.6–45.3)
MACROCYTES BLD QL SMEAR: NORMAL
MCH RBC QN AUTO: 34.4 PG (ref 26.6–33)
MCHC RBC AUTO-ENTMCNC: 34.4 G/DL (ref 31.5–35.7)
MCV RBC AUTO: 99.9 FL (ref 79–97)
MONOCYTES # BLD AUTO: 0.6 10*3/MM3 (ref 0.1–0.9)
MONOCYTES NFR BLD AUTO: 16.1 % (ref 5–12)
NEUTROPHILS NFR BLD AUTO: 2.4 10*3/MM3 (ref 1.7–7)
NEUTROPHILS NFR BLD AUTO: 64.9 % (ref 42.7–76)
NRBC BLD AUTO-RTO: 0.1 /100 WBC (ref 0–0.2)
PLATELET # BLD AUTO: 47 10*3/MM3 (ref 140–450)
PMV BLD AUTO: 7.2 FL (ref 6–12)
POIKILOCYTOSIS BLD QL SMEAR: NORMAL
POTASSIUM SERPL-SCNC: 5.4 MMOL/L (ref 3.5–5.2)
POTASSIUM SERPL-SCNC: 5.5 MMOL/L (ref 3.5–5.2)
PROT SERPL-MCNC: 6.5 G/DL (ref 6–8.5)
PROT SERPL-MCNC: 7.2 G/DL (ref 6–8.5)
PROTHROMBIN TIME: 14.5 SECONDS (ref 9.6–11.7)
RBC # BLD AUTO: 2.62 10*6/MM3 (ref 4.14–5.8)
SMALL PLATELETS BLD QL SMEAR: NORMAL
SODIUM SERPL-SCNC: 132 MMOL/L (ref 136–145)
SODIUM SERPL-SCNC: 134 MMOL/L (ref 136–145)
SODIUM UR-SCNC: 107 MMOL/L
WBC # BLD AUTO: 3.7 10*3/MM3 (ref 3.4–10.8)
WBC MORPH BLD: NORMAL

## 2021-09-29 PROCEDURE — 86038 ANTINUCLEAR ANTIBODIES: CPT | Performed by: NURSE PRACTITIONER

## 2021-09-29 PROCEDURE — G0378 HOSPITAL OBSERVATION PER HR: HCPCS

## 2021-09-29 PROCEDURE — 76705 ECHO EXAM OF ABDOMEN: CPT

## 2021-09-29 PROCEDURE — 82103 ALPHA-1-ANTITRYPSIN TOTAL: CPT | Performed by: NURSE PRACTITIONER

## 2021-09-29 PROCEDURE — 25010000002 ALBUMIN HUMAN 25% PER 50 ML: Performed by: NURSE PRACTITIONER

## 2021-09-29 PROCEDURE — 86376 MICROSOMAL ANTIBODY EACH: CPT | Performed by: NURSE PRACTITIONER

## 2021-09-29 PROCEDURE — 84155 ASSAY OF PROTEIN SERUM: CPT | Performed by: INTERNAL MEDICINE

## 2021-09-29 PROCEDURE — 83540 ASSAY OF IRON: CPT | Performed by: NURSE PRACTITIONER

## 2021-09-29 PROCEDURE — 80048 BASIC METABOLIC PNL TOTAL CA: CPT | Performed by: INTERNAL MEDICINE

## 2021-09-29 PROCEDURE — 84300 ASSAY OF URINE SODIUM: CPT | Performed by: INTERNAL MEDICINE

## 2021-09-29 PROCEDURE — 80076 HEPATIC FUNCTION PANEL: CPT | Performed by: PHYSICIAN ASSISTANT

## 2021-09-29 PROCEDURE — 76942 ECHO GUIDE FOR BIOPSY: CPT

## 2021-09-29 PROCEDURE — 80048 BASIC METABOLIC PNL TOTAL CA: CPT | Performed by: NURSE PRACTITIONER

## 2021-09-29 PROCEDURE — 82390 ASSAY OF CERULOPLASMIN: CPT | Performed by: NURSE PRACTITIONER

## 2021-09-29 PROCEDURE — 82105 ALPHA-FETOPROTEIN SERUM: CPT | Performed by: NURSE PRACTITIONER

## 2021-09-29 PROCEDURE — 83516 IMMUNOASSAY NONANTIBODY: CPT | Performed by: NURSE PRACTITIONER

## 2021-09-29 PROCEDURE — 85007 BL SMEAR W/DIFF WBC COUNT: CPT | Performed by: NURSE PRACTITIONER

## 2021-09-29 PROCEDURE — 85025 COMPLETE CBC W/AUTO DIFF WBC: CPT | Performed by: NURSE PRACTITIONER

## 2021-09-29 PROCEDURE — P9047 ALBUMIN (HUMAN), 25%, 50ML: HCPCS | Performed by: NURSE PRACTITIONER

## 2021-09-29 PROCEDURE — 85610 PROTHROMBIN TIME: CPT | Performed by: NURSE PRACTITIONER

## 2021-09-29 PROCEDURE — 99254 IP/OBS CNSLTJ NEW/EST MOD 60: CPT | Performed by: NURSE PRACTITIONER

## 2021-09-29 RX ORDER — CEFDINIR 300 MG/1
300 CAPSULE ORAL 2 TIMES DAILY
Status: DISCONTINUED | OUTPATIENT
Start: 2021-09-29 | End: 2021-09-30

## 2021-09-29 RX ORDER — ALBUMIN (HUMAN) 12.5 G/50ML
50 SOLUTION INTRAVENOUS ONCE
Status: COMPLETED | OUTPATIENT
Start: 2021-09-29 | End: 2021-09-29

## 2021-09-29 RX ORDER — SODIUM BICARBONATE 650 MG/1
650 TABLET ORAL 2 TIMES DAILY
Status: DISCONTINUED | OUTPATIENT
Start: 2021-09-29 | End: 2021-10-01 | Stop reason: HOSPADM

## 2021-09-29 RX ADMIN — CEFDINIR 300 MG: 300 CAPSULE ORAL at 16:03

## 2021-09-29 RX ADMIN — Medication 100 MG: at 16:03

## 2021-09-29 RX ADMIN — SODIUM BICARBONATE 650 MG TABLET 650 MG: at 16:03

## 2021-09-29 RX ADMIN — LACTULOSE 10 G: 20 SOLUTION ORAL at 16:04

## 2021-09-29 RX ADMIN — Medication 220 MG: at 16:04

## 2021-09-29 RX ADMIN — Medication 10 ML: at 16:04

## 2021-09-29 RX ADMIN — CEFDINIR 300 MG: 300 CAPSULE ORAL at 20:48

## 2021-09-29 RX ADMIN — ALBUMIN HUMAN 50 G: 0.25 SOLUTION INTRAVENOUS at 16:03

## 2021-09-29 RX ADMIN — Medication 250 MG: at 20:48

## 2021-09-29 RX ADMIN — Medication 250 MG: at 16:04

## 2021-09-29 RX ADMIN — FOLIC ACID 1 MG: 1 TABLET ORAL at 16:04

## 2021-09-29 RX ADMIN — Medication 10 ML: at 20:48

## 2021-09-30 ENCOUNTER — TELEPHONE (OUTPATIENT)
Dept: TELEMETRY | Facility: HOSPITAL | Age: 36
End: 2021-09-30

## 2021-09-30 ENCOUNTER — TELEPHONE (OUTPATIENT)
Dept: HOSPITALIST | Facility: CLINIC | Age: 36
End: 2021-09-30

## 2021-09-30 ENCOUNTER — INPATIENT HOSPITAL (AMBULATORY)
Dept: URBAN - METROPOLITAN AREA HOSPITAL 84 | Facility: HOSPITAL | Age: 36
End: 2021-09-30

## 2021-09-30 DIAGNOSIS — F10.11 ALCOHOL ABUSE, IN REMISSION: ICD-10-CM

## 2021-09-30 DIAGNOSIS — K70.31 ALCOHOLIC CIRRHOSIS OF LIVER WITH ASCITES: ICD-10-CM

## 2021-09-30 DIAGNOSIS — E87.5 HYPERKALEMIA: ICD-10-CM

## 2021-09-30 DIAGNOSIS — N17.9 ACUTE KIDNEY FAILURE, UNSPECIFIED: ICD-10-CM

## 2021-09-30 DIAGNOSIS — E87.1 HYPO-OSMOLALITY AND HYPONATREMIA: ICD-10-CM

## 2021-09-30 DIAGNOSIS — D53.9 NUTRITIONAL ANEMIA, UNSPECIFIED: ICD-10-CM

## 2021-09-30 PROBLEM — E44.0 MODERATE MALNUTRITION: Status: ACTIVE | Noted: 2021-09-30

## 2021-09-30 LAB
ACTIN IGG SERPL-ACNC: 19 UNITS (ref 0–19)
ALBUMIN FLD-MCNC: <0.4 G/DL
ALBUMIN SERPL-MCNC: 3.1 G/DL (ref 3.5–5.2)
ALBUMIN/GLOB SERPL: 0.9 G/DL
ALP SERPL-CCNC: 113 U/L (ref 39–117)
ALPHA-FETOPROTEIN: 2.93 NG/ML (ref 0–8.3)
ALT SERPL W P-5'-P-CCNC: 33 U/L (ref 1–41)
ANION GAP SERPL CALCULATED.3IONS-SCNC: 9 MMOL/L (ref 5–15)
ANISOCYTOSIS BLD QL: NORMAL
APPEARANCE FLD: CLEAR
AST SERPL-CCNC: 49 U/L (ref 1–40)
BASOPHILS # BLD AUTO: 0 10*3/MM3 (ref 0–0.2)
BASOPHILS NFR BLD AUTO: 0.4 % (ref 0–1.5)
BILIRUB SERPL-MCNC: 1.7 MG/DL (ref 0–1.2)
BUN SERPL-MCNC: 28 MG/DL (ref 6–20)
BUN/CREAT SERPL: 17.7 (ref 7–25)
CALCIUM SPEC-SCNC: 8.2 MG/DL (ref 8.6–10.5)
CHLORIDE SERPL-SCNC: 108 MMOL/L (ref 98–107)
CO2 SERPL-SCNC: 17 MMOL/L (ref 22–29)
COLOR FLD: YELLOW
CREAT SERPL-MCNC: 1.58 MG/DL (ref 0.76–1.27)
DEPRECATED RDW RBC AUTO: 52.5 FL (ref 37–54)
EOSINOPHIL # BLD AUTO: 0.2 10*3/MM3 (ref 0–0.4)
EOSINOPHIL NFR BLD AUTO: 5.8 % (ref 0.3–6.2)
ERYTHROCYTE [DISTWIDTH] IN BLOOD BY AUTOMATED COUNT: 15.4 % (ref 12.3–15.4)
GFR SERPL CREATININE-BSD FRML MDRD: 50 ML/MIN/1.73
GLOBULIN UR ELPH-MCNC: 3.4 GM/DL
GLUCOSE SERPL-MCNC: 82 MG/DL (ref 65–99)
HCT VFR BLD AUTO: 25.5 % (ref 37.5–51)
HGB BLD-MCNC: 8.7 G/DL (ref 13–17.7)
INR PPP: 1.37 (ref 0.93–1.1)
LKM-1 AB SER-ACNC: 1.3 UNITS (ref 0–20)
LYMPHOCYTES # BLD AUTO: 0.5 10*3/MM3 (ref 0.7–3.1)
LYMPHOCYTES NFR BLD AUTO: 17.6 % (ref 19.6–45.3)
LYMPHOCYTES NFR FLD MANUAL: 78 %
MCH RBC QN AUTO: 33.9 PG (ref 26.6–33)
MCHC RBC AUTO-ENTMCNC: 34.1 G/DL (ref 31.5–35.7)
MCV RBC AUTO: 99.2 FL (ref 79–97)
MESOTHL CELL NFR FLD MANUAL: 18 %
METHOD: NORMAL
MITOCHONDRIA M2 IGG SER-ACNC: <20 UNITS (ref 0–20)
MONOCYTES # BLD AUTO: 0.4 10*3/MM3 (ref 0.1–0.9)
MONOCYTES NFR BLD AUTO: 15.1 % (ref 5–12)
MONOCYTES NFR FLD: 2 %
NEUTROPHILS NFR BLD AUTO: 1.8 10*3/MM3 (ref 1.7–7)
NEUTROPHILS NFR BLD AUTO: 61.1 % (ref 42.7–76)
NEUTROPHILS NFR FLD MANUAL: 2 %
NRBC BLD AUTO-RTO: 0 /100 WBC (ref 0–0.2)
NUC CELL # FLD: 104 /MM3
PLATELET # BLD AUTO: 43 10*3/MM3 (ref 140–450)
PMV BLD AUTO: 7.4 FL (ref 6–12)
POTASSIUM SERPL-SCNC: 5.4 MMOL/L (ref 3.5–5.2)
PROT SERPL-MCNC: 6.5 G/DL (ref 6–8.5)
PROTHROMBIN TIME: 14.9 SECONDS (ref 9.6–11.7)
QT INTERVAL: 374 MS
RBC # BLD AUTO: 2.57 10*6/MM3 (ref 4.14–5.8)
SMALL PLATELETS BLD QL SMEAR: NORMAL
SODIUM SERPL-SCNC: 134 MMOL/L (ref 136–145)
WBC # BLD AUTO: 2.9 10*3/MM3 (ref 3.4–10.8)
WBC MORPH BLD: NORMAL

## 2021-09-30 PROCEDURE — 85025 COMPLETE CBC W/AUTO DIFF WBC: CPT | Performed by: NURSE PRACTITIONER

## 2021-09-30 PROCEDURE — 0W9G3ZZ DRAINAGE OF PERITONEAL CAVITY, PERCUTANEOUS APPROACH: ICD-10-PCS | Performed by: INTERNAL MEDICINE

## 2021-09-30 PROCEDURE — 99232 SBSQ HOSP IP/OBS MODERATE 35: CPT | Performed by: NURSE PRACTITIONER

## 2021-09-30 PROCEDURE — 85007 BL SMEAR W/DIFF WBC COUNT: CPT | Performed by: NURSE PRACTITIONER

## 2021-09-30 PROCEDURE — 85610 PROTHROMBIN TIME: CPT | Performed by: NURSE PRACTITIONER

## 2021-09-30 PROCEDURE — 76942 ECHO GUIDE FOR BIOPSY: CPT

## 2021-09-30 PROCEDURE — 87070 CULTURE OTHR SPECIMN AEROBIC: CPT | Performed by: INTERNAL MEDICINE

## 2021-09-30 PROCEDURE — 89051 BODY FLUID CELL COUNT: CPT | Performed by: INTERNAL MEDICINE

## 2021-09-30 PROCEDURE — 87205 SMEAR GRAM STAIN: CPT | Performed by: INTERNAL MEDICINE

## 2021-09-30 PROCEDURE — 80053 COMPREHEN METABOLIC PANEL: CPT | Performed by: NURSE PRACTITIONER

## 2021-09-30 PROCEDURE — 49083 ABD PARACENTESIS W/IMAGING: CPT | Performed by: INTERNAL MEDICINE

## 2021-09-30 PROCEDURE — 82042 OTHER SOURCE ALBUMIN QUAN EA: CPT | Performed by: INTERNAL MEDICINE

## 2021-09-30 RX ADMIN — SODIUM BICARBONATE 650 MG TABLET 650 MG: at 11:44

## 2021-09-30 RX ADMIN — FOLIC ACID 1 MG: 1 TABLET ORAL at 11:44

## 2021-09-30 RX ADMIN — SODIUM BICARBONATE 650 MG TABLET 650 MG: at 21:44

## 2021-09-30 RX ADMIN — SODIUM BICARBONATE: 84 INJECTION, SOLUTION INTRAVENOUS at 13:55

## 2021-09-30 RX ADMIN — Medication 100 MG: at 11:44

## 2021-09-30 RX ADMIN — Medication 250 MG: at 21:44

## 2021-09-30 RX ADMIN — CEFDINIR 300 MG: 300 CAPSULE ORAL at 11:44

## 2021-09-30 RX ADMIN — Medication 220 MG: at 11:44

## 2021-09-30 RX ADMIN — Medication 10 ML: at 11:44

## 2021-09-30 RX ADMIN — LACTULOSE 10 G: 20 SOLUTION ORAL at 11:44

## 2021-09-30 RX ADMIN — Medication 10 ML: at 21:44

## 2021-09-30 RX ADMIN — Medication 250 MG: at 11:44

## 2021-10-01 ENCOUNTER — INPATIENT HOSPITAL (AMBULATORY)
Dept: URBAN - METROPOLITAN AREA HOSPITAL 84 | Facility: HOSPITAL | Age: 36
End: 2021-10-01

## 2021-10-01 VITALS
TEMPERATURE: 97.5 F | SYSTOLIC BLOOD PRESSURE: 124 MMHG | BODY MASS INDEX: 22.63 KG/M2 | HEART RATE: 94 BPM | DIASTOLIC BLOOD PRESSURE: 73 MMHG | OXYGEN SATURATION: 100 % | HEIGHT: 74 IN | RESPIRATION RATE: 16 BRPM | WEIGHT: 176.37 LBS

## 2021-10-01 DIAGNOSIS — E87.1 HYPO-OSMOLALITY AND HYPONATREMIA: ICD-10-CM

## 2021-10-01 DIAGNOSIS — D53.9 NUTRITIONAL ANEMIA, UNSPECIFIED: ICD-10-CM

## 2021-10-01 DIAGNOSIS — F10.11 ALCOHOL ABUSE, IN REMISSION: ICD-10-CM

## 2021-10-01 DIAGNOSIS — N17.9 ACUTE KIDNEY FAILURE, UNSPECIFIED: ICD-10-CM

## 2021-10-01 DIAGNOSIS — E87.5 HYPERKALEMIA: ICD-10-CM

## 2021-10-01 DIAGNOSIS — K70.31 ALCOHOLIC CIRRHOSIS OF LIVER WITH ASCITES: ICD-10-CM

## 2021-10-01 LAB
ALBUMIN SERPL-MCNC: 2.8 G/DL (ref 3.5–5.2)
ALBUMIN/GLOB SERPL: 0.7 G/DL
ALP SERPL-CCNC: 117 U/L (ref 39–117)
ALT SERPL W P-5'-P-CCNC: 33 U/L (ref 1–41)
ANA SER QL: NEGATIVE
ANION GAP SERPL CALCULATED.3IONS-SCNC: 8 MMOL/L (ref 5–15)
ANION GAP SERPL CALCULATED.3IONS-SCNC: 9 MMOL/L (ref 5–15)
AST SERPL-CCNC: 48 U/L (ref 1–40)
BASOPHILS # BLD AUTO: 0 10*3/MM3 (ref 0–0.2)
BASOPHILS NFR BLD AUTO: 0.6 % (ref 0–1.5)
BILIRUB SERPL-MCNC: 1.6 MG/DL (ref 0–1.2)
BUN SERPL-MCNC: 25 MG/DL (ref 6–20)
BUN SERPL-MCNC: 27 MG/DL (ref 6–20)
BUN/CREAT SERPL: 16.4 (ref 7–25)
BUN/CREAT SERPL: 18.2 (ref 7–25)
CALCIUM SPEC-SCNC: 7.8 MG/DL (ref 8.6–10.5)
CALCIUM SPEC-SCNC: 8.2 MG/DL (ref 8.6–10.5)
CHLORIDE SERPL-SCNC: 101 MMOL/L (ref 98–107)
CHLORIDE SERPL-SCNC: 105 MMOL/L (ref 98–107)
CO2 SERPL-SCNC: 19 MMOL/L (ref 22–29)
CO2 SERPL-SCNC: 21 MMOL/L (ref 22–29)
CREAT SERPL-MCNC: 1.48 MG/DL (ref 0.76–1.27)
CREAT SERPL-MCNC: 1.52 MG/DL (ref 0.76–1.27)
DEPRECATED RDW RBC AUTO: 52.1 FL (ref 37–54)
EOSINOPHIL # BLD AUTO: 0.1 10*3/MM3 (ref 0–0.4)
EOSINOPHIL NFR BLD AUTO: 4.8 % (ref 0.3–6.2)
ERYTHROCYTE [DISTWIDTH] IN BLOOD BY AUTOMATED COUNT: 15.3 % (ref 12.3–15.4)
GFR SERPL CREATININE-BSD FRML MDRD: 53 ML/MIN/1.73
GFR SERPL CREATININE-BSD FRML MDRD: 54 ML/MIN/1.73
GLOBULIN UR ELPH-MCNC: 3.8 GM/DL
GLUCOSE SERPL-MCNC: 102 MG/DL (ref 65–99)
GLUCOSE SERPL-MCNC: 80 MG/DL (ref 65–99)
HCT VFR BLD AUTO: 25.1 % (ref 37.5–51)
HGB BLD-MCNC: 8.8 G/DL (ref 13–17.7)
LYMPHOCYTES # BLD AUTO: 0.6 10*3/MM3 (ref 0.7–3.1)
LYMPHOCYTES NFR BLD AUTO: 20.2 % (ref 19.6–45.3)
MCH RBC QN AUTO: 34.2 PG (ref 26.6–33)
MCHC RBC AUTO-ENTMCNC: 34.8 G/DL (ref 31.5–35.7)
MCV RBC AUTO: 98.3 FL (ref 79–97)
MONOCYTES # BLD AUTO: 0.5 10*3/MM3 (ref 0.1–0.9)
MONOCYTES NFR BLD AUTO: 16.4 % (ref 5–12)
NEUTROPHILS NFR BLD AUTO: 1.7 10*3/MM3 (ref 1.7–7)
NEUTROPHILS NFR BLD AUTO: 58 % (ref 42.7–76)
NRBC BLD AUTO-RTO: 0.1 /100 WBC (ref 0–0.2)
PLATELET # BLD AUTO: 43 10*3/MM3 (ref 140–450)
PMV BLD AUTO: 7 FL (ref 6–12)
POTASSIUM SERPL-SCNC: 4.9 MMOL/L (ref 3.5–5.2)
POTASSIUM SERPL-SCNC: 5.4 MMOL/L (ref 3.5–5.2)
PROT SERPL-MCNC: 6.6 G/DL (ref 6–8.5)
RBC # BLD AUTO: 2.56 10*6/MM3 (ref 4.14–5.8)
SODIUM SERPL-SCNC: 131 MMOL/L (ref 136–145)
SODIUM SERPL-SCNC: 132 MMOL/L (ref 136–145)
WBC # BLD AUTO: 3 10*3/MM3 (ref 3.4–10.8)

## 2021-10-01 PROCEDURE — 80048 BASIC METABOLIC PNL TOTAL CA: CPT | Performed by: INTERNAL MEDICINE

## 2021-10-01 PROCEDURE — 99232 SBSQ HOSP IP/OBS MODERATE 35: CPT | Performed by: NURSE PRACTITIONER

## 2021-10-01 PROCEDURE — 80053 COMPREHEN METABOLIC PANEL: CPT | Performed by: NURSE PRACTITIONER

## 2021-10-01 PROCEDURE — 81050 URINALYSIS VOLUME MEASURE: CPT | Performed by: NURSE PRACTITIONER

## 2021-10-01 PROCEDURE — 99217 PR OBSERVATION CARE DISCHARGE MANAGEMENT: CPT | Performed by: INTERNAL MEDICINE

## 2021-10-01 PROCEDURE — 82525 ASSAY OF COPPER: CPT | Performed by: NURSE PRACTITIONER

## 2021-10-01 PROCEDURE — 85025 COMPLETE CBC W/AUTO DIFF WBC: CPT | Performed by: NURSE PRACTITIONER

## 2021-10-01 PROCEDURE — 82570 ASSAY OF URINE CREATININE: CPT | Performed by: NURSE PRACTITIONER

## 2021-10-01 RX ORDER — FUROSEMIDE 40 MG/1
40 TABLET ORAL DAILY
Qty: 30 TABLET | Refills: 0 | Status: SHIPPED | OUTPATIENT
Start: 2021-10-01

## 2021-10-01 RX ORDER — LACTULOSE 10 G/15ML
10 SOLUTION ORAL DAILY
Qty: 946 ML | Refills: 0 | Status: SHIPPED | OUTPATIENT
Start: 2021-10-01

## 2021-10-01 RX ORDER — SODIUM POLYSTYRENE SULFONATE 15 G/60ML
15 SUSPENSION ORAL; RECTAL ONCE
Status: COMPLETED | OUTPATIENT
Start: 2021-10-01 | End: 2021-10-01

## 2021-10-01 RX ADMIN — FOLIC ACID 1 MG: 1 TABLET ORAL at 08:29

## 2021-10-01 RX ADMIN — Medication 250 MG: at 08:29

## 2021-10-01 RX ADMIN — Medication 220 MG: at 08:29

## 2021-10-01 RX ADMIN — SODIUM BICARBONATE 650 MG TABLET 650 MG: at 08:36

## 2021-10-01 RX ADMIN — LACTULOSE 10 G: 20 SOLUTION ORAL at 08:29

## 2021-10-01 RX ADMIN — Medication 100 MG: at 08:36

## 2021-10-01 RX ADMIN — SODIUM BICARBONATE: 84 INJECTION, SOLUTION INTRAVENOUS at 02:51

## 2021-10-01 RX ADMIN — SODIUM POLYSTYRENE SULFONATE 15 G: 15 SUSPENSION ORAL; RECTAL at 11:52

## 2021-10-01 RX ADMIN — Medication 10 ML: at 08:36

## 2021-10-01 NOTE — PLAN OF CARE
Goal Outcome Evaluation:  Plan of Care Reviewed With: patient        Progress: improving  Outcome Summary: Pt is stable with no complaints. Will follow up with GI and nephrology. Discharging home with family.

## 2021-10-01 NOTE — PROGRESS NOTES
" LOS: 1 day   Patient Care Team:  Nicole Mcduffie APRN as PCP - General (Nurse Practitioner)      Subjective \"I am feeing pretty good\"    Interval History:     Subjective: Feeling a lot better today.  Reports 6.8 L from paracentesis yesterday.  No nausea or vomiting and tolerating diet.  No abdominal pain.      ROS:   No chest pain, shortness of breath, or cough.        Medication Review:     Current Facility-Administered Medications:   •  albuterol (PROVENTIL) nebulizer solution 0.5% 2.5 mg/0.5mL, 10 mg, Nebulization, Once, Brando Wiley PA  •  folic acid (FOLVITE) tablet 1 mg, 1 mg, Oral, Daily, Kylah Monzon APRN, 1 mg at 10/01/21 0829  •  lactulose (CHRONULAC) 10 GM/15ML solution 10 g, 10 g, Oral, Daily, Missy Diana APRN, 10 g at 10/01/21 0829  •  ondansetron (ZOFRAN) injection 4 mg, 4 mg, Intravenous, Q6H PRN, Kylah Monzon APRN  •  saccharomyces boulardii (FLORASTOR) capsule 250 mg, 250 mg, Oral, BID, Kylah Monzon APRN, 250 mg at 10/01/21 0829  •  sodium bicarbonate tablet 650 mg, 650 mg, Oral, BID, Slick Weaver MD, 650 mg at 10/01/21 0836  •  sodium chloride 0.45 % 925 mL with sodium bicarbonate 8.4 % 75 mEq infusion, , Intravenous, Continuous, Slick Weaver MD, Last Rate: 75 mL/hr at 10/01/21 0251, New Bag at 10/01/21 0251  •  [COMPLETED] Insert peripheral IV, , , Once **AND** sodium chloride 0.9 % flush 10 mL, 10 mL, Intravenous, PRN, Brando Wiley PA  •  sodium chloride 0.9 % flush 10 mL, 10 mL, Intravenous, Q12H, Kylah Monzon APRN, 10 mL at 10/01/21 0836  •  sodium chloride 0.9 % flush 10 mL, 10 mL, Intravenous, PRN, Kylah Monzon APRN  •  thiamine (VITAMIN B-1) tablet 100 mg, 100 mg, Oral, Daily, Kylah Monzon APRN, 100 mg at 10/01/21 0836  •  zinc sulfate (ZINCATE) capsule 220 mg, 220 mg, Oral, Daily, Kylah Monzon APRN, 220 mg at 10/01/21 0829      Objective Resting in bed, no acute distress, family in room    Vital Signs  Vitals:    09/30/21 1749 09/30/21 2226 10/01/21 0628 10/01/21 1024 "   BP: 125/75 130/74 119/76 109/72   BP Location: Right arm Right arm Right arm Right arm   Patient Position: Lying Lying Lying Lying   Pulse: 97 77  94   Resp: 14 (!) 74 18 17   Temp: 97.8 °F (36.6 °C) 97.9 °F (36.6 °C) 97.8 °F (36.6 °C) 98.5 °F (36.9 °C)   TempSrc: Oral Oral Oral Oral   SpO2: 100% 100% 99% 100%   Weight:   80 kg (176 lb 5.9 oz)    Height:           Physical Exam:     General Appearance:    Awake and alert, in no acute distress   Head:    Normocephalic, without obvious abnormality   Eyes:          Conjunctivae normal, icteric sclera   Throat:   No oral lesions, no thrush, oral mucosa moist   Neck:  supple, no JVD   Lungs:     respirations regular, even and unlabored       Rectal:     Deferred   Extremities:   No edema, no cyanosis   Skin:   No bruising or rash,  jaundice        Results Review:    CBC    Results from last 7 days   Lab Units 10/01/21  0429 09/30/21  0452 09/29/21  0539 09/28/21  1552   WBC 10*3/mm3 3.00* 2.90* 3.70 4.10   HEMOGLOBIN g/dL 8.8* 8.7* 9.0* 9.6*   PLATELETS 10*3/mm3 43* 43* 47* 56*     CMP   Results from last 7 days   Lab Units 10/01/21  0429 09/30/21  0452 09/29/21  1717 09/29/21  0539 09/28/21  1950 09/28/21  1552   SODIUM mmol/L 132* 134* 132* 134* 135* 133*   POTASSIUM mmol/L 5.4* 5.4* 5.5* 5.4* 5.6* 5.8*   CHLORIDE mmol/L 105 108* 106 108* 108* 105   CO2 mmol/L 19.0* 17.0* 17.0* 19.0* 19.0* 18.0*   BUN mg/dL 27* 28* 30* 32* 33* 36*   CREATININE mg/dL 1.48* 1.58* 1.68* 1.69* 1.86* 2.14*   GLUCOSE mg/dL 80 82 118* 87 81 118*   ALBUMIN g/dL 2.80* 3.10*  --  2.40*  --  2.90*   BILIRUBIN mg/dL 1.6* 1.7*  --  1.4*  --  1.6*   ALK PHOS U/L 117 113  --  150*  --  139*   AST (SGOT) U/L 48* 49*  --  49*  --  47*   ALT (SGPT) U/L 33 33  --  32  --  36     Cr Clearance Estimated Creatinine Clearance: 79.6 mL/min (A) (by C-G formula based on SCr of 1.48 mg/dL (H)).  Coag   Results from last 7 days   Lab Units 09/30/21  0452 09/29/21  0948   INR  1.37* 1.33*     HbA1C No results  found for: HGBA1C  Blood Glucose No results found for: POCGLU  Infection   Results from last 7 days   Lab Units 09/30/21  0835   BODYFLDCX  No growth at 24 hours     UA      Radiology(recent) US Abdomen Limited    Result Date: 9/29/2021  1. Advanced cirrhotic liver morphology. No focal liver lesion is seen. 2. Large volume ascites. 3. Small right pleural effusion. 4. Gallbladder wall thickening is thought to be related to underlying liver disease. No gallstones.  Electronically Signed By-Evelyn Xavier MD On:9/29/2021 12:01 PM This report was finalized on [id number] by  Evelyn Xavier MD.         Assessment/Plan   BRENDA with hyperkalemia/hyponatremia--- improving  Cirrhosis (presumed ETOH) complicated by ascites  Alcohol abuse -sober since May  Macrocytic anemia     PLAN:  Paracentesis yesterday was 6.8 L removed.  No evidence of SBP.  Continue lactulose and Xifaxan with a goal of 2-3 bowel movements per day.'s full serology work-up reviewed with slightly low ceruloplasmin and 24 urine copper in progress.  Continued alcohol cessation.  Nephrology following for BRENDA with electrolyte abnormalities.  Diuretics currently on hold.  Paracentesis as needed for comfort.  Okay to discharge home from GI standpoint once cleared by nephrology and otherwise medically stable.  He can follow-up in the office with GI upon discharge.  We will see inpatient as needed, call questions or concerns.      Electronically signed by ROMEL Turpin, 10/01/21, 11:11 AM EDT.

## 2021-10-01 NOTE — PROGRESS NOTES
"                                                                                                                                      Nephrology  Progress Note                                        [facility]    Patient Identification    Name: Adam Alvares Jr.  Age: 35 y.o.  Sex: male  :  1985  MRN: [id number]      DATE OF SERVICE:  10/1/2021        Subective    No new complaints     Objective   Scheduled Meds:albuterol, 10 mg, Nebulization, Once  folic acid, 1 mg, Oral, Daily  lactulose, 10 g, Oral, Daily  saccharomyces boulardii, 250 mg, Oral, BID  sodium bicarbonate, 650 mg, Oral, BID  sodium chloride, 10 mL, Intravenous, Q12H  thiamine, 100 mg, Oral, Daily  zinc sulfate, 220 mg, Oral, Daily          Continuous Infusions:custom IV infusion builder, , Last Rate: 75 mL/hr at 10/01/21 0251        PRN Meds:ondansetron  •  [COMPLETED] Insert peripheral IV **AND** sodium chloride  •  sodium chloride     Exam:  /76 (BP Location: Right arm, Patient Position: Lying)   Pulse 77   Temp 97.8 °F (36.6 °C) (Oral)   Resp 18   Ht 188 cm (74\")   Wt 80 kg (176 lb 5.9 oz)   SpO2 99%   BMI 22.64 kg/m²     Intake/Output last 3 shifts:  I/O last 3 completed shifts:  In: 1480 [P.O.:480; I.V.:1000]  Out: 7200 [Urine:400; Other:6800]    Intake/Output this shift:  No intake/output data recorded.    Physical exam:  General Appearance:  Alert  Head:  Normocephalic, without obvious abnormality, atraumatic  Eyes:  PERRL, conjunctiva/corneas clear     Neck:  Supple,  no adenopathy;      Lungs:  Decreased BS occasion ronchi  Heart:  Regular rate and rhythm, S1 and S2 normal  Abdomen:  Soft, non-tender, bowel sounds active   Extremities: trace edema  Pulses: 2+ and symmetric all extremities  Skin:  No rashes or lesions       Data Review:  All labs (24hrs):   Recent Results (from the past 24 hour(s))   Body Fluid Culture - Body Fluid, Peritoneum    Collection Time: 21  8:35 AM    Specimen: " Peritoneum; Body Fluid   Result Value Ref Range    Gram Stain Rare (1+) WBCs per low power field     Gram Stain No organisms seen    Albumin, Fluid - Body Fluid, Peritoneum    Collection Time: 09/30/21  8:35 AM    Specimen: Peritoneum; Body Fluid   Result Value Ref Range    Albumin, Fluid <0.40 g/dL   Body fluid cell count - Body Fluid, Peritoneum    Collection Time: 09/30/21  8:35 AM    Specimen: Peritoneum; Body Fluid   Result Value Ref Range    Color, Fluid Yellow     Appearance, Fluid Clear Clear    Nucleated Cells, Fluid 104 /mm3    Method: Automated DXH Analyzer    Body fluid differential - Body Fluid, Peritoneum    Collection Time: 09/30/21  8:35 AM    Specimen: Peritoneum; Body Fluid   Result Value Ref Range    Neutrophils, Fluid 2 %    Lymphocytes, Fluid 78 %    Monocytes, Fluid 2 %    Mesothelial Cells, Fluid 18 %   Comprehensive Metabolic Panel    Collection Time: 10/01/21  4:29 AM    Specimen: Blood   Result Value Ref Range    Glucose 80 65 - 99 mg/dL    BUN 27 (H) 6 - 20 mg/dL    Creatinine 1.48 (H) 0.76 - 1.27 mg/dL    Sodium 132 (L) 136 - 145 mmol/L    Potassium 5.4 (H) 3.5 - 5.2 mmol/L    Chloride 105 98 - 107 mmol/L    CO2 19.0 (L) 22.0 - 29.0 mmol/L    Calcium 8.2 (L) 8.6 - 10.5 mg/dL    Total Protein 6.6 6.0 - 8.5 g/dL    Albumin 2.80 (L) 3.50 - 5.20 g/dL    ALT (SGPT) 33 1 - 41 U/L    AST (SGOT) 48 (H) 1 - 40 U/L    Alkaline Phosphatase 117 39 - 117 U/L    Total Bilirubin 1.6 (H) 0.0 - 1.2 mg/dL    eGFR Non African Amer 54 (L) >60 mL/min/1.73    Globulin 3.8 gm/dL    A/G Ratio 0.7 g/dL    BUN/Creatinine Ratio 18.2 7.0 - 25.0    Anion Gap 8.0 5.0 - 15.0 mmol/L   CBC Auto Differential    Collection Time: 10/01/21  4:29 AM    Specimen: Blood   Result Value Ref Range    WBC 3.00 (L) 3.40 - 10.80 10*3/mm3    RBC 2.56 (L) 4.14 - 5.80 10*6/mm3    Hemoglobin 8.8 (L) 13.0 - 17.7 g/dL    Hematocrit 25.1 (L) 37.5 - 51.0 %    MCV 98.3 (H) 79.0 - 97.0 fL    MCH 34.2 (H) 26.6 - 33.0 pg    MCHC 34.8 31.5 - 35.7  g/dL    RDW 15.3 12.3 - 15.4 %    RDW-SD 52.1 37.0 - 54.0 fl    MPV 7.0 6.0 - 12.0 fL    Platelets 43 (C) 140 - 450 10*3/mm3    Neutrophil % 58.0 42.7 - 76.0 %    Lymphocyte % 20.2 19.6 - 45.3 %    Monocyte % 16.4 (H) 5.0 - 12.0 %    Eosinophil % 4.8 0.3 - 6.2 %    Basophil % 0.6 0.0 - 1.5 %    Neutrophils, Absolute 1.70 1.70 - 7.00 10*3/mm3    Lymphocytes, Absolute 0.60 (L) 0.70 - 3.10 10*3/mm3    Monocytes, Absolute 0.50 0.10 - 0.90 10*3/mm3    Eosinophils, Absolute 0.10 0.00 - 0.40 10*3/mm3    Basophils, Absolute 0.00 0.00 - 0.20 10*3/mm3    nRBC 0.1 0.0 - 0.2 /100 WBC          Imaging:  [unfilled]    Assessment/Plan:     Cirrhosis with alcoholism (HCC)    Macrocytic anemia    Hyperkalemia    BRENDA (acute kidney injury) (HCC)    Moderate malnutrition (CMS/HCC)    Acute kidney injury on chronic kidney disease  Hyperkalemia  Cirrhosis   Anemia  Protein malnutrition     k 5.4 same  Creat 1.48  co2 19 from 17  1 more dose of bicarb  To start Lasix on discharge  Repeat labs at 3 PM to make sure potassium is better

## 2021-10-01 NOTE — PLAN OF CARE
Goal Outcome Evaluation:  Plan of Care Reviewed With: patient        Progress: improving  Outcome Summary: Pt is awaiting labs to monitor electrolytes. Pt finished 24hr urine at 1500. Anticipating discharge. Will continue to monitor.

## 2021-10-01 NOTE — DISCHARGE SUMMARY
[facility] Medicine Services  DISCHARGE SUMMARY    Patient Name: Adam Alvares Jr.  : 1985  MRN: [id number]    Date of Admission: 2021  Date of Discharge: 10/1/2021  Primary Care Physician: Nicole Mcduffie APRN      Presenting Problem:   Hyperkalemia [E87.5]  BRENDA (acute kidney injury) (HCC) [N17.9]  Alcoholic cirrhosis, unspecified whether ascites present (HCC) [K70.30]    Active and Resolved Hospital Problems:  Active Hospital Problems    Diagnosis POA   • Moderate malnutrition (CMS/HCC) [E44.0] Yes   • BRENDA (acute kidney injury) (HCC) [N17.9] Yes   • Hyperkalemia [E87.5] Yes   • Cirrhosis with alcoholism (HCC) [K70.30] Yes   • Macrocytic anemia [D53.9] Yes      Resolved Hospital Problems   No resolved problems to display.         Hospital Course     Hospital Course:  Adam Alvares Jr. is a 35 y.o. male with past medical history of alcoholic cirrhosis who presented to [facility] on 2021 complaining of abnormal labs.  Patient was originally admitted on 2021 to the emergency observation unit.  Patient had been on potassium sparing diuretics with recent increase and recent discontinuation of Lasix with potassium supplementation.  Patient is being followed by gastroenterology for known liver failure secondary to alcohol abuse with 3 months alcohol cessation.  Patient was noted to have BRENDA and hyperkalemia admission.  Nephrology was also consulted along with GI.  Patient started on IV fluids and diuretics were held.  Patient also underwent paracentesis on  with 6.8 L removal.  Cultures remain negative, no signs of any SBP noted.  Patient's creatinine has improved and his potassium is also improved.  He has been cleared to discharge from GI and nephrology standpoint.  Patient stable to discharge home today with follow-up with PCP, nephrology, and GI as an outpatient.        DISCHARGE Follow Up Recommendations for labs and diagnostics: Follow-up with  PCP, nephrology, and GI      Reasons For Change In Medications and Indications for New Medications:  Med changes as below.    Day of Discharge     Vital Signs:  Temp:  [97.8 °F (36.6 °C)-98.5 °F (36.9 °C)] 98.5 °F (36.9 °C)  Heart Rate:  [] 94  Resp:  [14-74] 17  BP: (108-130)/(69-76) 109/72    Physical Exam:  General: Awake, alert, young male, lying in bed, NAD  Eyes: PERRL, EOMI, conjunctive are clear  Cardiovascular: Regular rate and rhythm, no murmurs  Respiratory: Clear to auscultation bilaterally, no wheezing or rales, unlabored breathing  Abdomen: Soft, nontender, distended, positive bowel sounds, no guarding  Neurologic: A&O, CN grossly intact, moves all extremities spontaneously  Musculoskeletal: Normal range of motion, no deformities  Skin: Warm, dry, intact        Pertinent  and/or Most Recent Results     LAB RESULTS:      Lab 10/01/21  0429 09/30/21  0452 09/29/21  0948 09/29/21  0539 09/28/21  1552   WBC 3.00* 2.90*  --  3.70 4.10   HEMOGLOBIN 8.8* 8.7*  --  9.0* 9.6*   HEMATOCRIT 25.1* 25.5*  --  26.2* 27.5*   PLATELETS 43* 43*  --  47* 56*   NEUTROS ABS 1.70 1.80  --  2.40 2.90   LYMPHS ABS 0.60* 0.50*  --  0.50* 0.50*   MONOS ABS 0.50 0.40  --  0.60 0.50   EOS ABS 0.10 0.20  --  0.20 0.20   MCV 98.3* 99.2*  --  99.9* 98.7*   PROTIME  --  14.9* 14.5*  --   --          Lab 10/01/21  0429 09/30/21  0452 09/29/21  1717 09/29/21  0539 09/28/21  1950   SODIUM 132* 134* 132* 134* 135*   POTASSIUM 5.4* 5.4* 5.5* 5.4* 5.6*   CHLORIDE 105 108* 106 108* 108*   CO2 19.0* 17.0* 17.0* 19.0* 19.0*   ANION GAP 8.0 9.0 9.0 7.0 8.0   BUN 27* 28* 30* 32* 33*   CREATININE 1.48* 1.58* 1.68* 1.69* 1.86*   GLUCOSE 80 82 118* 87 81   CALCIUM 8.2* 8.2* 8.0* 7.7* 7.9*         Lab 10/01/21  0429 09/30/21  0452 09/29/21  1717 09/29/21  0539 09/28/21  1552   TOTAL PROTEIN 6.6 6.5 7.2 6.5 7.3   ALBUMIN 2.80* 3.10*  --  2.40* 2.90*   GLOBULIN 3.8 3.4  --   --  4.4   ALT (SGPT) 33 33  --  32 36   AST (SGOT) 48* 49*  --  49*  47*   BILIRUBIN 1.6* 1.7*  --  1.4* 1.6*   INDIRECT BILIRUBIN  --   --   --  0.7  --    BILIRUBIN DIRECT  --   --   --  0.7*  --    ALK PHOS 117 113  --  150* 139*         Lab 09/30/21  0452 09/29/21  0948 09/28/21  1552   TROPONIN T  --   --  0.025   PROTIME 14.9* 14.5*  --    INR 1.37* 1.33*  --              Lab 09/29/21  0539   IRON 91         Brief Urine Lab Results  (Last result in the past 365 days)      Color   Clarity   Blood   Leuk Est   Nitrite   Protein   CREAT   Urine HCG        06/01/21 1219 Red Cloudy Large (3+) Small (1+) Positive 30 mg/dL (1+)             Microbiology Results (last 10 days)     Procedure Component Value - Date/Time    Body Fluid Culture - Body Fluid, Peritoneum [[id number]] Collected: 09/30/21 0835    Lab Status: Preliminary result Specimen: Body Fluid from Peritoneum Updated: 10/01/21 0847     Body Fluid Culture No growth at 24 hours     Gram Stain Rare (1+) WBCs per low power field      No organisms seen    COVID PRE-OP / PRE-PROCEDURE SCREENING ORDER (NO ISOLATION) - Swab, Nasopharynx [[id number]]  (Normal) Collected: 09/28/21 1722    Lab Status: Final result Specimen: Swab from Nasopharynx Updated: 09/28/21 1755    Narrative:      The following orders were created for panel order COVID PRE-OP / PRE-PROCEDURE SCREENING ORDER (NO ISOLATION) - Swab, Nasopharynx.  Procedure                               Abnormality         Status                     ---------                               -----------         ------                     COVID-19,CEPHEID/WAYNE/BD...[[id number]]  Normal              Final result                 Please view results for these tests on the individual orders.    COVID-19,CEPHEID/WAYNE/BDMAX,COR/KEYON/PAD/BETHANY IN-HOUSE(OR EMERGENT/ADD-ON),NP SWAB IN TRANSPORT MEDIA 3-4 HR TAT, RT-PCR - Swab, Nasopharynx [[id number]]  (Normal) Collected: 09/28/21 1722    Lab Status: Final result Specimen: Swab from Nasopharynx Updated: 09/28/21 1755     COVID19 Not Detected     Narrative:      Fact sheet for providers: https://www.fda.gov/media/841210/download     Fact sheet for patients: https://www.fda.gov/media/021520/download  Fact sheet for providers: https://www.fda.gov/media/083604/download    Fact sheet for patients: https://www.fda.gov/media/785949/download    Test performed by PCR.          US Abdomen Limited    Result Date: 9/29/2021  Impression: 1. Advanced cirrhotic liver morphology. No focal liver lesion is seen. 2. Large volume ascites. 3. Small right pleural effusion. 4. Gallbladder wall thickening is thought to be related to underlying liver disease. No gallstones.  Electronically Signed By-Evelyn Xavier MD On:9/29/2021 12:01 PM This report was finalized on [id number] by  Evelyn Xavier MD.              Results for orders placed during the hospital encounter of 06/01/21    Adult Transthoracic Echo Complete W/ Cont if Necessary Per Protocol    Interpretation Summary  · Estimated left ventricular EF was in disagreement with the calculated left ventricular EF. Left ventricular ejection fraction appears to be greater than 70%. Left ventricular systolic function is hyperdynamic (EF > 70%).  · Left ventricular diastolic function was normal.  · Mild aortic valve stenosis is present.  · Estimated right ventricular systolic pressure from tricuspid regurgitation is normal (<35 mmHg).  · There is a large left pleural effusion. There is a large right pleural effusion.    Preserved LV and RV size and function, EF 70% normal global and regional wall motion  Normal atrial sizes grossly,  Mildly elevated aortic gradients peak of 25 mean of 13, no regurgitation, nodular echodensity on the right and left coronary cusp margin immobile similar to calcification, valve leaflets otherwise open freely  Trace MR posteriorly directed no stenosis  Trace to mild TR normal PA systolic pressure no stenosis  Pulmonic valve not well seen no stenosis by Doppler imaging  Normal diastolic function by  criteria  Bilateral large left and right pleural effusions  Small circumferential pericardial effusion, no evidence of tamponade or increased pericardial pressure seen      Labs Pending at Discharge:  Pending Labs     Order Current Status    ANDRES In process    Body Fluid Culture - Body Fluid, Peritoneum Preliminary result          Procedures Performed    09/30 1521 Therapeutic, Diagnostic Bedside Paracentesis Without  Radiology, BEDSIDE PARACENTESIS      Consults:   Consults     Date and Time Order Name Status Description    9/30/2021  1:01 PM Inpatient Hospitalist Consult      9/29/2021 12:35 AM Inpatient Nephrology Consult Completed     9/29/2021 12:35 AM Gastroenterology (on-call MD unless specified) Completed             Discharge Details        Discharge Medications      Continue These Medications      Instructions Start Date   folic acid 1 MG tablet  Commonly known as: FOLVITE   1 mg, Oral, Daily      furosemide 40 MG tablet  Commonly known as: LASIX   40 mg, Oral, Daily      lactulose 10 GM/15ML solution  Commonly known as: CHRONULAC   10 g, Oral, Daily      saccharomyces boulardii 250 MG capsule  Commonly known as: Florastor   250 mg, Oral, 2 Times Daily      Thiamine Mononitrate 100 MG tablet   100 mg, Oral, Daily      zinc sulfate 220 (50 Zn) MG capsule  Commonly known as: ZINCATE   220 mg, Oral, Daily         Stop These Medications    cefdinir 300 MG capsule  Commonly known as: OMNICEF     spironolactone 100 MG tablet  Commonly known as: ALDACTONE            No Known Allergies      Discharge Disposition:      Diet:  Hospital:  Diet Order   Procedures   • Diet Cardiac; 2gm Na+         Discharge Activity:         CODE STATUS:  Code Status and Medical Interventions:   Ordered at: 09/28/21 1954     Code Status:    CPR     Medical Interventions (Level of Support Prior to Arrest):    Full         No future appointments.        Time spent on Discharge including face to face service:  35  minutes    Signature:    Electronically signed by Lul White DO, 10/01/21, 11:36 AM EDT.

## 2021-10-01 NOTE — CASE MANAGEMENT/SOCIAL WORK
Social Work Assessment  [facility]     Patient Name: Adam Alvares Jr.  MRN: [id number]  Today's Date: 10/1/2021    Admit Date: 9/28/2021    Discharge Plan     Row Name 10/01/21 0850       Plan    Plan Comments  Contacted patient via room phone. Patient confirmed that he is still working full-time for [employer]. (TGMI: $2,929.76) and reports that open enrollment is the month of October. Reports that he will be applying for insurance through his employer. Further stated he hasn't had any trouble affording his medications and has been able to obtain what he needs. Lastly, patient was established with [employer]. PCP program in June 2021 admission, reports continued follow up with provider for primary care. No other needs at this time.     Phone communication or documentation only - no physical contact with patient or family.  COLE Mars    Phone: [phone]  Cell: [phone]  Fax: [phone]  [email or web address]

## 2021-10-01 NOTE — PLAN OF CARE
Goal Outcome Evaluation:  Plan of Care Reviewed With: patient        Progress: improving  Outcome Summary: Pt is on room air, VSS, IV fluids. 24 hour urine . Pt has been resting wel with no complaints will continue to monitor.

## 2021-10-02 ENCOUNTER — READMISSION MANAGEMENT (OUTPATIENT)
Dept: CALL CENTER | Facility: HOSPITAL | Age: 36
End: 2021-10-02

## 2021-10-02 NOTE — OUTREACH NOTE
Prep Survey      Responses   [facility] facility patient discharged from?  [facility]   Is LACE score < 7 ?  No   Emergency Room discharge w/ pulse ox?  No   Eligibility  Not Eligible   What are the reasons patient is not eligible?  Other   Does the patient have one of the following disease processes/diagnoses(primary or secondary)?  Other   Prep survey completed?  Yes          Radha Parkinson RN

## 2021-10-04 LAB
COPPER 24H UR-MRATE: 17 UG/24 HR (ref 3–35)
COPPER UR-MCNC: 6 UG/L
COPPER/CREAT UR: 11 UG/G CREAT (ref 0–49)
CREAT UR-MCNC: 0.57 G/L (ref 0.3–3)

## 2021-10-05 LAB
BACTERIA FLD CULT: NORMAL
GRAM STN SPEC: NORMAL
GRAM STN SPEC: NORMAL

## 2021-10-12 ENCOUNTER — OFFICE (AMBULATORY)
Dept: URBAN - METROPOLITAN AREA CLINIC 64 | Facility: CLINIC | Age: 36
End: 2021-10-12

## 2021-10-12 VITALS
WEIGHT: 201 LBS | HEIGHT: 74 IN | HEART RATE: 107 BPM | DIASTOLIC BLOOD PRESSURE: 79 MMHG | SYSTOLIC BLOOD PRESSURE: 111 MMHG

## 2021-10-12 DIAGNOSIS — K70.30 ALCOHOLIC CIRRHOSIS OF LIVER WITHOUT ASCITES: ICD-10-CM

## 2021-10-12 DIAGNOSIS — R18.8 OTHER ASCITES: ICD-10-CM

## 2021-10-12 PROCEDURE — 99214 OFFICE O/P EST MOD 30 MIN: CPT | Performed by: NURSE PRACTITIONER

## 2021-10-12 RX ORDER — SPIRONOLACTONE 100 MG/1
100 TABLET, FILM COATED ORAL
Qty: 30 | Refills: 5 | Status: COMPLETED
Start: 2021-10-12 | End: 2021-12-01

## 2021-10-22 DIAGNOSIS — K70.31 ASCITES DUE TO ALCOHOLIC CIRRHOSIS (HCC): ICD-10-CM

## 2021-10-22 DIAGNOSIS — K70.31 ALCOHOLIC CIRRHOSIS OF LIVER WITH ASCITES (HCC): Primary | ICD-10-CM

## 2021-10-22 RX ORDER — ALBUMIN (HUMAN) 12.5 G/50ML
25 SOLUTION INTRAVENOUS DAILY PRN
Status: CANCELLED | OUTPATIENT
Start: 2021-10-22

## 2021-10-22 RX ORDER — ALBUMIN (HUMAN) 12.5 G/50ML
12.5 SOLUTION INTRAVENOUS DAILY PRN
Status: CANCELLED | OUTPATIENT
Start: 2021-10-22

## 2021-10-25 ENCOUNTER — HOSPITAL ENCOUNTER (OUTPATIENT)
Dept: INFUSION THERAPY | Facility: HOSPITAL | Age: 36
Discharge: HOME | End: 2021-10-25
Admitting: NURSE PRACTITIONER

## 2021-10-25 VITALS
DIASTOLIC BLOOD PRESSURE: 56 MMHG | HEART RATE: 102 BPM | SYSTOLIC BLOOD PRESSURE: 101 MMHG | RESPIRATION RATE: 16 BRPM | OXYGEN SATURATION: 100 %

## 2021-10-25 DIAGNOSIS — K70.31 ASCITES DUE TO ALCOHOLIC CIRRHOSIS (HCC): ICD-10-CM

## 2021-10-25 DIAGNOSIS — K70.31 ALCOHOLIC CIRRHOSIS OF LIVER WITH ASCITES (HCC): ICD-10-CM

## 2021-10-25 LAB
ALBUMIN FLD-MCNC: 0.5 G/DL
APPEARANCE FLD: CLEAR
COLOR FLD: YELLOW
DEPRECATED RDW RBC AUTO: 49.9 FL (ref 37–54)
ERYTHROCYTE [DISTWIDTH] IN BLOOD BY AUTOMATED COUNT: 14.8 % (ref 12.3–15.4)
HCT VFR BLD AUTO: 29.8 % (ref 37.5–51)
HGB BLD-MCNC: 10.5 G/DL (ref 13–17.7)
INR PPP: 1.27 (ref 0.93–1.1)
LYMPHOCYTES NFR FLD MANUAL: 65 %
MCH RBC QN AUTO: 34.3 PG (ref 26.6–33)
MCHC RBC AUTO-ENTMCNC: 35.3 G/DL (ref 31.5–35.7)
MCV RBC AUTO: 97.3 FL (ref 79–97)
MESOTHL CELL NFR FLD MANUAL: 13 %
METHOD: NORMAL
MONOCYTES NFR FLD: 14 %
NEUTROPHILS NFR FLD MANUAL: 8 %
NUC CELL # FLD: 92 /MM3
PLATELET # BLD AUTO: 55 10*3/MM3 (ref 140–450)
PMV BLD AUTO: 7.1 FL (ref 6–12)
PROT FLD-MCNC: <1 G/DL
PROTHROMBIN TIME: 13.8 SECONDS (ref 9.6–11.7)
RBC # BLD AUTO: 3.07 10*6/MM3 (ref 4.14–5.8)
RBC # FLD AUTO: 1369 /MM3
WBC # BLD AUTO: 4.2 10*3/MM3 (ref 3.4–10.8)

## 2021-10-25 PROCEDURE — 87205 SMEAR GRAM STAIN: CPT | Performed by: NURSE PRACTITIONER

## 2021-10-25 PROCEDURE — 83690 ASSAY OF LIPASE: CPT | Performed by: NURSE PRACTITIONER

## 2021-10-25 PROCEDURE — 87070 CULTURE OTHR SPECIMN AEROBIC: CPT | Performed by: NURSE PRACTITIONER

## 2021-10-25 PROCEDURE — 82042 OTHER SOURCE ALBUMIN QUAN EA: CPT | Performed by: NURSE PRACTITIONER

## 2021-10-25 PROCEDURE — 85610 PROTHROMBIN TIME: CPT | Performed by: NURSE PRACTITIONER

## 2021-10-25 PROCEDURE — 88108 CYTOPATH CONCENTRATE TECH: CPT | Performed by: NURSE PRACTITIONER

## 2021-10-25 PROCEDURE — 89051 BODY FLUID CELL COUNT: CPT | Performed by: NURSE PRACTITIONER

## 2021-10-25 PROCEDURE — 85027 COMPLETE CBC AUTOMATED: CPT | Performed by: NURSE PRACTITIONER

## 2021-10-25 PROCEDURE — 84157 ASSAY OF PROTEIN OTHER: CPT | Performed by: NURSE PRACTITIONER

## 2021-10-25 PROCEDURE — 49083 ABD PARACENTESIS W/IMAGING: CPT | Performed by: FAMILY MEDICINE

## 2021-10-25 PROCEDURE — 96366 THER/PROPH/DIAG IV INF ADDON: CPT

## 2021-10-25 PROCEDURE — 76942 ECHO GUIDE FOR BIOPSY: CPT

## 2021-10-25 PROCEDURE — 36415 COLL VENOUS BLD VENIPUNCTURE: CPT

## 2021-10-25 PROCEDURE — P9047 ALBUMIN (HUMAN), 25%, 50ML: HCPCS | Performed by: NURSE PRACTITIONER

## 2021-10-25 PROCEDURE — 96365 THER/PROPH/DIAG IV INF INIT: CPT

## 2021-10-25 PROCEDURE — 25010000002 ALBUMIN HUMAN 25% PER 50 ML: Performed by: NURSE PRACTITIONER

## 2021-10-25 RX ORDER — ALBUMIN (HUMAN) 12.5 G/50ML
25 SOLUTION INTRAVENOUS DAILY PRN
Status: DISCONTINUED | OUTPATIENT
Start: 2021-10-25 | End: 2021-10-27 | Stop reason: HOSPADM

## 2021-10-25 RX ORDER — ALBUMIN (HUMAN) 12.5 G/50ML
12.5 SOLUTION INTRAVENOUS DAILY PRN
Status: DISCONTINUED | OUTPATIENT
Start: 2021-10-25 | End: 2021-10-27 | Stop reason: HOSPADM

## 2021-10-25 RX ADMIN — ALBUMIN HUMAN 25 G: 0.25 SOLUTION INTRAVENOUS at 14:30

## 2021-10-25 RX ADMIN — ALBUMIN HUMAN 25 G: 0.25 SOLUTION INTRAVENOUS at 13:59

## 2021-10-25 NOTE — PATIENT INSTRUCTIONS
Paracentesis, Care After  This sheet gives you information about how to care for yourself after your procedure. Your health care provider may also give you more specific instructions. If you have problems or questions, contact your health care provider.  What can I expect after the procedure?  After the procedure, it is common to have a small amount of clear fluid coming from the puncture site.  Follow these instructions at home:  Puncture site care    · Follow instructions from your health care provider about how to take care of your puncture site. Make sure you:  ? Wash your hands with soap and water before and after you change your bandage (dressing). If soap and water are not available, use hand .  ? Change your dressing as told by your health care provider.  · Check your puncture area every day for signs of infection. Check for:  ? Redness, swelling, or pain.  ? More fluid or blood.  ? Warmth.  ? Pus or a bad smell.    General instructions  · Return to your normal activities as told by your health care provider. Ask your health care provider what activities are safe for you.  · Take over-the-counter and prescription medicines only as told by your health care provider.  · Do not take baths, swim, or use a hot tub until your health care provider approves. Ask your health care provider if you may take showers. You may only be allowed to take sponge baths.  · Keep all follow-up visits as told by your health care provider. This is important.  Contact a health care provider if:  · You have redness, swelling, or pain at your puncture site.  · You have more fluid or blood coming from your puncture site.  · Your puncture site feels warm to the touch.  · You have pus or a bad smell coming from your puncture site.  · You have a fever.  Get help right away if:  · You have chest pain or shortness of breath.  · You develop increasing pain, discomfort, or swelling in your abdomen.  · You feel dizzy or light-headed or  you faint.  Summary  · After the procedure, it is common to have a small amount of clear fluid coming from the puncture site.  · Follow instructions from your health care provider about how to take care of your puncture site.  · Check your puncture area every day signs of infection.  · Keep all follow-up visits as told by your health care provider.  This information is not intended to replace advice given to you by your health care provider. Make sure you discuss any questions you have with your health care provider.  Document Revised: 06/30/2020 Document Reviewed: 10/08/2019  Elsevier Patient Education © 2021 [employer]

## 2021-10-25 NOTE — PROGRESS NOTES
PARACENTESIS    Time Arrived in Department: 1120      Consent: yes  Allergies have been Reviewed: yes      ID Band Verified: yes    Method: Ambulatory    Lab Results: Checked and MD Notified     Physician: Dr. MARIA TERESA Barron      Nurse: Yanely Ahmadi RN    IR Care Plan: Person Educated - Patient, Current Knowledge - Understands, Learning Barrier - None, Readiness to Learn - Acceptance, Teaching Topic - Procedure and Evaluation of Teaching - Verbalized Understanding      Neurological: Within Normal Limits    Skin: Warm, Dry and Jaundice    Respiratory Status: Respirations even and enlabored    Room In: Ambulatory Dept. Room 9      Procedure: Paracentesis    Time Out Completed: yes    Time Out: 1340    Prep Site 1: Right Lateral Abdomen      Prep: Chlorhexidine and Sterile drape    Procedure Position: Right Side and Semi Fowlers    Guidance: Ultrasound    Fluid Quality: Cloudy and [place] Yellow    Procedure Note: Procedure start at 1344. 1% lidocaine with epi used. Specimens drawn and sent to lab. No complications noted.        Intra Time 1:1400    Intra Assess 1:   LOC: Alert  Pain:  No Issues and Tolerating  Skin: Warm, Dry and Jaundice  Respiratory Status:  Even and Unlabored  Intra Procedure Note 1:         Intra Time 2: 1415    Intra Assess 2:   LOC: Alert  Pain: No Issues and Tolerating  Skin: Warm, Dry and Jaundice  Respiratory Status: Even and Unlabored  Intra Procedure Note 2:         Intra Time 3: 1445    Intra Assess 3:   LOC: Alert  Pain: No Issues and Tolerating  Skin: Warm, Dry and Jaundice  Respiratory Status: Even and Unlabored  Intra Procedure Note 3:para completed                Post-Procedure Position: HOB Elevated    Dressing Site 1: 2x2, 4x4 and Tegaderm    Post Procedure Status:  Alert and Oriented, returned to baseline, Dressing Dry/ Intact, IV documented (see flowsheet), Stable Condition and Dressing properly labeled    Specimen To Lab: yes    Total Fluid Removed: 7.2 liters    Post Procedure  Note:  Procedure complete at 1445. 50 grams of albumin given per MD order. No complications noted.        Report Given To:      Admit/Transfer To:      Transfer Time:      Discharge Time:  1520    Method: Ambulatory    O2 on Transfer: no    Accompanied By:  Father     Clothes Changed:  Self    Discharged Location:  Routine to Home    Discharge Teaching:  Care of Dressing

## 2021-10-25 NOTE — PROCEDURES
Procedure:Ultrasound guided Paracentesis    Consent: Informed    Pre op diagnosis: Cirrhosis, Massive ascites    Post op diagnosis: Cirrhosis, Massive ascites    Anesthesia Provider: .Giovani Barron MD    Anesthesia type: Local infiltration with 1% xylocaine     Surgeon: .Giovani Barron MD    Assistant: none    Significant Clinical Findings:    Procedure summary:    Allergies , labs and medications were reviewed. Patient was made to lay supine and the area of interest was imaged with ultrasound and fluid verified and marked.  Area of interest was cleaned and draped in a sterile fashion. subsequently local infiltration with xylocaine. Trocar and canula were advanced. Upon verification of the fluid, trocar was steadied and canula advanced further. Trocar was drawn out. Canula was connected to extension tubing and to the vacuum bottle. Subsequently the canula was removed after completion of the procedure.     Findings: Clear straw yellow fluid was obtained. See nursing documentation for volume drained.    Lab Specimen: 40ml    Complication: none    EBL: 0 ml    Post op condition: Stable. Albumin was given by protocol if needed.    Post op plan: Discharge back to the referring physician.

## 2021-10-27 LAB
LAB AP CASE REPORT: NORMAL
LIPASE FLD-CCNC: 26 U/L
PATH REPORT.FINAL DX SPEC: NORMAL
PATH REPORT.GROSS SPEC: NORMAL

## 2021-10-30 LAB
BACTERIA FLD CULT: NORMAL
GRAM STN SPEC: NORMAL
GRAM STN SPEC: NORMAL

## 2021-12-01 ENCOUNTER — OFFICE (AMBULATORY)
Dept: URBAN - METROPOLITAN AREA CLINIC 64 | Facility: CLINIC | Age: 36
End: 2021-12-01

## 2021-12-01 VITALS
SYSTOLIC BLOOD PRESSURE: 103 MMHG | WEIGHT: 197 LBS | HEIGHT: 74 IN | HEART RATE: 98 BPM | DIASTOLIC BLOOD PRESSURE: 77 MMHG

## 2021-12-01 DIAGNOSIS — R18.8 OTHER ASCITES: ICD-10-CM

## 2021-12-01 DIAGNOSIS — K70.30 ALCOHOLIC CIRRHOSIS OF LIVER WITHOUT ASCITES: ICD-10-CM

## 2021-12-01 PROCEDURE — 99213 OFFICE O/P EST LOW 20 MIN: CPT | Performed by: NURSE PRACTITIONER

## 2022-01-04 ENCOUNTER — TELEPHONE (OUTPATIENT)
Dept: INFUSION THERAPY | Facility: HOSPITAL | Age: 37
End: 2022-01-04

## 2022-01-04 NOTE — TELEPHONE ENCOUNTER
Patient called to schedule a paracentesis. Per the current paracentesis order it was a 1 X order. Asked Pt to call [facility] office to have them send a new order.       By Kylah Devine Reg[facility] Rep

## 2022-01-11 DIAGNOSIS — K70.31 ASCITES DUE TO ALCOHOLIC CIRRHOSIS: ICD-10-CM

## 2022-01-11 DIAGNOSIS — K70.31 ALCOHOLIC CIRRHOSIS OF LIVER WITH ASCITES: Primary | ICD-10-CM

## 2022-01-11 RX ORDER — ALBUMIN (HUMAN) 12.5 G/50ML
12.5 SOLUTION INTRAVENOUS DAILY PRN
Status: CANCELLED | OUTPATIENT
Start: 2022-01-11

## 2022-01-11 RX ORDER — ALBUMIN (HUMAN) 12.5 G/50ML
25 SOLUTION INTRAVENOUS DAILY PRN
Status: CANCELLED | OUTPATIENT
Start: 2022-01-11

## 2022-01-12 ENCOUNTER — HOSPITAL ENCOUNTER (OUTPATIENT)
Dept: INFUSION THERAPY | Facility: HOSPITAL | Age: 37
Discharge: HOME | End: 2022-01-12
Admitting: NURSE PRACTITIONER

## 2022-01-12 VITALS
SYSTOLIC BLOOD PRESSURE: 110 MMHG | RESPIRATION RATE: 14 BRPM | DIASTOLIC BLOOD PRESSURE: 63 MMHG | OXYGEN SATURATION: 100 % | HEART RATE: 94 BPM

## 2022-01-12 DIAGNOSIS — K70.31 ASCITES DUE TO ALCOHOLIC CIRRHOSIS: ICD-10-CM

## 2022-01-12 DIAGNOSIS — K70.31 ALCOHOLIC CIRRHOSIS OF LIVER WITH ASCITES: ICD-10-CM

## 2022-01-12 LAB
ALBUMIN FLD-MCNC: <0.4 G/DL
APPEARANCE FLD: ABNORMAL
COLOR FLD: YELLOW
DEPRECATED RDW RBC AUTO: 48.6 FL (ref 37–54)
ERYTHROCYTE [DISTWIDTH] IN BLOOD BY AUTOMATED COUNT: 15.2 % (ref 12.3–15.4)
HCT VFR BLD AUTO: 27.6 % (ref 37.5–51)
HGB BLD-MCNC: 10 G/DL (ref 13–17.7)
INR PPP: 1.35 (ref 0.93–1.1)
LYMPHOCYTES NFR FLD MANUAL: 60 %
MCH RBC QN AUTO: 32.2 PG (ref 26.6–33)
MCHC RBC AUTO-ENTMCNC: 36.3 G/DL (ref 31.5–35.7)
MCV RBC AUTO: 88.7 FL (ref 79–97)
MESOTHL CELL NFR FLD MANUAL: 5 %
METHOD: ABNORMAL
MONOCYTES NFR FLD: 32 %
NEUTROPHILS NFR FLD MANUAL: 3 %
NUC CELL # FLD: 78 /MM3
PLATELET # BLD AUTO: 34 10*3/MM3 (ref 140–450)
PMV BLD AUTO: 9.7 FL (ref 6–12)
PROT FLD-MCNC: 1 G/DL
PROTHROMBIN TIME: 14.7 SECONDS (ref 9.6–11.7)
RBC # BLD AUTO: 3.11 10*6/MM3 (ref 4.14–5.8)
RBC # FLD AUTO: 2003 /MM3
WBC NRBC COR # BLD: 3.6 10*3/MM3 (ref 3.4–10.8)

## 2022-01-12 PROCEDURE — 85027 COMPLETE CBC AUTOMATED: CPT | Performed by: NURSE PRACTITIONER

## 2022-01-12 PROCEDURE — 96366 THER/PROPH/DIAG IV INF ADDON: CPT

## 2022-01-12 PROCEDURE — 87070 CULTURE OTHR SPECIMN AEROBIC: CPT | Performed by: NURSE PRACTITIONER

## 2022-01-12 PROCEDURE — 96365 THER/PROPH/DIAG IV INF INIT: CPT

## 2022-01-12 PROCEDURE — 89051 BODY FLUID CELL COUNT: CPT | Performed by: NURSE PRACTITIONER

## 2022-01-12 PROCEDURE — P9047 ALBUMIN (HUMAN), 25%, 50ML: HCPCS | Performed by: NURSE PRACTITIONER

## 2022-01-12 PROCEDURE — 76942 ECHO GUIDE FOR BIOPSY: CPT

## 2022-01-12 PROCEDURE — 87205 SMEAR GRAM STAIN: CPT | Performed by: NURSE PRACTITIONER

## 2022-01-12 PROCEDURE — 82042 OTHER SOURCE ALBUMIN QUAN EA: CPT | Performed by: NURSE PRACTITIONER

## 2022-01-12 PROCEDURE — 25010000002 ALBUMIN HUMAN 25% PER 50 ML: Performed by: NURSE PRACTITIONER

## 2022-01-12 PROCEDURE — 85610 PROTHROMBIN TIME: CPT | Performed by: NURSE PRACTITIONER

## 2022-01-12 PROCEDURE — 49083 ABD PARACENTESIS W/IMAGING: CPT | Performed by: INTERNAL MEDICINE

## 2022-01-12 PROCEDURE — 84157 ASSAY OF PROTEIN OTHER: CPT | Performed by: NURSE PRACTITIONER

## 2022-01-12 RX ORDER — ALBUMIN (HUMAN) 12.5 G/50ML
25 SOLUTION INTRAVENOUS DAILY PRN
Status: DISCONTINUED | OUTPATIENT
Start: 2022-01-12 | End: 2022-01-14 | Stop reason: HOSPADM

## 2022-01-12 RX ORDER — ALBUMIN (HUMAN) 12.5 G/50ML
12.5 SOLUTION INTRAVENOUS DAILY PRN
Status: DISCONTINUED | OUTPATIENT
Start: 2022-01-12 | End: 2022-01-14 | Stop reason: HOSPADM

## 2022-01-12 RX ADMIN — ALBUMIN HUMAN 25 G: 0.25 SOLUTION INTRAVENOUS at 11:00

## 2022-01-12 RX ADMIN — ALBUMIN HUMAN 12.5 G: 0.25 SOLUTION INTRAVENOUS at 11:42

## 2022-01-12 RX ADMIN — ALBUMIN HUMAN 25 G: 0.25 SOLUTION INTRAVENOUS at 10:29

## 2022-01-12 NOTE — PROCEDURES
Procedure:Ultrasound guided Paracentesis    Consent: Informed    Pre op diagnosis: Cirrhosis, Massive ascites    Post op diagnosis: Cirrhosis, Massive ascites    Anesthesia Provider: .Francisco Bowles MD    Anesthesia type: Local infiltration with 1% xylocaine    Surgeon: .Francisco Bowles MD    Assistant: none    Procedure summary:    Allergies , labs and medications were reviewed. Patient was made to lay supine and the area of interest was imaged with ultrasound and fluid verified and marked.  Area of interest was cleaned and draped in a sterile fashion. subsequently local infiltration with xylocaine. Trocar and canula were advanced. Upon verification of the fluid, trocar was steadied and canula advanced further. Trocar was drawn out. Canula was connected to extension tubing and to the vacuum bottle. Subsequently the canula was removed after completion of the procedure.     Findings: Clear straw yellow fluid was obtained. See nursing documentation for volume drained.    Lab Specimen: 40 mL sent for analysis    Complication: none    EBL: 0 ml    Post op condition: Stable. Albumin was given by protocol if needed.    Post op plan: Discharge back to the referring physician.

## 2022-01-12 NOTE — PROGRESS NOTES
PARACENTESIS    Time Arrived in Department: 0800      Consent: yes  Allergies have been Reviewed: yes      ID Band Verified: yes    Method: Ambulatory    Lab Results: Checked    Physician: Wilbur      Nurse: Lor Fernando RN    IR Care Plan: Person Educated - Patient, Current Knowledge - Understands, Learning Barrier - None, Readiness to Learn - Acceptance, Teaching Topic - Procedure and Evaluation of Teaching - Verbalized Understanding      Neurological: Within Normal Limits    Skin: Flesh, Warm and Pale    Respiratory Status: Respirations even and enlabored    Room In: 9      Procedure: Paracentesis    Time Out Completed: yes    Time Out: 1000    Prep Time: 1002    Prep Site 1: Right Lateral Abdomen      Prep: Chlorhexidine and Sterile drape    Procedure Position: Right Side    Guidance: Ultrasound    Fluid Quality: Clear and [place] Yellow    Procedure Note: Pt prepped and draped in a sterile fashion.  1% lidocaine for local anesthesia.  Extra lidocaine for further numbing. Will use 2% lidocaine for next visit.  Pt did have a low platelet count today. MD  aware pt states he has an appt with family MD soon.  Bleeding very minimal after procedure.        Intra Time 1:1020    Intra Assess 1:   LOC: Alert  Pain:  No Issues  Skin: Flesh and Warm  Respiratory Status:  Even and Unlabored  Intra Procedure Note 1: Pt draining well        Intra Time 2: 1035    Intra Assess 2:   LOC: Alert  Pain: No Issues  Skin: Flesh, Warm and Dry  Respiratory Status: Even and Unlabored  Intra Procedure Note 2: continues to drain        Intra Time 3: 1100    Intra Assess 3:   LOC: Alert  Pain: No Issues  Skin: Flesh, Warm and Dry  Respiratory Status: Even and Unlabored  Intra Procedure Note 3:para completed      Post-Procedure Position: Supine and HOB Elevated    Dressing Site 1: 2x2, 4x4 and Covaderm    Post Procedure Status:  Alert and Oriented, returned to baseline, Return to baseline, Dressing Dry/ Intact, IV documented (see flowsheet),  Stable Condition and Dressing properly labeled    Specimen To Lab: yes    Total Fluid Removed: 8.5 liters    Post Procedure Note:  Pt tolerated procedure well.  Very minimal bleeding after.  Pressure dressing applied.        Report Given To: n/a    Admit/Transfer To: n/a    Transfer Time: n/a    Discharge Time: 1210    Method: Ambulatory    O2 on Transfer: no    Accompanied By:  Family    Clothes Changed:  Self    Discharged Location:  Routine to Home    Discharge Teaching:  Care of Dressing, Follow up with MD, Home Care Instructions Sheet Given, PAtient and Significant Other/ Family

## 2022-01-17 LAB
BACTERIA FLD CULT: NORMAL
GRAM STN SPEC: NORMAL
GRAM STN SPEC: NORMAL

## 2022-01-20 ENCOUNTER — OFFICE (AMBULATORY)
Dept: URBAN - METROPOLITAN AREA PATHOLOGY 4 | Facility: PATHOLOGY | Age: 37
End: 2022-01-20

## 2022-01-20 ENCOUNTER — ON CAMPUS - OUTPATIENT (AMBULATORY)
Dept: URBAN - METROPOLITAN AREA HOSPITAL 2 | Facility: HOSPITAL | Age: 37
End: 2022-01-20

## 2022-01-20 VITALS
HEART RATE: 93 BPM | RESPIRATION RATE: 12 BRPM | HEART RATE: 97 BPM | OXYGEN SATURATION: 94 % | SYSTOLIC BLOOD PRESSURE: 124 MMHG | DIASTOLIC BLOOD PRESSURE: 77 MMHG | SYSTOLIC BLOOD PRESSURE: 132 MMHG | DIASTOLIC BLOOD PRESSURE: 69 MMHG | WEIGHT: 193 LBS | OXYGEN SATURATION: 100 % | HEART RATE: 99 BPM | DIASTOLIC BLOOD PRESSURE: 47 MMHG | SYSTOLIC BLOOD PRESSURE: 131 MMHG | DIASTOLIC BLOOD PRESSURE: 85 MMHG | DIASTOLIC BLOOD PRESSURE: 80 MMHG | SYSTOLIC BLOOD PRESSURE: 122 MMHG | HEART RATE: 94 BPM | DIASTOLIC BLOOD PRESSURE: 74 MMHG | HEIGHT: 74 IN | OXYGEN SATURATION: 99 % | SYSTOLIC BLOOD PRESSURE: 77 MMHG | TEMPERATURE: 97.8 F | SYSTOLIC BLOOD PRESSURE: 121 MMHG | RESPIRATION RATE: 16 BRPM | DIASTOLIC BLOOD PRESSURE: 79 MMHG | HEART RATE: 103 BPM

## 2022-01-20 DIAGNOSIS — K70.31 ALCOHOLIC CIRRHOSIS OF LIVER WITH ASCITES: ICD-10-CM

## 2022-01-20 DIAGNOSIS — K31.89 OTHER DISEASES OF STOMACH AND DUODENUM: ICD-10-CM

## 2022-01-20 DIAGNOSIS — I85.00 ESOPHAGEAL VARICES WITHOUT BLEEDING: ICD-10-CM

## 2022-01-20 LAB
GI HISTOLOGY: A. SELECT: (no result)
GI HISTOLOGY: PDF REPORT: (no result)

## 2022-01-20 PROCEDURE — 88305 TISSUE EXAM BY PATHOLOGIST: CPT | Performed by: INTERNAL MEDICINE

## 2022-01-20 PROCEDURE — 43239 EGD BIOPSY SINGLE/MULTIPLE: CPT | Mod: 59 | Performed by: INTERNAL MEDICINE

## 2022-01-20 PROCEDURE — 43244 EGD VARICES LIGATION: CPT | Performed by: INTERNAL MEDICINE

## 2022-01-20 RX ORDER — FUROSEMIDE 20 MG/1
60 TABLET ORAL
Qty: 30 | Refills: 2 | Status: ACTIVE

## 2022-01-20 RX ORDER — SPIRONOLACTONE 50 MG/1
50 TABLET, FILM COATED ORAL
Qty: 30 | Refills: 6 | Status: COMPLETED
End: 2022-02-16

## 2022-03-15 ENCOUNTER — OFFICE (AMBULATORY)
Dept: URBAN - METROPOLITAN AREA CLINIC 64 | Facility: CLINIC | Age: 37
End: 2022-03-15

## 2022-03-15 VITALS
HEIGHT: 74 IN | SYSTOLIC BLOOD PRESSURE: 102 MMHG | WEIGHT: 181 LBS | DIASTOLIC BLOOD PRESSURE: 68 MMHG | HEART RATE: 93 BPM

## 2022-03-15 DIAGNOSIS — K70.30 ALCOHOLIC CIRRHOSIS OF LIVER WITHOUT ASCITES: ICD-10-CM

## 2022-03-15 PROCEDURE — 99214 OFFICE O/P EST MOD 30 MIN: CPT | Performed by: NURSE PRACTITIONER

## 2022-04-08 ENCOUNTER — TRANSCRIBE ORDERS (OUTPATIENT)
Dept: ADMINISTRATIVE | Facility: HOSPITAL | Age: 37
End: 2022-04-08

## 2022-04-08 ENCOUNTER — LAB (OUTPATIENT)
Dept: LAB | Facility: HOSPITAL | Age: 37
End: 2022-04-08

## 2022-04-08 DIAGNOSIS — K70.30 CIRRHOSIS, LAENNEC'S: Primary | ICD-10-CM

## 2022-04-08 DIAGNOSIS — K70.30 CIRRHOSIS, LAENNEC'S: ICD-10-CM

## 2022-04-08 LAB
ALBUMIN SERPL-MCNC: 3.7 G/DL (ref 3.5–5.2)
ALBUMIN/GLOB SERPL: 0.9 G/DL
ALP SERPL-CCNC: 119 U/L (ref 39–117)
ALT SERPL W P-5'-P-CCNC: 32 U/L (ref 1–41)
ANION GAP SERPL CALCULATED.3IONS-SCNC: 8.5 MMOL/L (ref 5–15)
AST SERPL-CCNC: 44 U/L (ref 1–40)
BASOPHILS # BLD AUTO: 0.02 10*3/MM3 (ref 0–0.2)
BASOPHILS NFR BLD AUTO: 0.6 % (ref 0–1.5)
BILIRUB SERPL-MCNC: 1.7 MG/DL (ref 0–1.2)
BUN SERPL-MCNC: 26 MG/DL (ref 6–20)
BUN/CREAT SERPL: 15.4 (ref 7–25)
CALCIUM SPEC-SCNC: 9.2 MG/DL (ref 8.6–10.5)
CHLORIDE SERPL-SCNC: 103 MMOL/L (ref 98–107)
CO2 SERPL-SCNC: 20.5 MMOL/L (ref 22–29)
CREAT SERPL-MCNC: 1.69 MG/DL (ref 0.76–1.27)
DEPRECATED RDW RBC AUTO: 48.1 FL (ref 37–54)
EGFRCR SERPLBLD CKD-EPI 2021: 53.6 ML/MIN/1.73
EOSINOPHIL # BLD AUTO: 0.18 10*3/MM3 (ref 0–0.4)
EOSINOPHIL NFR BLD AUTO: 5.4 % (ref 0.3–6.2)
ERYTHROCYTE [DISTWIDTH] IN BLOOD BY AUTOMATED COUNT: 14.1 % (ref 12.3–15.4)
GLOBULIN UR ELPH-MCNC: 3.9 GM/DL
GLUCOSE SERPL-MCNC: 80 MG/DL (ref 65–99)
HCT VFR BLD AUTO: 29 % (ref 37.5–51)
HGB BLD-MCNC: 9.8 G/DL (ref 13–17.7)
IMM GRANULOCYTES # BLD AUTO: 0.01 10*3/MM3 (ref 0–0.05)
IMM GRANULOCYTES NFR BLD AUTO: 0.3 % (ref 0–0.5)
INR PPP: 1.38 (ref 0.93–1.1)
LYMPHOCYTES # BLD AUTO: 0.56 10*3/MM3 (ref 0.7–3.1)
LYMPHOCYTES NFR BLD AUTO: 16.7 % (ref 19.6–45.3)
MCH RBC QN AUTO: 31.8 PG (ref 26.6–33)
MCHC RBC AUTO-ENTMCNC: 33.8 G/DL (ref 31.5–35.7)
MCV RBC AUTO: 94.2 FL (ref 79–97)
MONOCYTES # BLD AUTO: 0.41 10*3/MM3 (ref 0.1–0.9)
MONOCYTES NFR BLD AUTO: 12.2 % (ref 5–12)
NEUTROPHILS NFR BLD AUTO: 2.17 10*3/MM3 (ref 1.7–7)
NEUTROPHILS NFR BLD AUTO: 64.8 % (ref 42.7–76)
NRBC BLD AUTO-RTO: 0.3 /100 WBC (ref 0–0.2)
PLATELET # BLD AUTO: 43 10*3/MM3 (ref 140–450)
PMV BLD AUTO: 10.2 FL (ref 6–12)
POTASSIUM SERPL-SCNC: 4.8 MMOL/L (ref 3.5–5.2)
PROT SERPL-MCNC: 7.6 G/DL (ref 6–8.5)
PROTHROMBIN TIME: 14 SECONDS (ref 9.6–11.7)
RBC # BLD AUTO: 3.08 10*6/MM3 (ref 4.14–5.8)
SODIUM SERPL-SCNC: 132 MMOL/L (ref 136–145)
WBC NRBC COR # BLD: 3.35 10*3/MM3 (ref 3.4–10.8)

## 2022-04-08 PROCEDURE — 85025 COMPLETE CBC W/AUTO DIFF WBC: CPT

## 2022-04-08 PROCEDURE — 36415 COLL VENOUS BLD VENIPUNCTURE: CPT

## 2022-04-08 PROCEDURE — 85610 PROTHROMBIN TIME: CPT

## 2022-04-08 PROCEDURE — 80053 COMPREHEN METABOLIC PANEL: CPT

## 2022-05-10 ENCOUNTER — LAB (OUTPATIENT)
Dept: LAB | Facility: HOSPITAL | Age: 37
End: 2022-05-10

## 2022-05-10 DIAGNOSIS — K70.30 CIRRHOSIS, LAENNEC'S: ICD-10-CM

## 2022-05-10 LAB
ALBUMIN SERPL-MCNC: 3.1 G/DL (ref 3.5–5.2)
ALBUMIN/GLOB SERPL: 0.8 G/DL
ALP SERPL-CCNC: 139 U/L (ref 39–117)
ALT SERPL W P-5'-P-CCNC: 30 U/L (ref 1–41)
ANION GAP SERPL CALCULATED.3IONS-SCNC: 9.7 MMOL/L (ref 5–15)
AST SERPL-CCNC: 46 U/L (ref 1–40)
BILIRUB SERPL-MCNC: 1.3 MG/DL (ref 0–1.2)
BUN SERPL-MCNC: 23 MG/DL (ref 6–20)
BUN/CREAT SERPL: 14.3 (ref 7–25)
CALCIUM SPEC-SCNC: 8.6 MG/DL (ref 8.6–10.5)
CHLORIDE SERPL-SCNC: 104 MMOL/L (ref 98–107)
CO2 SERPL-SCNC: 19.3 MMOL/L (ref 22–29)
CREAT SERPL-MCNC: 1.61 MG/DL (ref 0.76–1.27)
DEPRECATED RDW RBC AUTO: 45.8 FL (ref 37–54)
EGFRCR SERPLBLD CKD-EPI 2021: 56.8 ML/MIN/1.73
EOSINOPHIL # BLD MANUAL: 0.15 10*3/MM3 (ref 0–0.4)
EOSINOPHIL NFR BLD MANUAL: 6.1 % (ref 0.3–6.2)
ERYTHROCYTE [DISTWIDTH] IN BLOOD BY AUTOMATED COUNT: 14 % (ref 12.3–15.4)
ETHANOL UR QL: <0.01 %
GLOBULIN UR ELPH-MCNC: 3.8 GM/DL
GLUCOSE SERPL-MCNC: 87 MG/DL (ref 65–99)
HCT VFR BLD AUTO: 27.2 % (ref 37.5–51)
HGB BLD-MCNC: 9.8 G/DL (ref 13–17.7)
INR PPP: 1.37 (ref 0.93–1.1)
LYMPHOCYTES # BLD MANUAL: 0.29 10*3/MM3 (ref 0.7–3.1)
LYMPHOCYTES NFR BLD MANUAL: 6.1 % (ref 5–12)
MCH RBC QN AUTO: 32.6 PG (ref 26.6–33)
MCHC RBC AUTO-ENTMCNC: 36 G/DL (ref 31.5–35.7)
MCV RBC AUTO: 90.4 FL (ref 79–97)
MONOCYTES # BLD: 0.15 10*3/MM3 (ref 0.1–0.9)
NEUTROPHILS # BLD AUTO: 1.82 10*3/MM3 (ref 1.7–7)
NEUTROPHILS NFR BLD MANUAL: 75.5 % (ref 42.7–76)
OVALOCYTES BLD QL SMEAR: ABNORMAL
PLAT MORPH BLD: NORMAL
PLATELET # BLD AUTO: 39 10*3/MM3 (ref 140–450)
PMV BLD AUTO: 10.3 FL (ref 6–12)
POIKILOCYTOSIS BLD QL SMEAR: ABNORMAL
POTASSIUM SERPL-SCNC: 4.7 MMOL/L (ref 3.5–5.2)
PROT SERPL-MCNC: 6.9 G/DL (ref 6–8.5)
PROTHROMBIN TIME: 13.9 SECONDS (ref 9.6–11.7)
RBC # BLD AUTO: 3.01 10*6/MM3 (ref 4.14–5.8)
SODIUM SERPL-SCNC: 133 MMOL/L (ref 136–145)
VARIANT LYMPHS NFR BLD MANUAL: 12.2 % (ref 19.6–45.3)
WBC MORPH BLD: NORMAL
WBC NRBC COR # BLD: 2.41 10*3/MM3 (ref 3.4–10.8)

## 2022-05-10 PROCEDURE — 85007 BL SMEAR W/DIFF WBC COUNT: CPT

## 2022-05-10 PROCEDURE — 82077 ASSAY SPEC XCP UR&BREATH IA: CPT

## 2022-05-10 PROCEDURE — 85610 PROTHROMBIN TIME: CPT

## 2022-05-10 PROCEDURE — 36415 COLL VENOUS BLD VENIPUNCTURE: CPT

## 2022-05-10 PROCEDURE — 80053 COMPREHEN METABOLIC PANEL: CPT

## 2022-05-10 PROCEDURE — 85025 COMPLETE CBC W/AUTO DIFF WBC: CPT

## 2022-06-07 ENCOUNTER — LAB (OUTPATIENT)
Dept: LAB | Facility: HOSPITAL | Age: 37
End: 2022-06-07

## 2022-06-07 DIAGNOSIS — K70.30 CIRRHOSIS, LAENNEC'S: ICD-10-CM

## 2022-06-07 LAB
ALBUMIN SERPL-MCNC: 3.6 G/DL (ref 3.5–5.2)
ALBUMIN/GLOB SERPL: 1.1 G/DL
ALP SERPL-CCNC: 150 U/L (ref 39–117)
ALT SERPL W P-5'-P-CCNC: 33 U/L (ref 1–41)
ANION GAP SERPL CALCULATED.3IONS-SCNC: 7 MMOL/L (ref 5–15)
AST SERPL-CCNC: 43 U/L (ref 1–40)
BASOPHILS # BLD MANUAL: 0.05 10*3/MM3 (ref 0–0.2)
BASOPHILS NFR BLD MANUAL: 1 % (ref 0–1.5)
BILIRUB SERPL-MCNC: 1.7 MG/DL (ref 0–1.2)
BUN SERPL-MCNC: 26 MG/DL (ref 6–20)
BUN/CREAT SERPL: 15.7 (ref 7–25)
CALCIUM SPEC-SCNC: 8.6 MG/DL (ref 8.6–10.5)
CHLORIDE SERPL-SCNC: 103 MMOL/L (ref 98–107)
CO2 SERPL-SCNC: 20 MMOL/L (ref 22–29)
CREAT SERPL-MCNC: 1.66 MG/DL (ref 0.76–1.27)
DEPRECATED RDW RBC AUTO: 45.2 FL (ref 37–54)
EGFRCR SERPLBLD CKD-EPI 2021: 54.8 ML/MIN/1.73
EOSINOPHIL # BLD MANUAL: 0.05 10*3/MM3 (ref 0–0.4)
EOSINOPHIL NFR BLD MANUAL: 1 % (ref 0.3–6.2)
ERYTHROCYTE [DISTWIDTH] IN BLOOD BY AUTOMATED COUNT: 13.5 % (ref 12.3–15.4)
ETHANOL UR QL: <0.01 %
GLOBULIN UR ELPH-MCNC: 3.2 GM/DL
GLUCOSE SERPL-MCNC: 95 MG/DL (ref 65–99)
HCT VFR BLD AUTO: 30 % (ref 37.5–51)
HGB BLD-MCNC: 10.1 G/DL (ref 13–17.7)
INR PPP: 1.32 (ref 0.93–1.1)
LYMPHOCYTES # BLD MANUAL: 0.17 10*3/MM3 (ref 0.7–3.1)
LYMPHOCYTES NFR BLD MANUAL: 8.2 % (ref 5–12)
MCH RBC QN AUTO: 31.2 PG (ref 26.6–33)
MCHC RBC AUTO-ENTMCNC: 33.7 G/DL (ref 31.5–35.7)
MCV RBC AUTO: 92.6 FL (ref 79–97)
MONOCYTES # BLD: 0.45 10*3/MM3 (ref 0.1–0.9)
NEUTROPHILS # BLD AUTO: 4.71 10*3/MM3 (ref 1.7–7)
NEUTROPHILS NFR BLD MANUAL: 86.6 % (ref 42.7–76)
PLAT MORPH BLD: NORMAL
PLATELET # BLD AUTO: 48 10*3/MM3 (ref 140–450)
PMV BLD AUTO: 9.3 FL (ref 6–12)
POTASSIUM SERPL-SCNC: 4.9 MMOL/L (ref 3.5–5.2)
PROT SERPL-MCNC: 6.8 G/DL (ref 6–8.5)
PROTHROMBIN TIME: 13.4 SECONDS (ref 9.6–11.7)
RBC # BLD AUTO: 3.24 10*6/MM3 (ref 4.14–5.8)
RBC MORPH BLD: NORMAL
SODIUM SERPL-SCNC: 130 MMOL/L (ref 136–145)
VARIANT LYMPHS NFR BLD MANUAL: 3.1 % (ref 19.6–45.3)
WBC MORPH BLD: NORMAL
WBC NRBC COR # BLD: 5.44 10*3/MM3 (ref 3.4–10.8)

## 2022-06-07 PROCEDURE — 85007 BL SMEAR W/DIFF WBC COUNT: CPT

## 2022-06-07 PROCEDURE — 36415 COLL VENOUS BLD VENIPUNCTURE: CPT

## 2022-06-07 PROCEDURE — 85610 PROTHROMBIN TIME: CPT

## 2022-06-07 PROCEDURE — 80053 COMPREHEN METABOLIC PANEL: CPT

## 2022-06-07 PROCEDURE — 85025 COMPLETE CBC W/AUTO DIFF WBC: CPT

## 2022-06-07 PROCEDURE — 82077 ASSAY SPEC XCP UR&BREATH IA: CPT

## 2022-06-14 ENCOUNTER — OFFICE (AMBULATORY)
Dept: URBAN - METROPOLITAN AREA CLINIC 64 | Facility: CLINIC | Age: 37
End: 2022-06-14
Payer: COMMERCIAL

## 2022-06-14 ENCOUNTER — TRANSCRIBE ORDERS (OUTPATIENT)
Dept: ADMINISTRATIVE | Facility: HOSPITAL | Age: 37
End: 2022-06-14

## 2022-06-14 VITALS
DIASTOLIC BLOOD PRESSURE: 78 MMHG | WEIGHT: 176 LBS | SYSTOLIC BLOOD PRESSURE: 111 MMHG | HEIGHT: 74 IN | HEART RATE: 103 BPM

## 2022-06-14 DIAGNOSIS — K70.30 ALCOHOLIC CIRRHOSIS OF LIVER WITHOUT ASCITES: Primary | ICD-10-CM

## 2022-06-14 DIAGNOSIS — K74.69 OTHER CIRRHOSIS OF LIVER: ICD-10-CM

## 2022-06-14 PROCEDURE — 99214 OFFICE O/P EST MOD 30 MIN: CPT | Performed by: INTERNAL MEDICINE

## 2022-06-14 NOTE — SERVICEHPINOTES
Patient is a pleasant 36-year-old male with history of EtOH abuse and cirrhosis complicated by ascites an nonbleeding esophageal varices who presents today for follow-up.
He reports he has been doing well since last visit. He denies any alcohol use for about 1 year now.  Denies abdominal pain or abdominal distention.  He follows a low sodium diet at home. He is prescribed Lasix 20 mg daily, but states he alternates taking 20 mg one day and 10 mg the next day. He is also taking spironolactone 50 mg daily. No BLE edema. No nausea/vomiting, heartburn, or dysphagia.  He states that he has been moving his bowels regularly 2-3 times daily.  Denies any bright red blood per rectum or melena.  Denies any slowed thinking or confusion recently. He is taking lactulose 30 mL daily and knows to titrate to a goal of 2-3 bms a day. br
His last paracentesis was during his hospitalization at [facility] in 2/2022. He is currently following with [facility] transplant team. padmini Hudson note, he has started the hepatitis A and B vaccine series and has received 2 out of 3 injections. 
br
padmini He recently underwent an umbilical hernia repair 5/25/22. He is recovering well. He has a follow up this week with the surgeon. padmini [place]Na= 22 on 6/7/22 (labs at [facility]). Cr 1.66, Na 130, Bili 1.7, INR 1.32br br2/2022 MRCP - cirrhosis with stigmata of portal hypertension, large volume of ascitesbr9/2021 RUQ US - cirrhosis, no focal liver lesion, large volume of ascitesbr6/2021 RUQ US - cirrhosis, large amount of ascitesbr6/2021 CT abdomen/pelvis WO - cirrhosis with splenomegaly and a large volume of ascites with marked anasarca1/2022 EGD (Dr. Regan) - 3 cords of grade 3 esophageal varices status post banding ×2, portal hypertensive gastropathy padmini

## 2022-06-21 ENCOUNTER — HOSPITAL ENCOUNTER (OUTPATIENT)
Dept: ULTRASOUND IMAGING | Facility: HOSPITAL | Age: 37
Discharge: HOME | End: 2022-06-21
Admitting: INTERNAL MEDICINE

## 2022-06-21 DIAGNOSIS — K70.30 ALCOHOLIC CIRRHOSIS OF LIVER WITHOUT ASCITES: ICD-10-CM

## 2022-06-21 PROCEDURE — 76705 ECHO EXAM OF ABDOMEN: CPT

## 2022-07-08 ENCOUNTER — LAB (OUTPATIENT)
Dept: LAB | Facility: HOSPITAL | Age: 37
End: 2022-07-08

## 2022-07-08 DIAGNOSIS — K70.30 CIRRHOSIS, LAENNEC'S: ICD-10-CM

## 2022-07-08 LAB
ALBUMIN SERPL-MCNC: 3.6 G/DL (ref 3.5–5.2)
ALBUMIN/GLOB SERPL: 1.1 G/DL
ALP SERPL-CCNC: 136 U/L (ref 39–117)
ALT SERPL W P-5'-P-CCNC: 28 U/L (ref 1–41)
ANION GAP SERPL CALCULATED.3IONS-SCNC: 9 MMOL/L (ref 5–15)
AST SERPL-CCNC: 38 U/L (ref 1–40)
BASOPHILS # BLD AUTO: 0.01 10*3/MM3 (ref 0–0.2)
BASOPHILS NFR BLD AUTO: 0.3 % (ref 0–1.5)
BILIRUB SERPL-MCNC: 1.6 MG/DL (ref 0–1.2)
BUN SERPL-MCNC: 27 MG/DL (ref 6–20)
BUN/CREAT SERPL: 15.1 (ref 7–25)
CALCIUM SPEC-SCNC: 8.2 MG/DL (ref 8.6–10.5)
CHLORIDE SERPL-SCNC: 103 MMOL/L (ref 98–107)
CO2 SERPL-SCNC: 23 MMOL/L (ref 22–29)
CREAT SERPL-MCNC: 1.79 MG/DL (ref 0.76–1.27)
DEPRECATED RDW RBC AUTO: 44.8 FL (ref 37–54)
EGFRCR SERPLBLD CKD-EPI 2021: 50.1 ML/MIN/1.73
EOSINOPHIL # BLD AUTO: 0.24 10*3/MM3 (ref 0–0.4)
EOSINOPHIL NFR BLD AUTO: 6.8 % (ref 0.3–6.2)
ERYTHROCYTE [DISTWIDTH] IN BLOOD BY AUTOMATED COUNT: 13.6 % (ref 12.3–15.4)
ETHANOL UR QL: <0.01 %
GLOBULIN UR ELPH-MCNC: 3.3 GM/DL
GLUCOSE SERPL-MCNC: 93 MG/DL (ref 65–99)
HCT VFR BLD AUTO: 29.1 % (ref 37.5–51)
HGB BLD-MCNC: 10.1 G/DL (ref 13–17.7)
INR PPP: 1.34 (ref 0.93–1.1)
LYMPHOCYTES # BLD AUTO: 0.51 10*3/MM3 (ref 0.7–3.1)
LYMPHOCYTES NFR BLD AUTO: 14.4 % (ref 19.6–45.3)
MCH RBC QN AUTO: 31.8 PG (ref 26.6–33)
MCHC RBC AUTO-ENTMCNC: 34.7 G/DL (ref 31.5–35.7)
MCV RBC AUTO: 91.5 FL (ref 79–97)
MONOCYTES # BLD AUTO: 0.47 10*3/MM3 (ref 0.1–0.9)
MONOCYTES NFR BLD AUTO: 13.3 % (ref 5–12)
NEUTROPHILS NFR BLD AUTO: 2.3 10*3/MM3 (ref 1.7–7)
NEUTROPHILS NFR BLD AUTO: 64.9 % (ref 42.7–76)
PLATELET # BLD AUTO: 44 10*3/MM3 (ref 140–450)
PMV BLD AUTO: 9.7 FL (ref 6–12)
POTASSIUM SERPL-SCNC: 4.3 MMOL/L (ref 3.5–5.2)
PROT SERPL-MCNC: 6.9 G/DL (ref 6–8.5)
PROTHROMBIN TIME: 13.6 SECONDS (ref 9.6–11.7)
RBC # BLD AUTO: 3.18 10*6/MM3 (ref 4.14–5.8)
SODIUM SERPL-SCNC: 135 MMOL/L (ref 136–145)
WBC NRBC COR # BLD: 3.54 10*3/MM3 (ref 3.4–10.8)

## 2022-07-08 PROCEDURE — 85025 COMPLETE CBC W/AUTO DIFF WBC: CPT

## 2022-07-08 PROCEDURE — 36415 COLL VENOUS BLD VENIPUNCTURE: CPT

## 2022-07-08 PROCEDURE — 82077 ASSAY SPEC XCP UR&BREATH IA: CPT

## 2022-07-08 PROCEDURE — 85610 PROTHROMBIN TIME: CPT

## 2022-07-08 PROCEDURE — 80053 COMPREHEN METABOLIC PANEL: CPT

## 2022-07-08 RX ORDER — ALBUMIN (HUMAN) 12.5 G/50ML
12.5 SOLUTION INTRAVENOUS DAILY PRN
Status: CANCELLED | OUTPATIENT
Start: 2022-07-13

## 2022-07-08 RX ORDER — ALBUMIN (HUMAN) 12.5 G/50ML
25 SOLUTION INTRAVENOUS DAILY PRN
Status: CANCELLED | OUTPATIENT
Start: 2022-07-08

## 2022-07-11 ENCOUNTER — APPOINTMENT (OUTPATIENT)
Dept: INFUSION THERAPY | Facility: HOSPITAL | Age: 37
End: 2022-07-11

## 2022-07-13 ENCOUNTER — HOSPITAL ENCOUNTER (OUTPATIENT)
Dept: INFUSION THERAPY | Facility: HOSPITAL | Age: 37
Discharge: HOME | End: 2022-07-13
Admitting: HOSPITALIST

## 2022-07-13 VITALS
RESPIRATION RATE: 14 BRPM | OXYGEN SATURATION: 100 % | DIASTOLIC BLOOD PRESSURE: 56 MMHG | HEART RATE: 83 BPM | SYSTOLIC BLOOD PRESSURE: 100 MMHG

## 2022-07-13 DIAGNOSIS — R18.8 OTHER ASCITES: Primary | ICD-10-CM

## 2022-07-13 PROCEDURE — 49083 ABD PARACENTESIS W/IMAGING: CPT | Performed by: HOSPITALIST

## 2022-07-13 PROCEDURE — 96365 THER/PROPH/DIAG IV INF INIT: CPT

## 2022-07-13 PROCEDURE — 76942 ECHO GUIDE FOR BIOPSY: CPT

## 2022-07-13 PROCEDURE — 96366 THER/PROPH/DIAG IV INF ADDON: CPT

## 2022-07-13 PROCEDURE — 25010000002 ALBUMIN HUMAN 25% PER 50 ML: Performed by: NURSE PRACTITIONER

## 2022-07-13 PROCEDURE — P9047 ALBUMIN (HUMAN), 25%, 50ML: HCPCS | Performed by: NURSE PRACTITIONER

## 2022-07-13 RX ORDER — ALBUMIN (HUMAN) 12.5 G/50ML
12.5 SOLUTION INTRAVENOUS DAILY PRN
Status: DISCONTINUED | OUTPATIENT
Start: 2022-07-13 | End: 2022-07-15 | Stop reason: HOSPADM

## 2022-07-13 RX ORDER — ALBUMIN (HUMAN) 12.5 G/50ML
25 SOLUTION INTRAVENOUS DAILY PRN
Status: DISCONTINUED | OUTPATIENT
Start: 2022-07-13 | End: 2022-07-15 | Stop reason: HOSPADM

## 2022-07-13 RX ADMIN — ALBUMIN (HUMAN) 25 G: 0.25 INJECTION, SOLUTION INTRAVENOUS at 10:33

## 2022-07-13 RX ADMIN — ALBUMIN (HUMAN) 25 G: 0.25 INJECTION, SOLUTION INTRAVENOUS at 10:00

## 2022-07-13 NOTE — PROCEDURES
"Ultrasound guided paracentesis.    Date/Time: 7/13/2022 1:07 PM  Performed by: Bentley Best MD  Authorized by: Bentley Best MD   Consent: Verbal consent obtained. Written consent obtained.  Risks and benefits: risks, benefits and alternatives were discussed  Consent given by: patient  Patient understanding: patient states understanding of the procedure being performed  Patient consent: the patient's understanding of the procedure matches consent given  Procedure consent: procedure consent matches procedure scheduled  Relevant documents: relevant documents present and verified  Test results: test results available and properly labeled  Site marked: the operative site was marked  Imaging studies: imaging studies available  Required items: required blood products, implants, devices, and special equipment available  Patient identity confirmed: verbally with patient  Time out: Immediately prior to procedure a \"time out\" was called to verify the correct patient, procedure, equipment, support staff and site/side marked as required.  Procedure purpose: therapeutic  Indications: abdominal discomfort secondary to ascites  Anesthesia: local infiltration    Anesthesia:  Local Anesthetic: lidocaine 1% without epinephrine  Preparation: Patient was prepped and draped in the usual sterile fashion.  Needle gauge: 20  Ultrasound guidance: yes  Puncture site: right lower quadrant  Fluid appearance: serous  Dressing: 4x4 sterile gauze  Patient tolerance: patient tolerated the procedure well with no immediate complications  Comments: Please see nurses note for amount of fluid and albumin given.        "

## 2022-07-13 NOTE — PROGRESS NOTES
PARACENTESIS    Time Arrived in Department: 0800      Consent: yes  Allergies have been Reviewed: yes      ID Band Verified: yes    Method: Ambulatory    Lab Results: Checked and MD Notified     Physician: Dr. ROJELIO Best      Nurse: Yanely Ahmadi RN    IR Care Plan: Person Educated - Patient, Current Knowledge - Understands, Learning Barrier - None, Readiness to Learn - Acceptance, Teaching Topic - Procedure and Evaluation of Teaching - Verbalized Understanding      Neurological: Within Normal Limits    Skin: Warm, Dry and Jaundice    Respiratory Status: Respirations even and unlabored    Room In: Ambulatory Dept Room 8      Procedure: Paracentesis    Time Out Completed: yes    Time Out: 0906    Prep Site 1: Right Lateral Abdomen      Prep: Chlorhexidine and Sterile drape    Procedure Position: Right Side and Semi Fowlers    Guidance: Ultrasound    Fluid Quality: Cloudy and Tiara    Procedure Note: Procedure start at 0910. 2% lidocaine with epi used. No complications noted.        Intra Time 1:0930    Intra Assess 1:   LOC: Alert  Pain:  No Issues and Tolerating  Skin: Warm, Dry and Jaundice  Respiratory Status:  Even and Unlabored  Intra Procedure Note 1:         Intra Time 2: 1000    Intra Assess 2:   LOC: Alert  Pain: No Issues and Tolerating  Skin: Warm, Dry and Jaundice  Respiratory Status: Even and Unlabored  Intra Procedure Note 2:         Intra Time 3: 1030    Intra Assess 3:   LOC: Alert  Pain: No Issues and Tolerating  Skin: Warm, Dry and Jaundice  Respiratory Status: Even and Unlabored  Intra Procedure Note 3:       Intra Time 4: 1100    Intra Assess 4:   LOC: Alert  Pain: No Issues and Tolerating  Skin: Warm, Dry and Jaundice  Respiratory Status: Even and Unlabored  Intra Procedure Note 4: para completed          Post-Procedure Position: Supine and HOB Elevated    Dressing Site 1: 2x2, 4x4 and Tegaderm    Post Procedure Status:  Alert and Oriented, returned to baseline, Dressing Dry/ Intact, IV  documented (see flowsheet), Stable Condition and Dressing properly labeled    Specimen To Lab: no    Total Fluid Removed: 6.4 liters    Post Procedure Note:  Procedure complete at 1100. 50 grams of albumin given per MD order. No complications noted.        Report Given To:      Admit/Transfer To:      Transfer Time:      Discharge Time: 1120    Method: Ambulatory    O2 on Transfer:      Accompanied By:  Mother    Clothes Changed:  Self    Discharged Location:  Routine to Home    Discharge Teaching:  Inpatient and Follow up with MD

## 2022-08-09 ENCOUNTER — LAB (OUTPATIENT)
Dept: LAB | Facility: HOSPITAL | Age: 37
End: 2022-08-09

## 2022-08-09 DIAGNOSIS — K70.30 CIRRHOSIS, LAENNEC'S: ICD-10-CM

## 2022-08-09 LAB
ALBUMIN SERPL-MCNC: 3.6 G/DL (ref 3.5–5.2)
ALBUMIN/GLOB SERPL: 1 G/DL
ALP SERPL-CCNC: 132 U/L (ref 39–117)
ALT SERPL W P-5'-P-CCNC: 33 U/L (ref 1–41)
ANION GAP SERPL CALCULATED.3IONS-SCNC: 6.5 MMOL/L (ref 5–15)
AST SERPL-CCNC: 56 U/L (ref 1–40)
BASOPHILS # BLD AUTO: 0.02 10*3/MM3 (ref 0–0.2)
BASOPHILS NFR BLD AUTO: 0.6 % (ref 0–1.5)
BILIRUB SERPL-MCNC: 1.6 MG/DL (ref 0–1.2)
BUN SERPL-MCNC: 28 MG/DL (ref 6–20)
BUN/CREAT SERPL: 15 (ref 7–25)
CALCIUM SPEC-SCNC: 8.5 MG/DL (ref 8.6–10.5)
CHLORIDE SERPL-SCNC: 103 MMOL/L (ref 98–107)
CO2 SERPL-SCNC: 19.5 MMOL/L (ref 22–29)
CREAT SERPL-MCNC: 1.87 MG/DL (ref 0.76–1.27)
DEPRECATED RDW RBC AUTO: 47.4 FL (ref 37–54)
EGFRCR SERPLBLD CKD-EPI 2021: 47.5 ML/MIN/1.73
EOSINOPHIL # BLD AUTO: 0.17 10*3/MM3 (ref 0–0.4)
EOSINOPHIL NFR BLD AUTO: 4.7 % (ref 0.3–6.2)
ERYTHROCYTE [DISTWIDTH] IN BLOOD BY AUTOMATED COUNT: 14.6 % (ref 12.3–15.4)
ETHANOL UR QL: <0.01 %
GLOBULIN UR ELPH-MCNC: 3.5 GM/DL
GLUCOSE SERPL-MCNC: 88 MG/DL (ref 65–99)
HCT VFR BLD AUTO: 26.3 % (ref 37.5–51)
HGB BLD-MCNC: 9.4 G/DL (ref 13–17.7)
INR PPP: 1.33 (ref 0.93–1.1)
LYMPHOCYTES # BLD AUTO: 0.55 10*3/MM3 (ref 0.7–3.1)
LYMPHOCYTES NFR BLD AUTO: 15.2 % (ref 19.6–45.3)
MCH RBC QN AUTO: 32.1 PG (ref 26.6–33)
MCHC RBC AUTO-ENTMCNC: 35.7 G/DL (ref 31.5–35.7)
MCV RBC AUTO: 89.8 FL (ref 79–97)
MONOCYTES # BLD AUTO: 0.52 10*3/MM3 (ref 0.1–0.9)
MONOCYTES NFR BLD AUTO: 14.3 % (ref 5–12)
NEUTROPHILS NFR BLD AUTO: 2.36 10*3/MM3 (ref 1.7–7)
NEUTROPHILS NFR BLD AUTO: 64.9 % (ref 42.7–76)
PLATELET # BLD AUTO: 38 10*3/MM3 (ref 140–450)
PMV BLD AUTO: 9.2 FL (ref 6–12)
POTASSIUM SERPL-SCNC: 4.9 MMOL/L (ref 3.5–5.2)
PROT SERPL-MCNC: 7.1 G/DL (ref 6–8.5)
PROTHROMBIN TIME: 13.5 SECONDS (ref 9.6–11.7)
RBC # BLD AUTO: 2.93 10*6/MM3 (ref 4.14–5.8)
SODIUM SERPL-SCNC: 129 MMOL/L (ref 136–145)
WBC NRBC COR # BLD: 3.63 10*3/MM3 (ref 3.4–10.8)

## 2022-08-09 PROCEDURE — 82077 ASSAY SPEC XCP UR&BREATH IA: CPT

## 2022-08-09 PROCEDURE — 80053 COMPREHEN METABOLIC PANEL: CPT

## 2022-08-09 PROCEDURE — 85025 COMPLETE CBC W/AUTO DIFF WBC: CPT

## 2022-08-09 PROCEDURE — 85610 PROTHROMBIN TIME: CPT

## 2022-08-09 PROCEDURE — 36415 COLL VENOUS BLD VENIPUNCTURE: CPT

## 2022-08-16 ENCOUNTER — LAB (OUTPATIENT)
Dept: LAB | Facility: HOSPITAL | Age: 37
End: 2022-08-16

## 2022-08-16 ENCOUNTER — TRANSCRIBE ORDERS (OUTPATIENT)
Dept: ADMINISTRATIVE | Facility: HOSPITAL | Age: 37
End: 2022-08-16

## 2022-08-16 DIAGNOSIS — K70.30 CIRRHOSIS, LAENNEC'S: ICD-10-CM

## 2022-08-16 DIAGNOSIS — K70.31 ALCOHOLIC CIRRHOSIS OF LIVER WITH ASCITES: Primary | ICD-10-CM

## 2022-08-16 LAB
ALBUMIN SERPL-MCNC: 3.4 G/DL (ref 3.5–5.2)
ALBUMIN/GLOB SERPL: 1 G/DL
ALP SERPL-CCNC: 133 U/L (ref 39–117)
ALT SERPL W P-5'-P-CCNC: 34 U/L (ref 1–41)
ANION GAP SERPL CALCULATED.3IONS-SCNC: 7.4 MMOL/L (ref 5–15)
AST SERPL-CCNC: 46 U/L (ref 1–40)
BASOPHILS # BLD AUTO: 0.02 10*3/MM3 (ref 0–0.2)
BASOPHILS NFR BLD AUTO: 0.6 % (ref 0–1.5)
BILIRUB SERPL-MCNC: 1.6 MG/DL (ref 0–1.2)
BUN SERPL-MCNC: 26 MG/DL (ref 6–20)
BUN/CREAT SERPL: 14.6 (ref 7–25)
CALCIUM SPEC-SCNC: 8.9 MG/DL (ref 8.6–10.5)
CHLORIDE SERPL-SCNC: 103 MMOL/L (ref 98–107)
CO2 SERPL-SCNC: 21.6 MMOL/L (ref 22–29)
CREAT SERPL-MCNC: 1.78 MG/DL (ref 0.76–1.27)
DEPRECATED RDW RBC AUTO: 48.6 FL (ref 37–54)
EGFRCR SERPLBLD CKD-EPI 2021: 50.4 ML/MIN/1.73
EOSINOPHIL # BLD AUTO: 0.17 10*3/MM3 (ref 0–0.4)
EOSINOPHIL NFR BLD AUTO: 5 % (ref 0.3–6.2)
ERYTHROCYTE [DISTWIDTH] IN BLOOD BY AUTOMATED COUNT: 14.6 % (ref 12.3–15.4)
ETHANOL UR QL: <0.01 %
GLOBULIN UR ELPH-MCNC: 3.4 GM/DL
GLUCOSE SERPL-MCNC: 89 MG/DL (ref 65–99)
HCT VFR BLD AUTO: 27.5 % (ref 37.5–51)
HGB BLD-MCNC: 9.5 G/DL (ref 13–17.7)
INR PPP: 1.28 (ref 0.93–1.1)
LYMPHOCYTES # BLD AUTO: 0.45 10*3/MM3 (ref 0.7–3.1)
LYMPHOCYTES NFR BLD AUTO: 13.3 % (ref 19.6–45.3)
MCH RBC QN AUTO: 31.8 PG (ref 26.6–33)
MCHC RBC AUTO-ENTMCNC: 34.5 G/DL (ref 31.5–35.7)
MCV RBC AUTO: 92 FL (ref 79–97)
MONOCYTES # BLD AUTO: 0.41 10*3/MM3 (ref 0.1–0.9)
MONOCYTES NFR BLD AUTO: 12.1 % (ref 5–12)
NEUTROPHILS NFR BLD AUTO: 2.32 10*3/MM3 (ref 1.7–7)
NEUTROPHILS NFR BLD AUTO: 68.7 % (ref 42.7–76)
PLATELET # BLD AUTO: 42 10*3/MM3 (ref 140–450)
PMV BLD AUTO: 9.7 FL (ref 6–12)
POTASSIUM SERPL-SCNC: 4.8 MMOL/L (ref 3.5–5.2)
PROT SERPL-MCNC: 6.8 G/DL (ref 6–8.5)
PROTHROMBIN TIME: 13 SECONDS (ref 9.6–11.7)
RBC # BLD AUTO: 2.99 10*6/MM3 (ref 4.14–5.8)
SODIUM SERPL-SCNC: 132 MMOL/L (ref 136–145)
WBC NRBC COR # BLD: 3.38 10*3/MM3 (ref 3.4–10.8)

## 2022-08-16 PROCEDURE — 80053 COMPREHEN METABOLIC PANEL: CPT

## 2022-08-16 PROCEDURE — 85025 COMPLETE CBC W/AUTO DIFF WBC: CPT

## 2022-08-16 PROCEDURE — 36415 COLL VENOUS BLD VENIPUNCTURE: CPT

## 2022-08-16 PROCEDURE — 85610 PROTHROMBIN TIME: CPT

## 2022-08-16 PROCEDURE — 82077 ASSAY SPEC XCP UR&BREATH IA: CPT

## 2022-08-19 RX ORDER — ALBUMIN (HUMAN) 12.5 G/50ML
25 SOLUTION INTRAVENOUS DAILY PRN
Status: CANCELLED | OUTPATIENT
Start: 2022-08-23

## 2022-08-19 RX ORDER — ALBUMIN (HUMAN) 12.5 G/50ML
12.5 SOLUTION INTRAVENOUS DAILY PRN
Status: CANCELLED | OUTPATIENT
Start: 2022-08-23

## 2022-08-23 ENCOUNTER — HOSPITAL ENCOUNTER (OUTPATIENT)
Dept: INFUSION THERAPY | Facility: HOSPITAL | Age: 37
Discharge: HOME | End: 2022-08-23
Admitting: INTERNAL MEDICINE

## 2022-08-23 VITALS
RESPIRATION RATE: 13 BRPM | HEART RATE: 82 BPM | DIASTOLIC BLOOD PRESSURE: 57 MMHG | SYSTOLIC BLOOD PRESSURE: 91 MMHG | OXYGEN SATURATION: 100 %

## 2022-08-23 DIAGNOSIS — K70.30 CIRRHOSIS, LAENNEC'S: ICD-10-CM

## 2022-08-23 LAB
ALBUMIN SERPL-MCNC: 3.3 G/DL (ref 3.5–5.2)
ALBUMIN/GLOB SERPL: 1 G/DL
ALP SERPL-CCNC: 134 U/L (ref 39–117)
ALT SERPL W P-5'-P-CCNC: 29 U/L (ref 1–41)
ANION GAP SERPL CALCULATED.3IONS-SCNC: 7 MMOL/L (ref 5–15)
ANISOCYTOSIS BLD QL: NORMAL
AST SERPL-CCNC: 41 U/L (ref 1–40)
BASOPHILS # BLD AUTO: 0 10*3/MM3 (ref 0–0.2)
BASOPHILS NFR BLD AUTO: 0.5 % (ref 0–1.5)
BILIRUB SERPL-MCNC: 1.5 MG/DL (ref 0–1.2)
BUN SERPL-MCNC: 28 MG/DL (ref 6–20)
BUN/CREAT SERPL: 16.1 (ref 7–25)
CALCIUM SPEC-SCNC: 8.7 MG/DL (ref 8.6–10.5)
CHLORIDE SERPL-SCNC: 102 MMOL/L (ref 98–107)
CO2 SERPL-SCNC: 23 MMOL/L (ref 22–29)
CREAT SERPL-MCNC: 1.74 MG/DL (ref 0.76–1.27)
DEPRECATED RDW RBC AUTO: 54.3 FL (ref 37–54)
EGFRCR SERPLBLD CKD-EPI 2021: 51.8 ML/MIN/1.73
EOSINOPHIL # BLD AUTO: 0.2 10*3/MM3 (ref 0–0.4)
EOSINOPHIL NFR BLD AUTO: 5.1 % (ref 0.3–6.2)
ERYTHROCYTE [DISTWIDTH] IN BLOOD BY AUTOMATED COUNT: 16.6 % (ref 12.3–15.4)
ETHANOL UR QL: <0.01 %
GLOBULIN UR ELPH-MCNC: 3.3 GM/DL
GLUCOSE SERPL-MCNC: 90 MG/DL (ref 65–99)
HCT VFR BLD AUTO: 26.8 % (ref 37.5–51)
HGB BLD-MCNC: 8.9 G/DL (ref 13–17.7)
INR PPP: 1.33 (ref 0.93–1.1)
LYMPHOCYTES # BLD AUTO: 0.5 10*3/MM3 (ref 0.7–3.1)
LYMPHOCYTES NFR BLD AUTO: 17.9 % (ref 19.6–45.3)
MCH RBC QN AUTO: 31.4 PG (ref 26.6–33)
MCHC RBC AUTO-ENTMCNC: 33.3 G/DL (ref 31.5–35.7)
MCV RBC AUTO: 94.1 FL (ref 79–97)
MONOCYTES # BLD AUTO: 0.6 10*3/MM3 (ref 0.1–0.9)
MONOCYTES NFR BLD AUTO: 19.3 % (ref 5–12)
NEUTROPHILS NFR BLD AUTO: 1.7 10*3/MM3 (ref 1.7–7)
NEUTROPHILS NFR BLD AUTO: 57.2 % (ref 42.7–76)
NRBC BLD AUTO-RTO: 0 /100 WBC (ref 0–0.2)
PATHOLOGY REVIEW: YES
PLATELET # BLD AUTO: 38 10*3/MM3 (ref 140–450)
PMV BLD AUTO: 7.6 FL (ref 6–12)
POTASSIUM SERPL-SCNC: 4.6 MMOL/L (ref 3.5–5.2)
PROT SERPL-MCNC: 6.6 G/DL (ref 6–8.5)
PROTHROMBIN TIME: 13.5 SECONDS (ref 9.6–11.7)
RBC # BLD AUTO: 2.85 10*6/MM3 (ref 4.14–5.8)
ROULEAUX BLD QL SMEAR: NORMAL
SMALL PLATELETS BLD QL SMEAR: NORMAL
SODIUM SERPL-SCNC: 132 MMOL/L (ref 136–145)
WBC MORPH BLD: NORMAL
WBC NRBC COR # BLD: 3 10*3/MM3 (ref 3.4–10.8)

## 2022-08-23 PROCEDURE — 82077 ASSAY SPEC XCP UR&BREATH IA: CPT | Performed by: INTERNAL MEDICINE

## 2022-08-23 PROCEDURE — 80053 COMPREHEN METABOLIC PANEL: CPT | Performed by: INTERNAL MEDICINE

## 2022-08-23 PROCEDURE — 96365 THER/PROPH/DIAG IV INF INIT: CPT

## 2022-08-23 PROCEDURE — 76942 ECHO GUIDE FOR BIOPSY: CPT

## 2022-08-23 PROCEDURE — 85610 PROTHROMBIN TIME: CPT | Performed by: INTERNAL MEDICINE

## 2022-08-23 PROCEDURE — 25010000002 ALBUMIN HUMAN 25% PER 50 ML: Performed by: INTERNAL MEDICINE

## 2022-08-23 PROCEDURE — P9047 ALBUMIN (HUMAN), 25%, 50ML: HCPCS | Performed by: INTERNAL MEDICINE

## 2022-08-23 PROCEDURE — 96366 THER/PROPH/DIAG IV INF ADDON: CPT

## 2022-08-23 PROCEDURE — 49083 ABD PARACENTESIS W/IMAGING: CPT | Performed by: INTERNAL MEDICINE

## 2022-08-23 PROCEDURE — 85007 BL SMEAR W/DIFF WBC COUNT: CPT | Performed by: INTERNAL MEDICINE

## 2022-08-23 PROCEDURE — 85025 COMPLETE CBC W/AUTO DIFF WBC: CPT | Performed by: INTERNAL MEDICINE

## 2022-08-23 RX ORDER — ALBUMIN (HUMAN) 12.5 G/50ML
25 SOLUTION INTRAVENOUS DAILY PRN
Status: DISCONTINUED | OUTPATIENT
Start: 2022-08-23 | End: 2022-08-25 | Stop reason: HOSPADM

## 2022-08-23 RX ORDER — ALBUMIN (HUMAN) 12.5 G/50ML
12.5 SOLUTION INTRAVENOUS DAILY PRN
Status: DISCONTINUED | OUTPATIENT
Start: 2022-08-23 | End: 2022-08-25 | Stop reason: HOSPADM

## 2022-08-23 RX ADMIN — ALBUMIN (HUMAN) 25 G: 0.25 INJECTION, SOLUTION INTRAVENOUS at 09:28

## 2022-08-23 RX ADMIN — ALBUMIN (HUMAN) 12.5 G: 0.25 INJECTION, SOLUTION INTRAVENOUS at 10:36

## 2022-08-23 RX ADMIN — ALBUMIN (HUMAN) 25 G: 0.25 INJECTION, SOLUTION INTRAVENOUS at 09:59

## 2022-08-23 NOTE — PROGRESS NOTES
PARACENTESIS    Time Arrived in Department: 0730      Consent: yes  Allergies have been Reviewed: yes      ID Band Verified: yes    Method: Ambulatory    Lab Results: Checked    Physician: Wilbur      Nurse: Lor Fernando RN    IR Care Plan: Person Educated - Patient, Current Knowledge - Understands, Learning Barrier - None, Readiness to Learn - Acceptance, Teaching Topic - Procedure and Evaluation of Teaching - Verbalized Understanding      Neurological: Within Normal Limits    Skin: Flesh, Warm and Dry    Respiratory Status: Respirations even and unlabored    Room In: 9      Procedure: Paracentesis    Time Out Completed: yes    Time Out: 0905    Prep Time: 0907    Prep Site 1: Right Lateral Abdomen      Prep: Chlorhexidine and Sterile drape    Procedure Position: Right Side    Guidance: Ultrasound    Fluid Quality: Clear, Tiara and Pink    Procedure Note: Pt prepped and draped in a sterile fashion. 2% lidocaine for local anesthesia.  Procedure began at 0910.  Pt tolerated well        Intra Time 1:0930    Intra Assess 1:   LOC: Alert  Pain:  No Issues  Skin: Flesh, Warm and Dry  Respiratory Status:  Even and Unlabored  Intra Procedure Note 1: draining well        Intra Time 2: 0955    Intra Assess 2:   LOC: Alert  Pain: No Issues  Skin: Flesh, Warm and Dry  Respiratory Status: Even and Unlabored  Intra Procedure Note 2: continues to drain        Intra Time 3: 1015    Intra Assess 3:   LOC: Alert  Pain: No Issues  Skin: Flesh, Warm and Dry  Respiratory Status: Even and Unlabored  Intra Procedure Note 3:para completed      Post-Procedure Position: Supine and HOB Elevated    Dressing Site 1: 2x2, 4x4 and Tegaderm    Post Procedure Status:  Alert and Oriented, returned to baseline, Dressing Dry/ Intact, IV documented (see flowsheet), Stable Condition and Dressing properly labeled    Specimen To Lab: yes    Total Fluid Removed: 9.2 liters    Post Procedure Note:  Albumin given per MD order. Pt tolerated well        Report  Given To: n/a    Admit/Transfer To: n/a    Transfer Time: n/a    Discharge Time: 1115    Method: Ambulatory    O2 on Transfer: no    Accompanied By:  Family    Clothes Changed:  Self    Discharged Location:  Routine to Home    Discharge Teaching:  Care of Dressing, Follow up with MD, Home Care Instructions Sheet Given and PAtient

## 2022-08-23 NOTE — PROCEDURES
Procedure:Ultrasound-guided Paracentesis    Consent: Informed    Pre-op diagnosis: Cirrhosis, Massive ascites    Post-op diagnosis: Cirrhosis, Massive ascites    Anesthesia Provider: .Francisco Bowles MD    Anesthesia type: Local infiltration with 1% Xylocaine    Surgeon: Sukhdev Bowles MD    Assistant: none    Procedure summary:    Allergies , labs and medications were reviewed. Patient was made to lie supine and the area of interest was imaged with ultrasound and fluid verified and marked.  Area of interest was cleaned and draped in a sterile fashion. Subsequently local infiltration with Xylocaine. Trocar and cannula were advanced. Upon verification of the fluid, trocar was steadied and cannula advanced further. Trocar was drawn out. Cannula was connected to extension tubing and to the vacuum bottle. Subsequently the cannula was removed after completion of the procedure.     Findings: Clear straw yellow fluid was obtained. See nursing documentation for volume drained.    Lab Specimen: Not requested    Complication: none    EBL: 0 mL    Post-op condition: Stable. Albumin was given by protocol if needed.    Post-op plan: Discharge back to the referring physician.

## 2022-08-24 LAB
LAB AP CASE REPORT: NORMAL
PATH REPORT.FINAL DX SPEC: NORMAL

## 2022-08-30 ENCOUNTER — LAB (OUTPATIENT)
Dept: LAB | Facility: HOSPITAL | Age: 37
End: 2022-08-30

## 2022-08-30 DIAGNOSIS — K70.30 CIRRHOSIS, LAENNEC'S: ICD-10-CM

## 2022-08-30 LAB
ALBUMIN SERPL-MCNC: 3.6 G/DL (ref 3.5–5.2)
ALBUMIN/GLOB SERPL: 1.1 G/DL
ALP SERPL-CCNC: 127 U/L (ref 39–117)
ALT SERPL W P-5'-P-CCNC: 29 U/L (ref 1–41)
ANION GAP SERPL CALCULATED.3IONS-SCNC: 6.2 MMOL/L (ref 5–15)
ANISOCYTOSIS BLD QL: ABNORMAL
AST SERPL-CCNC: 42 U/L (ref 1–40)
BILIRUB SERPL-MCNC: 1.4 MG/DL (ref 0–1.2)
BUN SERPL-MCNC: 31 MG/DL (ref 6–20)
BUN/CREAT SERPL: 16.4 (ref 7–25)
CALCIUM SPEC-SCNC: 8.6 MG/DL (ref 8.6–10.5)
CHLORIDE SERPL-SCNC: 104 MMOL/L (ref 98–107)
CO2 SERPL-SCNC: 21.8 MMOL/L (ref 22–29)
CREAT SERPL-MCNC: 1.89 MG/DL (ref 0.76–1.27)
DEPRECATED RDW RBC AUTO: 50.6 FL (ref 37–54)
EGFRCR SERPLBLD CKD-EPI 2021: 46.9 ML/MIN/1.73
EOSINOPHIL # BLD MANUAL: 0.1 10*3/MM3 (ref 0–0.4)
EOSINOPHIL NFR BLD MANUAL: 3.1 % (ref 0.3–6.2)
ERYTHROCYTE [DISTWIDTH] IN BLOOD BY AUTOMATED COUNT: 14.7 % (ref 12.3–15.4)
ETHANOL UR QL: <0.01 %
GLOBULIN UR ELPH-MCNC: 3.2 GM/DL
GLUCOSE SERPL-MCNC: 114 MG/DL (ref 65–99)
HCT VFR BLD AUTO: 27.9 % (ref 37.5–51)
HGB BLD-MCNC: 9.4 G/DL (ref 13–17.7)
INR PPP: 1.34 (ref 0.93–1.1)
LYMPHOCYTES # BLD MANUAL: 0.65 10*3/MM3 (ref 0.7–3.1)
LYMPHOCYTES NFR BLD MANUAL: 8.2 % (ref 5–12)
MCH RBC QN AUTO: 31.6 PG (ref 26.6–33)
MCHC RBC AUTO-ENTMCNC: 33.7 G/DL (ref 31.5–35.7)
MCV RBC AUTO: 93.9 FL (ref 79–97)
MICROCYTES BLD QL: ABNORMAL
MONOCYTES # BLD: 0.27 10*3/MM3 (ref 0.1–0.9)
NEUTROPHILS # BLD AUTO: 2.32 10*3/MM3 (ref 1.7–7)
NEUTROPHILS NFR BLD MANUAL: 69.4 % (ref 42.7–76)
PLAT MORPH BLD: NORMAL
PLATELET # BLD AUTO: 43 10*3/MM3 (ref 140–450)
PMV BLD AUTO: 10.1 FL (ref 6–12)
POIKILOCYTOSIS BLD QL SMEAR: ABNORMAL
POTASSIUM SERPL-SCNC: 4.4 MMOL/L (ref 3.5–5.2)
PROT SERPL-MCNC: 6.8 G/DL (ref 6–8.5)
PROTHROMBIN TIME: 13.6 SECONDS (ref 9.6–11.7)
RBC # BLD AUTO: 2.97 10*6/MM3 (ref 4.14–5.8)
SODIUM SERPL-SCNC: 132 MMOL/L (ref 136–145)
VARIANT LYMPHS NFR BLD MANUAL: 19.4 % (ref 19.6–45.3)
WBC MORPH BLD: NORMAL
WBC NRBC COR # BLD: 3.35 10*3/MM3 (ref 3.4–10.8)

## 2022-08-30 PROCEDURE — 85025 COMPLETE CBC W/AUTO DIFF WBC: CPT

## 2022-08-30 PROCEDURE — 85007 BL SMEAR W/DIFF WBC COUNT: CPT

## 2022-08-30 PROCEDURE — 36415 COLL VENOUS BLD VENIPUNCTURE: CPT

## 2022-08-30 PROCEDURE — 80053 COMPREHEN METABOLIC PANEL: CPT

## 2022-08-30 PROCEDURE — 82077 ASSAY SPEC XCP UR&BREATH IA: CPT

## 2022-08-30 PROCEDURE — 85610 PROTHROMBIN TIME: CPT

## 2022-09-06 ENCOUNTER — LAB (OUTPATIENT)
Dept: LAB | Facility: HOSPITAL | Age: 37
End: 2022-09-06

## 2022-09-06 DIAGNOSIS — K70.30 CIRRHOSIS, LAENNEC'S: ICD-10-CM

## 2022-09-06 LAB
ALBUMIN SERPL-MCNC: 3.1 G/DL (ref 3.5–5.2)
ALBUMIN/GLOB SERPL: 0.9 G/DL
ALP SERPL-CCNC: 134 U/L (ref 39–117)
ALT SERPL W P-5'-P-CCNC: 30 U/L (ref 1–41)
ANION GAP SERPL CALCULATED.3IONS-SCNC: 6.3 MMOL/L (ref 5–15)
AST SERPL-CCNC: 39 U/L (ref 1–40)
BASOPHILS # BLD AUTO: 0.02 10*3/MM3 (ref 0–0.2)
BASOPHILS NFR BLD AUTO: 0.5 % (ref 0–1.5)
BILIRUB SERPL-MCNC: 1.3 MG/DL (ref 0–1.2)
BUN SERPL-MCNC: 29 MG/DL (ref 6–20)
BUN/CREAT SERPL: 15.5 (ref 7–25)
CALCIUM SPEC-SCNC: 8.4 MG/DL (ref 8.6–10.5)
CHLORIDE SERPL-SCNC: 107 MMOL/L (ref 98–107)
CO2 SERPL-SCNC: 19.7 MMOL/L (ref 22–29)
CREAT SERPL-MCNC: 1.87 MG/DL (ref 0.76–1.27)
DEPRECATED RDW RBC AUTO: 49.3 FL (ref 37–54)
EGFRCR SERPLBLD CKD-EPI 2021: 47.5 ML/MIN/1.73
EOSINOPHIL # BLD AUTO: 0.25 10*3/MM3 (ref 0–0.4)
EOSINOPHIL NFR BLD AUTO: 6.6 % (ref 0.3–6.2)
ERYTHROCYTE [DISTWIDTH] IN BLOOD BY AUTOMATED COUNT: 14.4 % (ref 12.3–15.4)
ETHANOL UR QL: <0.01 %
GLOBULIN UR ELPH-MCNC: 3.6 GM/DL
GLUCOSE SERPL-MCNC: 103 MG/DL (ref 65–99)
HCT VFR BLD AUTO: 28 % (ref 37.5–51)
HGB BLD-MCNC: 9.5 G/DL (ref 13–17.7)
INR PPP: 1.3 (ref 0.93–1.1)
LYMPHOCYTES # BLD AUTO: 0.55 10*3/MM3 (ref 0.7–3.1)
LYMPHOCYTES NFR BLD AUTO: 14.5 % (ref 19.6–45.3)
MCH RBC QN AUTO: 32 PG (ref 26.6–33)
MCHC RBC AUTO-ENTMCNC: 33.9 G/DL (ref 31.5–35.7)
MCV RBC AUTO: 94.3 FL (ref 79–97)
MONOCYTES # BLD AUTO: 0.42 10*3/MM3 (ref 0.1–0.9)
MONOCYTES NFR BLD AUTO: 11.1 % (ref 5–12)
NEUTROPHILS NFR BLD AUTO: 2.55 10*3/MM3 (ref 1.7–7)
NEUTROPHILS NFR BLD AUTO: 67 % (ref 42.7–76)
PLATELET # BLD AUTO: 41 10*3/MM3 (ref 140–450)
PMV BLD AUTO: 9.9 FL (ref 6–12)
POTASSIUM SERPL-SCNC: 4.7 MMOL/L (ref 3.5–5.2)
PROT SERPL-MCNC: 6.7 G/DL (ref 6–8.5)
PROTHROMBIN TIME: 13.2 SECONDS (ref 9.6–11.7)
RBC # BLD AUTO: 2.97 10*6/MM3 (ref 4.14–5.8)
SODIUM SERPL-SCNC: 133 MMOL/L (ref 136–145)
WBC NRBC COR # BLD: 3.8 10*3/MM3 (ref 3.4–10.8)

## 2022-09-06 PROCEDURE — 36415 COLL VENOUS BLD VENIPUNCTURE: CPT

## 2022-09-06 PROCEDURE — 82077 ASSAY SPEC XCP UR&BREATH IA: CPT

## 2022-09-06 PROCEDURE — 85025 COMPLETE CBC W/AUTO DIFF WBC: CPT

## 2022-09-06 PROCEDURE — 80053 COMPREHEN METABOLIC PANEL: CPT

## 2022-09-06 PROCEDURE — 85610 PROTHROMBIN TIME: CPT

## 2022-09-12 RX ORDER — ALBUMIN (HUMAN) 12.5 G/50ML
25 SOLUTION INTRAVENOUS EVERY 6 HOURS PRN
Status: CANCELLED
Start: 2022-09-13

## 2022-09-12 RX ORDER — ALBUMIN (HUMAN) 12.5 G/50ML
25 SOLUTION INTRAVENOUS DAILY PRN
Status: CANCELLED | OUTPATIENT
Start: 2022-09-13

## 2022-09-12 RX ORDER — ALBUMIN (HUMAN) 12.5 G/50ML
12.5 SOLUTION INTRAVENOUS EVERY 6 HOURS PRN
Status: CANCELLED
Start: 2022-09-13

## 2022-09-12 RX ORDER — ALBUMIN (HUMAN) 12.5 G/50ML
12.5 SOLUTION INTRAVENOUS DAILY PRN
Status: CANCELLED | OUTPATIENT
Start: 2022-09-13

## 2022-09-13 ENCOUNTER — HOSPITAL ENCOUNTER (OUTPATIENT)
Dept: INFUSION THERAPY | Facility: HOSPITAL | Age: 37
Discharge: HOME | End: 2022-09-13
Admitting: HOSPITALIST

## 2022-09-13 VITALS
HEART RATE: 88 BPM | SYSTOLIC BLOOD PRESSURE: 103 MMHG | DIASTOLIC BLOOD PRESSURE: 64 MMHG | RESPIRATION RATE: 17 BRPM | OXYGEN SATURATION: 100 %

## 2022-09-13 DIAGNOSIS — K70.30 CIRRHOSIS, LAENNEC'S: Primary | ICD-10-CM

## 2022-09-13 DIAGNOSIS — K70.31 ASCITES DUE TO ALCOHOLIC CIRRHOSIS: ICD-10-CM

## 2022-09-13 LAB
ALBUMIN SERPL-MCNC: 3.7 G/DL (ref 3.5–5.2)
ALBUMIN/GLOB SERPL: 1.2 G/DL
ALP SERPL-CCNC: 158 U/L (ref 39–117)
ALT SERPL W P-5'-P-CCNC: 29 U/L (ref 1–41)
ANION GAP SERPL CALCULATED.3IONS-SCNC: 9 MMOL/L (ref 5–15)
AST SERPL-CCNC: 42 U/L (ref 1–40)
BILIRUB SERPL-MCNC: 1.3 MG/DL (ref 0–1.2)
BUN SERPL-MCNC: 24 MG/DL (ref 6–20)
BUN/CREAT SERPL: 15.1 (ref 7–25)
CALCIUM SPEC-SCNC: 8.4 MG/DL (ref 8.6–10.5)
CHLORIDE SERPL-SCNC: 106 MMOL/L (ref 98–107)
CO2 SERPL-SCNC: 21 MMOL/L (ref 22–29)
CREAT SERPL-MCNC: 1.59 MG/DL (ref 0.76–1.27)
DEPRECATED RDW RBC AUTO: 49.9 FL (ref 37–54)
EGFRCR SERPLBLD CKD-EPI 2021: 57.7 ML/MIN/1.73
EOSINOPHIL # BLD MANUAL: 0.33 10*3/MM3 (ref 0–0.4)
EOSINOPHIL NFR BLD MANUAL: 10 % (ref 0.3–6.2)
ERYTHROCYTE [DISTWIDTH] IN BLOOD BY AUTOMATED COUNT: 15.1 % (ref 12.3–15.4)
ETHANOL UR QL: <0.01 %
GLOBULIN UR ELPH-MCNC: 3.2 GM/DL
GLUCOSE SERPL-MCNC: 87 MG/DL (ref 65–99)
HCT VFR BLD AUTO: 29.7 % (ref 37.5–51)
HGB BLD-MCNC: 10.2 G/DL (ref 13–17.7)
INR PPP: 1.3 (ref 0.93–1.1)
LYMPHOCYTES # BLD MANUAL: 0.59 10*3/MM3 (ref 0.7–3.1)
LYMPHOCYTES NFR BLD MANUAL: 7 % (ref 5–12)
MCH RBC QN AUTO: 32.8 PG (ref 26.6–33)
MCHC RBC AUTO-ENTMCNC: 34.3 G/DL (ref 31.5–35.7)
MCV RBC AUTO: 95.7 FL (ref 79–97)
MONOCYTES # BLD: 0.23 10*3/MM3 (ref 0.1–0.9)
NEUTROPHILS # BLD AUTO: 2.15 10*3/MM3 (ref 1.7–7)
NEUTROPHILS NFR BLD MANUAL: 61 % (ref 42.7–76)
NEUTS BAND NFR BLD MANUAL: 4 % (ref 0–5)
PLATELET # BLD AUTO: 41 10*3/MM3 (ref 140–450)
PMV BLD AUTO: 7.7 FL (ref 6–12)
POTASSIUM SERPL-SCNC: 4.8 MMOL/L (ref 3.5–5.2)
PROT SERPL-MCNC: 6.9 G/DL (ref 6–8.5)
PROTHROMBIN TIME: 13.2 SECONDS (ref 9.6–11.7)
RBC # BLD AUTO: 3.1 10*6/MM3 (ref 4.14–5.8)
RBC MORPH BLD: NORMAL
SCAN SLIDE: NORMAL
SMALL PLATELETS BLD QL SMEAR: ABNORMAL
SODIUM SERPL-SCNC: 136 MMOL/L (ref 136–145)
VARIANT LYMPHS NFR BLD MANUAL: 16 % (ref 19.6–45.3)
VARIANT LYMPHS NFR BLD MANUAL: 2 % (ref 0–5)
WBC MORPH BLD: NORMAL
WBC NRBC COR # BLD: 3.3 10*3/MM3 (ref 3.4–10.8)

## 2022-09-13 PROCEDURE — 85007 BL SMEAR W/DIFF WBC COUNT: CPT | Performed by: INTERNAL MEDICINE

## 2022-09-13 PROCEDURE — P9047 ALBUMIN (HUMAN), 25%, 50ML: HCPCS | Performed by: INTERNAL MEDICINE

## 2022-09-13 PROCEDURE — 82077 ASSAY SPEC XCP UR&BREATH IA: CPT | Performed by: INTERNAL MEDICINE

## 2022-09-13 PROCEDURE — 36415 COLL VENOUS BLD VENIPUNCTURE: CPT

## 2022-09-13 PROCEDURE — 85610 PROTHROMBIN TIME: CPT | Performed by: INTERNAL MEDICINE

## 2022-09-13 PROCEDURE — 85025 COMPLETE CBC W/AUTO DIFF WBC: CPT | Performed by: INTERNAL MEDICINE

## 2022-09-13 PROCEDURE — 49083 ABD PARACENTESIS W/IMAGING: CPT | Performed by: HOSPITALIST

## 2022-09-13 PROCEDURE — 25010000002 ALBUMIN HUMAN 25% PER 50 ML: Performed by: INTERNAL MEDICINE

## 2022-09-13 PROCEDURE — 80053 COMPREHEN METABOLIC PANEL: CPT | Performed by: INTERNAL MEDICINE

## 2022-09-13 PROCEDURE — 76942 ECHO GUIDE FOR BIOPSY: CPT

## 2022-09-13 PROCEDURE — 96366 THER/PROPH/DIAG IV INF ADDON: CPT

## 2022-09-13 PROCEDURE — 96365 THER/PROPH/DIAG IV INF INIT: CPT

## 2022-09-13 RX ORDER — ALBUMIN (HUMAN) 12.5 G/50ML
25 SOLUTION INTRAVENOUS EVERY 6 HOURS PRN
Start: 2022-09-20

## 2022-09-13 RX ORDER — ALBUMIN (HUMAN) 12.5 G/50ML
12.5 SOLUTION INTRAVENOUS EVERY 6 HOURS PRN
Status: DISCONTINUED | OUTPATIENT
Start: 2022-09-13 | End: 2022-09-15 | Stop reason: HOSPADM

## 2022-09-13 RX ORDER — ALBUMIN (HUMAN) 12.5 G/50ML
25 SOLUTION INTRAVENOUS EVERY 6 HOURS PRN
Status: DISCONTINUED | OUTPATIENT
Start: 2022-09-13 | End: 2022-09-15 | Stop reason: HOSPADM

## 2022-09-13 RX ORDER — ALBUMIN (HUMAN) 12.5 G/50ML
12.5 SOLUTION INTRAVENOUS EVERY 6 HOURS PRN
Start: 2022-09-20

## 2022-09-13 RX ADMIN — ALBUMIN (HUMAN) 25 G: 0.25 INJECTION, SOLUTION INTRAVENOUS at 13:29

## 2022-09-13 RX ADMIN — ALBUMIN (HUMAN) 25 G: 0.25 INJECTION, SOLUTION INTRAVENOUS at 12:56

## 2022-09-13 NOTE — PROGRESS NOTES
PARACENTESIS    Time Arrived in Department: 1130      ID Band Verified: yes    Method: Ambulatory    Lab Results: Checked and MD Notified     Physician: Dr. Villavicencio      Nurse: Georgia Small, RN    IR Care Plan: Person Educated - Patient, Current Knowledge - Understands, Learning Barrier - None, Readiness to Learn - Acceptance, Teaching Topic - Procedure and Evaluation of Teaching - Verbalized Understanding      Neurological: Within Normal Limits    Skin: Flesh, Warm and Dry    Respiratory Status: Respirations regular and unlabored    Room In: 9    Post-Procedure Position: Supine and HOB Elevated    Dressing Site 1: 2x2, 4x4 and Covaderm    Post Procedure Status:  Alert and Oriented, returned to baseline, Dressing Dry/ Intact, IV documented (see flowsheet), Stable Condition and Dressing properly labeled    Specimen To Lab: no    Total Fluid Removed: 6.2 liters  Color of fluid: Clear dark pink    Post Procedure Note:  Patient tolerated procedure well      Discharge Time: 1410    Method: Ambulatory    O2 on Transfer: no    Accompanied By:  PArents    Discharge Teaching:  Inpatient, Care of Dressing and PAtient

## 2022-09-13 NOTE — PROCEDURES
"Therapeutic Bedside Paracentesis Without  Radiology    Date/Time: 9/13/2022 12:22 PM  Performed by: Kate Villavicencio MD  Authorized by: Maliha Palencia MD   Consent: Verbal consent obtained. Written consent obtained.  Risks and benefits: risks, benefits and alternatives were discussed  Consent given by: patient  Patient understanding: patient states understanding of the procedure being performed  Patient consent: the patient's understanding of the procedure matches consent given  Procedure consent: procedure consent matches procedure scheduled  Relevant documents: relevant documents present and verified  Test results: test results available and properly labeled  Site marked: the operative site was marked  Imaging studies: imaging studies available  Required items: required blood products, implants, devices, and special equipment available  Patient identity confirmed: verbally with patient  Time out: Immediately prior to procedure a \"time out\" was called to verify the correct patient, procedure, equipment, support staff and site/side marked as required.  Initial or subsequent exam: subsequent  Procedure purpose: therapeutic  Indications: abdominal discomfort secondary to ascites  Anesthesia: local infiltration    Anesthesia:  Local Anesthetic: lidocaine 2% with epinephrine    Sedation:  Patient sedated: no    Preparation: Patient was prepped and draped in the usual sterile fashion.  Needle gauge: 22  Ultrasound guidance: yes  Puncture site: right lower quadrant  Fluid appearance: serous  Patient tolerance: patient tolerated the procedure well with no immediate complications  Comments: Please see nurses notes for amount of fluid removed.      Electronically signed by Kate Villavicencio MD, 09/13/22, 7:03 PM EDT.    "

## 2022-09-14 NOTE — PAT
Pt states this procedure has been canceled twice due to low plts--- encouraged him to call [facility] and make sure they are aware that pt would need an infusion before procedure as he states that is what they suggested

## 2022-09-20 ENCOUNTER — LAB (OUTPATIENT)
Dept: LAB | Facility: HOSPITAL | Age: 37
End: 2022-09-20

## 2022-09-20 DIAGNOSIS — K70.30 CIRRHOSIS, LAENNEC'S: ICD-10-CM

## 2022-09-20 LAB
ALBUMIN SERPL-MCNC: 3.9 G/DL (ref 3.5–5.2)
ALBUMIN/GLOB SERPL: 1.4 G/DL
ALP SERPL-CCNC: 126 U/L (ref 39–117)
ALT SERPL W P-5'-P-CCNC: 23 U/L (ref 1–41)
ANION GAP SERPL CALCULATED.3IONS-SCNC: 7 MMOL/L (ref 5–15)
AST SERPL-CCNC: 36 U/L (ref 1–40)
BILIRUB SERPL-MCNC: 1.5 MG/DL (ref 0–1.2)
BUN SERPL-MCNC: 29 MG/DL (ref 6–20)
BUN/CREAT SERPL: 14 (ref 7–25)
CALCIUM SPEC-SCNC: 9.1 MG/DL (ref 8.6–10.5)
CHLORIDE SERPL-SCNC: 105 MMOL/L (ref 98–107)
CO2 SERPL-SCNC: 23 MMOL/L (ref 22–29)
CREAT SERPL-MCNC: 2.07 MG/DL (ref 0.76–1.27)
DEPRECATED RDW RBC AUTO: 47.8 FL (ref 37–54)
EGFRCR SERPLBLD CKD-EPI 2021: 42 ML/MIN/1.73
EOSINOPHIL # BLD MANUAL: 0.21 10*3/MM3 (ref 0–0.4)
EOSINOPHIL NFR BLD MANUAL: 7 % (ref 0.3–6.2)
ERYTHROCYTE [DISTWIDTH] IN BLOOD BY AUTOMATED COUNT: 13.7 % (ref 12.3–15.4)
ETHANOL UR QL: <0.01 %
GLOBULIN UR ELPH-MCNC: 2.8 GM/DL
GLUCOSE SERPL-MCNC: 100 MG/DL (ref 65–99)
HCT VFR BLD AUTO: 28.9 % (ref 37.5–51)
HGB BLD-MCNC: 9.6 G/DL (ref 13–17.7)
INR PPP: 1.33 (ref 0.93–1.1)
LYMPHOCYTES # BLD MANUAL: 0.33 10*3/MM3 (ref 0.7–3.1)
LYMPHOCYTES NFR BLD MANUAL: 5 % (ref 5–12)
MCH RBC QN AUTO: 31.8 PG (ref 26.6–33)
MCHC RBC AUTO-ENTMCNC: 33.2 G/DL (ref 31.5–35.7)
MCV RBC AUTO: 95.7 FL (ref 79–97)
MONOCYTES # BLD: 0.15 10*3/MM3 (ref 0.1–0.9)
NEUTROPHILS # BLD AUTO: 2.31 10*3/MM3 (ref 1.7–7)
NEUTROPHILS NFR BLD MANUAL: 77 % (ref 42.7–76)
PLAT MORPH BLD: NORMAL
PLATELET # BLD AUTO: 34 10*3/MM3 (ref 140–450)
PMV BLD AUTO: 10 FL (ref 6–12)
POTASSIUM SERPL-SCNC: 4.7 MMOL/L (ref 3.5–5.2)
PROT SERPL-MCNC: 6.7 G/DL (ref 6–8.5)
PROTHROMBIN TIME: 13.5 SECONDS (ref 9.6–11.7)
RBC # BLD AUTO: 3.02 10*6/MM3 (ref 4.14–5.8)
RBC MORPH BLD: NORMAL
SODIUM SERPL-SCNC: 135 MMOL/L (ref 136–145)
VARIANT LYMPHS NFR BLD MANUAL: 11 % (ref 19.6–45.3)
WBC MORPH BLD: NORMAL
WBC NRBC COR # BLD: 3 10*3/MM3 (ref 3.4–10.8)

## 2022-09-20 PROCEDURE — 85007 BL SMEAR W/DIFF WBC COUNT: CPT

## 2022-09-20 PROCEDURE — 80053 COMPREHEN METABOLIC PANEL: CPT

## 2022-09-20 PROCEDURE — 36415 COLL VENOUS BLD VENIPUNCTURE: CPT

## 2022-09-20 PROCEDURE — 82077 ASSAY SPEC XCP UR&BREATH IA: CPT

## 2022-09-20 PROCEDURE — 85025 COMPLETE CBC W/AUTO DIFF WBC: CPT

## 2022-09-20 PROCEDURE — 85610 PROTHROMBIN TIME: CPT

## 2022-09-26 ENCOUNTER — ON CAMPUS - OUTPATIENT (AMBULATORY)
Dept: URBAN - METROPOLITAN AREA HOSPITAL 85 | Facility: HOSPITAL | Age: 37
End: 2022-09-26
Payer: COMMERCIAL

## 2022-09-26 ENCOUNTER — ANESTHESIA EVENT (OUTPATIENT)
Dept: GASTROENTEROLOGY | Facility: HOSPITAL | Age: 37
End: 2022-09-26

## 2022-09-26 ENCOUNTER — HOSPITAL ENCOUNTER (OUTPATIENT)
Facility: HOSPITAL | Age: 37
Setting detail: HOSPITAL OUTPATIENT SURGERY
Discharge: HOME | End: 2022-09-26
Attending: INTERNAL MEDICINE | Admitting: INTERNAL MEDICINE

## 2022-09-26 ENCOUNTER — ANESTHESIA (OUTPATIENT)
Dept: GASTROENTEROLOGY | Facility: HOSPITAL | Age: 37
End: 2022-09-26

## 2022-09-26 VITALS — OXYGEN SATURATION: 100 % | DIASTOLIC BLOOD PRESSURE: 55 MMHG | SYSTOLIC BLOOD PRESSURE: 95 MMHG | HEART RATE: 80 BPM

## 2022-09-26 VITALS
WEIGHT: 182.2 LBS | OXYGEN SATURATION: 99 % | TEMPERATURE: 98.3 F | RESPIRATION RATE: 19 BRPM | DIASTOLIC BLOOD PRESSURE: 59 MMHG | SYSTOLIC BLOOD PRESSURE: 98 MMHG | HEART RATE: 82 BPM | BODY MASS INDEX: 23.38 KG/M2 | HEIGHT: 74 IN

## 2022-09-26 DIAGNOSIS — K31.89 OTHER DISEASES OF STOMACH AND DUODENUM: ICD-10-CM

## 2022-09-26 DIAGNOSIS — Z76.82 AWAITING ORGAN TRANSPLANT STATUS: ICD-10-CM

## 2022-09-26 DIAGNOSIS — I85.00 ESOPHAGEAL VARICES WITHOUT BLEEDING: ICD-10-CM

## 2022-09-26 DIAGNOSIS — K20.80 OTHER ESOPHAGITIS WITHOUT BLEEDING: ICD-10-CM

## 2022-09-26 DIAGNOSIS — K76.6 PORTAL HYPERTENSION: ICD-10-CM

## 2022-09-26 DIAGNOSIS — K70.30 ALCOHOLIC CIRRHOSIS OF LIVER WITHOUT ASCITES: ICD-10-CM

## 2022-09-26 LAB
ABO GROUP BLD: NORMAL
BASOPHILS # BLD AUTO: 0 10*3/MM3 (ref 0–0.2)
BASOPHILS NFR BLD AUTO: 0.6 % (ref 0–1.5)
BLD GP AB SCN SERPL QL: NEGATIVE
DEPRECATED RDW RBC AUTO: 49.9 FL (ref 37–54)
EOSINOPHIL # BLD AUTO: 0.2 10*3/MM3 (ref 0–0.4)
EOSINOPHIL NFR BLD AUTO: 6.5 % (ref 0.3–6.2)
ERYTHROCYTE [DISTWIDTH] IN BLOOD BY AUTOMATED COUNT: 15 % (ref 12.3–15.4)
HCT VFR BLD AUTO: 29.3 % (ref 37.5–51)
HGB BLD-MCNC: 10 G/DL (ref 13–17.7)
INR PPP: 1.33 (ref 0.93–1.1)
LYMPHOCYTES # BLD AUTO: 0.5 10*3/MM3 (ref 0.7–3.1)
LYMPHOCYTES NFR BLD AUTO: 16.1 % (ref 19.6–45.3)
MCH RBC QN AUTO: 31.9 PG (ref 26.6–33)
MCHC RBC AUTO-ENTMCNC: 34.1 G/DL (ref 31.5–35.7)
MCV RBC AUTO: 93.7 FL (ref 79–97)
MONOCYTES # BLD AUTO: 0.4 10*3/MM3 (ref 0.1–0.9)
MONOCYTES NFR BLD AUTO: 13.5 % (ref 5–12)
NEUTROPHILS NFR BLD AUTO: 1.9 10*3/MM3 (ref 1.7–7)
NEUTROPHILS NFR BLD AUTO: 63.3 % (ref 42.7–76)
NRBC BLD AUTO-RTO: 0.1 /100 WBC (ref 0–0.2)
PLATELET # BLD AUTO: 36 10*3/MM3 (ref 140–450)
PMV BLD AUTO: 7 FL (ref 6–12)
PROTHROMBIN TIME: 13.5 SECONDS (ref 9.6–11.7)
RBC # BLD AUTO: 3.13 10*6/MM3 (ref 4.14–5.8)
RH BLD: POSITIVE
T&S EXPIRATION DATE: NORMAL
WBC NRBC COR # BLD: 3 10*3/MM3 (ref 3.4–10.8)

## 2022-09-26 PROCEDURE — 86901 BLOOD TYPING SEROLOGIC RH(D): CPT | Performed by: NURSE PRACTITIONER

## 2022-09-26 PROCEDURE — 25010000002 PROPOFOL 10 MG/ML EMULSION: Performed by: ANESTHESIOLOGY

## 2022-09-26 PROCEDURE — P9035 PLATELET PHERES LEUKOREDUCED: HCPCS

## 2022-09-26 PROCEDURE — 85610 PROTHROMBIN TIME: CPT | Performed by: INTERNAL MEDICINE

## 2022-09-26 PROCEDURE — 36430 TRANSFUSION BLD/BLD COMPNT: CPT

## 2022-09-26 PROCEDURE — 86900 BLOOD TYPING SEROLOGIC ABO: CPT

## 2022-09-26 PROCEDURE — 86900 BLOOD TYPING SEROLOGIC ABO: CPT | Performed by: NURSE PRACTITIONER

## 2022-09-26 PROCEDURE — 86850 RBC ANTIBODY SCREEN: CPT | Performed by: NURSE PRACTITIONER

## 2022-09-26 PROCEDURE — 86901 BLOOD TYPING SEROLOGIC RH(D): CPT

## 2022-09-26 PROCEDURE — 43235 EGD DIAGNOSTIC BRUSH WASH: CPT | Performed by: INTERNAL MEDICINE

## 2022-09-26 PROCEDURE — 85025 COMPLETE CBC W/AUTO DIFF WBC: CPT | Performed by: INTERNAL MEDICINE

## 2022-09-26 PROCEDURE — P9100 PATHOGEN TEST FOR PLATELETS: HCPCS

## 2022-09-26 RX ORDER — LIDOCAINE HYDROCHLORIDE 20 MG/ML
INJECTION, SOLUTION EPIDURAL; INFILTRATION; INTRACAUDAL; PERINEURAL AS NEEDED
Status: DISCONTINUED | OUTPATIENT
Start: 2022-09-26 | End: 2022-09-26 | Stop reason: SURG

## 2022-09-26 RX ORDER — SODIUM CHLORIDE 9 MG/ML
9 INJECTION, SOLUTION INTRAVENOUS ONCE
Status: COMPLETED | OUTPATIENT
Start: 2022-09-26 | End: 2022-09-26

## 2022-09-26 RX ORDER — SODIUM CHLORIDE 0.9 % (FLUSH) 0.9 %
3 SYRINGE (ML) INJECTION EVERY 12 HOURS SCHEDULED
Status: DISCONTINUED | OUTPATIENT
Start: 2022-09-26 | End: 2022-09-26 | Stop reason: HOSPADM

## 2022-09-26 RX ORDER — SODIUM CHLORIDE 0.9 % (FLUSH) 0.9 %
3-10 SYRINGE (ML) INJECTION AS NEEDED
Status: DISCONTINUED | OUTPATIENT
Start: 2022-09-26 | End: 2022-09-26 | Stop reason: HOSPADM

## 2022-09-26 RX ORDER — PROPOFOL 10 MG/ML
VIAL (ML) INTRAVENOUS AS NEEDED
Status: DISCONTINUED | OUTPATIENT
Start: 2022-09-26 | End: 2022-09-26 | Stop reason: SURG

## 2022-09-26 RX ORDER — ONDANSETRON 2 MG/ML
4 INJECTION INTRAMUSCULAR; INTRAVENOUS ONCE AS NEEDED
Status: DISCONTINUED | OUTPATIENT
Start: 2022-09-26 | End: 2022-09-26 | Stop reason: HOSPADM

## 2022-09-26 RX ADMIN — PROPOFOL 150 MG: 10 INJECTION, EMULSION INTRAVENOUS at 13:06

## 2022-09-26 RX ADMIN — SODIUM CHLORIDE: 0.9 INJECTION, SOLUTION INTRAVENOUS at 13:04

## 2022-09-26 RX ADMIN — LIDOCAINE HYDROCHLORIDE 80 MG: 20 INJECTION, SOLUTION EPIDURAL; INFILTRATION; INTRACAUDAL; PERINEURAL at 13:06

## 2022-09-26 NOTE — ANESTHESIA PREPROCEDURE EVALUATION
Anesthesia Evaluation     Patient summary reviewed and Nursing notes reviewed   NPO Solid Status: > 8 hours  NPO Liquid Status: > 8 hours           Airway   Mallampati: II  TM distance: >3 FB  Neck ROM: full  No difficulty expected  Dental - normal exam     Pulmonary - normal exam   Cardiovascular - normal exam    ECG reviewed        Neuro/Psych  GI/Hepatic/Renal/Endo    (+)   liver disease, renal disease,     Musculoskeletal     Abdominal  - normal exam    Bowel sounds: normal.   Substance History   (+) alcohol use,      OB/GYN          Other   blood dyscrasia thrombocytopenia,     ROS/Med Hx Other: SR     Preserved LV and RV size and function, EF 70% normal global and regional wall motion  Normal atrial sizes grossly,  Mildly elevated aortic gradients peak of 25 mean of 13, no regurgitation, nodular echodensity on the right and left coronary cusp margin immobile similar to calcification, valve leaflets otherwise open freely  Trace MR posteriorly directed no stenosis  Trace to mild TR normal PA systolic pressure no stenosis  Pulmonic valve not well seen no stenosis by Doppler imaging  Normal diastolic function by criteria  Bilateral large left and right pleural effusions  Small circumferential pericardial effusion, no evidence of tamponade or increased pericardial pressure seen                    Anesthesia Plan    ASA 4     MAC     intravenous induction     Anesthetic plan, risks, benefits, and alternatives have been provided, discussed and informed consent has been obtained with: patient.        CODE STATUS:

## 2022-09-26 NOTE — DISCHARGE INSTRUCTIONS
A responsible adult should stay with you and you should rest quietly for the rest of the day.    Do not drink alcohol, drive operate any heavy machinery or power tools or make any legal/important decisions for the next 24 hours.    Progress your diet as tolerated.  If you begin to experience severe pain, increased shortness of breath, racing heartbeat or a fever above 101F, seek immediate medical attention.     Follow up with MD as instructed.  Call office for results in 3 to 5 days if needed.    Impression:  1.  Small esophageal varices  2.  Mild grade a erosive esophagitis  3.  Mild portal hypertensive gastropathy     Recommendations:  1.  Continue routine medications  2.  Diet as tolerated  3.  Follow-up for liver transplant as scheduled  4.  Repeat EGD in 1 year        Paco Skinner MD      Date: 9/26/2022    Time: 13:12 EDT

## 2022-09-26 NOTE — H&P
"GI PREOPERATIVE HISTORY AND PHYSICAL:    Referring Provider:    Nicole Mcduffie APRN    Chief complaint: Esophageal varices    Subjective .     History of present illness:      Adam Alvares Jr. is a 36 y.o. male who presents today for Procedure(s):  ESOPHAGOGASTRODUODENOSCOPY for the indications listed below.     Esophageal varices    The updated Patient Profile was reviewed prior to the procedure, in conjunction with the Physical Exam, including medical conditions, surgical procedures, medications, allergies, family history and social history.     Pre-operatively, I reviewed the indication(s) for the procedure, the risks of the procedure [including but not limited to: unexpected bleeding possibly requiring hospitalization and/or unplanned repeat procedures, perforation possibly requiring surgical treatment, missed lesions and complications of sedation/MAC (also explained by anesthesia staff)].     I have evaluated the patient for risks associated with the planned anesthesia and the procedure to be performed and find the patient an acceptable candidate for IV sedation.    Multiple opportunities were provided for any questions or concerns, and all questions were answered satisfactorily before any anesthesia was administered. We will proceed with the planned procedure.    Past Medical History:  Past Medical History:   Diagnosis Date   • Alcohol abuse    • Liver disease     low platelets       Past Surgical History:  Past Surgical History:   Procedure Laterality Date   • BEDSIDE PARACENTESIS  9/30/2021            Social History:  Social History     Tobacco Use   • Smoking status: Never Smoker   • Smokeless tobacco: Never Used   Vaping Use   • Vaping Use: Never used   Substance Use Topics   • Alcohol use: Not Currently     Comment: 06/01/21: last drink 2 weeks ago; \"use to drink 4 shots of liquor 3 times per week, and then some on the weekends\"   • Drug use: Never       Family History:  Family History   Problem Relation " "Age of Onset   • Alcohol abuse Father    • Alcohol abuse Other    • Diabetes Other    • Heart disease Other    • Hyperlipidemia Other    • Hypertension Other        Medications:  No medications prior to admission.       Scheduled Meds:  Continuous Infusions:No current facility-administered medications for this encounter.    PRN Meds:.    ALLERGIES:  Patient has no known allergies.    ROS:  The following systems were reviewed and negative;   Constitution:  No fevers, chills, no unintentional weight loss  Skin: no rash, no jaundice  Eyes:  No blurry vision, no eye pain  HENT:  No change in hearing or smell  Resp:  No dyspnea or cough  CV:  No chest pain or palpitations  :  No dysuria, hematuria  Musculoskeletal:  No leg cramps or arthralgias  Neuro:  No tremor, no numbness  Psych:  No depression or confsuion    Objective     Vital Signs:   Vitals:    09/14/22 1258   Weight: 77.1 kg (170 lb)   Height: 188 cm (74\")       Physical Exam:       General Appearance:    Awake and alert, in no acute distress   Head:    Normocephalic, without obvious abnormality, atraumatic   Throat:   No oral lesions, no thrush, oral mucosa moist   Lungs  Cardiac:  Abdomen:  Extremities:     Respirations regular, even and unlabored    Regular rate and rhythm, no murmur, gallop, rub    Non-distended, good bowel sounds, non tender, no masses     No edema, pulses 2+   Skin:   No rash, no jaundice       Results Review:  Lab Results (last 24 hours)     ** No results found for the last 24 hours. **          Imaging Results (Last 24 Hours)     ** No results found for the last 24 hours. **           I reviewed the patient's labs and imaging.    ASSESSMENT AND PLAN:  Esophageal varices    Active Problems:    * No active hospital problems. *       Procedure(s):  ESOPHAGOGASTRODUODENOSCOPY      I discussed the patients findings and my recommendations with the patient.    Paco Skinner MD  09/26/22  06:59 EDT  "

## 2022-09-26 NOTE — OP NOTE
ESOPHAGOGASTRODUODENOSCOPY Procedure Report    Patient Name:  Adam Alvares Jr.  YOB: 1985    Date of Surgery:  9/26/2022     Pre-Op Diagnosis:  Esophageal varices (HCC) [I85.00]  Cirrhosis, alcoholic (HCC) [K70.30]  Liver transplant candidate [Z76.82]    Post-Op Diagnosis:  1.  Small esophageal varices  2.  Grade a erosive esophagitis       Procedure/CPT® Codes:      Procedure(s):  ESOPHAGOGASTRODUODENOSCOPY    Staff:  Surgeon(s):  Paco Skinner MD      Anesthesia: Monitored Anesthesia Care    Description of Procedure:  A description of the procedure as well as risks, benefits and alternative methods were explained to the patient who voiced understanding and signed the corresponding consent form. A physical exam was performed and vital signs were monitored throughout the procedure.    After informed consent was obtained, the patient was placed in the left lateral decubitus position and sedated as above.  The Olympus video gastroscope was inserted into the oropharynx and the esophagus was intubated without difficulty.  Examination of the esophagus, stomach, pylorus, duodenal bulb, and second portion of the duodenum was performed without difficulty. The scope was then retroflexed and the fundus was visualized. The procedure was not difficult and there were no immediate complications.  There was no blood loss.    Findings:  Esophagus: 2 columns of small esophageal varices which were barely visible unless the esophagus was completely deflated.  Small erosion at the GE junction consistent with mild grade a erosive esophagitis.  Stomach: Portal hypertensive gastropathy  Duodenum: Normal    Impression:  1.  Small esophageal varices  2.  Mild grade a erosive esophagitis  3.  Mild portal hypertensive gastropathy    Recommendations:  1.  Continue routine medications  2.  Diet as tolerated  3.  Follow-up for liver transplant as scheduled  4.  Repeat EGD in 1 year      Paco Skinner MD     Date:  9/26/2022    Time: 13:12 EDT      .

## 2022-09-26 NOTE — ANESTHESIA POSTPROCEDURE EVALUATION
Patient: Adam Alvares Jr.    Procedure Summary     Date: 09/26/22 Room / Location: [facility] ENDOSCOPY 1 / [facility] ENDOSCOPY    Anesthesia Start: 1304 Anesthesia Stop: 1313    Procedure: ESOPHAGOGASTRODUODENOSCOPY (N/A ) Diagnosis:       Esophageal varices (HCC)      Cirrhosis, alcoholic (HCC)      Liver transplant candidate      (Esophageal varices (HCC) [I85.00])      (Cirrhosis, alcoholic (HCC) [K70.30])      (Liver transplant candidate [Z76.82])    Surgeons: Paco Skinner MD Provider: Jt Camarena MD    Anesthesia Type: MAC ASA Status: 4          Anesthesia Type: MAC    Vitals  Vitals Value Taken Time   BP 98/59 09/26/22 1329   Temp     Pulse 85 09/26/22 1330   Resp 19 09/26/22 1329   SpO2 99 % 09/26/22 1329   Vitals shown include unvalidated device data.        Post Anesthesia Care and Evaluation    Patient location during evaluation: PACU  Patient participation: complete - patient participated  Level of consciousness: awake  Pain management: adequate    Airway patency: patent  Anesthetic complications: No anesthetic complications  PONV Status: none  Cardiovascular status: acceptable  Respiratory status: acceptable  Hydration status: acceptable    Comments: Patient seen and examined postoperatively; vital signs stable; SpO2 greater than or equal to 90%; cardiopulmonary status stable; nausea/vomiting adequately controlled; pain adequately controlled; no apparent anesthesia complications; patient discharged from anesthesia care when discharge criteria were met

## 2022-09-27 LAB
BH BB BLOOD EXPIRATION DATE: NORMAL
BH BB BLOOD TYPE BARCODE: 6200
BH BB DISPENSE STATUS: NORMAL
BH BB PRODUCT CODE: NORMAL
BH BB UNIT NUMBER: NORMAL
UNIT  ABO: NORMAL
UNIT  RH: NORMAL

## 2022-10-04 ENCOUNTER — LAB (OUTPATIENT)
Dept: LAB | Facility: HOSPITAL | Age: 37
End: 2022-10-04

## 2022-10-04 DIAGNOSIS — K70.30 CIRRHOSIS, LAENNEC'S: ICD-10-CM

## 2022-10-04 LAB
BASOPHILS # BLD AUTO: 0.01 10*3/MM3 (ref 0–0.2)
BASOPHILS NFR BLD AUTO: 0.3 % (ref 0–1.5)
DEPRECATED RDW RBC AUTO: 45.3 FL (ref 37–54)
EOSINOPHIL # BLD AUTO: 0.17 10*3/MM3 (ref 0–0.4)
EOSINOPHIL NFR BLD AUTO: 5.3 % (ref 0.3–6.2)
ERYTHROCYTE [DISTWIDTH] IN BLOOD BY AUTOMATED COUNT: 13.5 % (ref 12.3–15.4)
ETHANOL UR QL: <0.01 %
HCT VFR BLD AUTO: 26.6 % (ref 37.5–51)
HGB BLD-MCNC: 9.4 G/DL (ref 13–17.7)
INR PPP: 1.3 (ref 0.93–1.1)
LYMPHOCYTES # BLD AUTO: 0.5 10*3/MM3 (ref 0.7–3.1)
LYMPHOCYTES NFR BLD AUTO: 15.6 % (ref 19.6–45.3)
MCH RBC QN AUTO: 32.1 PG (ref 26.6–33)
MCHC RBC AUTO-ENTMCNC: 35.3 G/DL (ref 31.5–35.7)
MCV RBC AUTO: 90.8 FL (ref 79–97)
MONOCYTES # BLD AUTO: 0.38 10*3/MM3 (ref 0.1–0.9)
MONOCYTES NFR BLD AUTO: 11.8 % (ref 5–12)
NEUTROPHILS NFR BLD AUTO: 2.15 10*3/MM3 (ref 1.7–7)
NEUTROPHILS NFR BLD AUTO: 67 % (ref 42.7–76)
PLATELET # BLD AUTO: 32 10*3/MM3 (ref 140–450)
PMV BLD AUTO: 9.7 FL (ref 6–12)
PROTHROMBIN TIME: 13.2 SECONDS (ref 9.6–11.7)
RBC # BLD AUTO: 2.93 10*6/MM3 (ref 4.14–5.8)
WBC NRBC COR # BLD: 3.21 10*3/MM3 (ref 3.4–10.8)

## 2022-10-04 PROCEDURE — 80053 COMPREHEN METABOLIC PANEL: CPT

## 2022-10-04 PROCEDURE — 85025 COMPLETE CBC W/AUTO DIFF WBC: CPT

## 2022-10-04 PROCEDURE — 82077 ASSAY SPEC XCP UR&BREATH IA: CPT

## 2022-10-04 PROCEDURE — 36415 COLL VENOUS BLD VENIPUNCTURE: CPT

## 2022-10-04 PROCEDURE — 85610 PROTHROMBIN TIME: CPT

## 2022-10-05 LAB
ALBUMIN SERPL-MCNC: 3.7 G/DL (ref 3.5–5.2)
ALBUMIN/GLOB SERPL: 1.2 G/DL
ALP SERPL-CCNC: 149 U/L (ref 39–117)
ALT SERPL W P-5'-P-CCNC: 30 U/L (ref 1–41)
ANION GAP SERPL CALCULATED.3IONS-SCNC: 12.5 MMOL/L (ref 5–15)
AST SERPL-CCNC: 43 U/L (ref 1–40)
BILIRUB SERPL-MCNC: 1.3 MG/DL (ref 0–1.2)
BUN SERPL-MCNC: 24 MG/DL (ref 6–20)
BUN/CREAT SERPL: 13.1 (ref 7–25)
CALCIUM SPEC-SCNC: 9.5 MG/DL (ref 8.6–10.5)
CHLORIDE SERPL-SCNC: 102 MMOL/L (ref 98–107)
CO2 SERPL-SCNC: 20.5 MMOL/L (ref 22–29)
CREAT SERPL-MCNC: 1.83 MG/DL (ref 0.76–1.27)
EGFRCR SERPLBLD CKD-EPI 2021: 48.7 ML/MIN/1.73
GLOBULIN UR ELPH-MCNC: 3 GM/DL
GLUCOSE SERPL-MCNC: 79 MG/DL (ref 65–99)
POTASSIUM SERPL-SCNC: 5 MMOL/L (ref 3.5–5.2)
PROT SERPL-MCNC: 6.7 G/DL (ref 6–8.5)
SODIUM SERPL-SCNC: 135 MMOL/L (ref 136–145)

## 2022-10-11 ENCOUNTER — LAB (OUTPATIENT)
Dept: LAB | Facility: HOSPITAL | Age: 37
End: 2022-10-11

## 2022-10-11 DIAGNOSIS — K70.30 CIRRHOSIS, LAENNEC'S: ICD-10-CM

## 2022-10-11 LAB
ALBUMIN SERPL-MCNC: 3.6 G/DL (ref 3.5–5.2)
ALBUMIN/GLOB SERPL: 1.3 G/DL
ALP SERPL-CCNC: 133 U/L (ref 39–117)
ALT SERPL W P-5'-P-CCNC: 31 U/L (ref 1–41)
ANION GAP SERPL CALCULATED.3IONS-SCNC: 5.8 MMOL/L (ref 5–15)
AST SERPL-CCNC: 42 U/L (ref 1–40)
BASOPHILS # BLD MANUAL: 0.05 10*3/MM3 (ref 0–0.2)
BASOPHILS NFR BLD MANUAL: 2.1 % (ref 0–1.5)
BILIRUB SERPL-MCNC: 1.6 MG/DL (ref 0–1.2)
BUN SERPL-MCNC: 24 MG/DL (ref 6–20)
BUN/CREAT SERPL: 13.4 (ref 7–25)
CALCIUM SPEC-SCNC: 8.6 MG/DL (ref 8.6–10.5)
CHLORIDE SERPL-SCNC: 104 MMOL/L (ref 98–107)
CO2 SERPL-SCNC: 22.2 MMOL/L (ref 22–29)
CREAT SERPL-MCNC: 1.79 MG/DL (ref 0.76–1.27)
DEPRECATED RDW RBC AUTO: 45.5 FL (ref 37–54)
EGFRCR SERPLBLD CKD-EPI 2021: 50.1 ML/MIN/1.73
EOSINOPHIL # BLD MANUAL: 0.19 10*3/MM3 (ref 0–0.4)
EOSINOPHIL NFR BLD MANUAL: 7.3 % (ref 0.3–6.2)
ERYTHROCYTE [DISTWIDTH] IN BLOOD BY AUTOMATED COUNT: 13.4 % (ref 12.3–15.4)
ETHANOL UR QL: <0.01 %
GLOBULIN UR ELPH-MCNC: 2.7 GM/DL
GLUCOSE SERPL-MCNC: 101 MG/DL (ref 65–99)
HCT VFR BLD AUTO: 27 % (ref 37.5–51)
HGB BLD-MCNC: 9.5 G/DL (ref 13–17.7)
INR PPP: 1.35 (ref 0.93–1.1)
LYMPHOCYTES # BLD MANUAL: 0.32 10*3/MM3 (ref 0.7–3.1)
LYMPHOCYTES NFR BLD MANUAL: 13.5 % (ref 5–12)
MCH RBC QN AUTO: 32.9 PG (ref 26.6–33)
MCHC RBC AUTO-ENTMCNC: 35.2 G/DL (ref 31.5–35.7)
MCV RBC AUTO: 93.4 FL (ref 79–97)
MONOCYTES # BLD: 0.35 10*3/MM3 (ref 0.1–0.9)
NEUTROPHILS # BLD AUTO: 1.66 10*3/MM3 (ref 1.7–7)
NEUTROPHILS NFR BLD MANUAL: 64.6 % (ref 42.7–76)
PLAT MORPH BLD: NORMAL
PLATELET # BLD AUTO: 35 10*3/MM3 (ref 140–450)
PMV BLD AUTO: 9.8 FL (ref 6–12)
POTASSIUM SERPL-SCNC: 4.6 MMOL/L (ref 3.5–5.2)
PROT SERPL-MCNC: 6.3 G/DL (ref 6–8.5)
PROTHROMBIN TIME: 13.7 SECONDS (ref 9.6–11.7)
RBC # BLD AUTO: 2.89 10*6/MM3 (ref 4.14–5.8)
RBC MORPH BLD: NORMAL
SODIUM SERPL-SCNC: 132 MMOL/L (ref 136–145)
VARIANT LYMPHS NFR BLD MANUAL: 12.5 % (ref 19.6–45.3)
WBC MORPH BLD: NORMAL
WBC NRBC COR # BLD: 2.57 10*3/MM3 (ref 3.4–10.8)

## 2022-10-11 PROCEDURE — 82077 ASSAY SPEC XCP UR&BREATH IA: CPT

## 2022-10-11 PROCEDURE — 36415 COLL VENOUS BLD VENIPUNCTURE: CPT

## 2022-10-11 PROCEDURE — 80053 COMPREHEN METABOLIC PANEL: CPT

## 2022-10-11 PROCEDURE — 85610 PROTHROMBIN TIME: CPT

## 2022-10-11 PROCEDURE — 85025 COMPLETE CBC W/AUTO DIFF WBC: CPT

## 2022-10-11 PROCEDURE — 85007 BL SMEAR W/DIFF WBC COUNT: CPT

## 2022-10-17 ENCOUNTER — LAB (OUTPATIENT)
Dept: LAB | Facility: HOSPITAL | Age: 37
End: 2022-10-17

## 2022-10-17 DIAGNOSIS — K70.30 CIRRHOSIS, LAENNEC'S: ICD-10-CM

## 2022-10-17 LAB
ALBUMIN SERPL-MCNC: 3.6 G/DL (ref 3.5–5.2)
ALBUMIN/GLOB SERPL: 1.2 G/DL
ALP SERPL-CCNC: 131 U/L (ref 39–117)
ALT SERPL W P-5'-P-CCNC: 34 U/L (ref 1–41)
ANION GAP SERPL CALCULATED.3IONS-SCNC: 11.5 MMOL/L (ref 5–15)
AST SERPL-CCNC: 44 U/L (ref 1–40)
BASOPHILS # BLD AUTO: 0.01 10*3/MM3 (ref 0–0.2)
BASOPHILS NFR BLD AUTO: 0.5 % (ref 0–1.5)
BILIRUB SERPL-MCNC: 1.3 MG/DL (ref 0–1.2)
BUN SERPL-MCNC: 27 MG/DL (ref 6–20)
BUN/CREAT SERPL: 15.3 (ref 7–25)
CALCIUM SPEC-SCNC: 8.8 MG/DL (ref 8.6–10.5)
CHLORIDE SERPL-SCNC: 105 MMOL/L (ref 98–107)
CO2 SERPL-SCNC: 20.5 MMOL/L (ref 22–29)
CREAT SERPL-MCNC: 1.76 MG/DL (ref 0.76–1.27)
DEPRECATED RDW RBC AUTO: 44.2 FL (ref 37–54)
EGFRCR SERPLBLD CKD-EPI 2021: 51.1 ML/MIN/1.73
EOSINOPHIL # BLD AUTO: 0.13 10*3/MM3 (ref 0–0.4)
EOSINOPHIL NFR BLD AUTO: 5.9 % (ref 0.3–6.2)
ERYTHROCYTE [DISTWIDTH] IN BLOOD BY AUTOMATED COUNT: 13.3 % (ref 12.3–15.4)
ETHANOL UR QL: <0.01 %
GLOBULIN UR ELPH-MCNC: 3.1 GM/DL
GLUCOSE SERPL-MCNC: 90 MG/DL (ref 65–99)
HCT VFR BLD AUTO: 27.9 % (ref 37.5–51)
HGB BLD-MCNC: 9.9 G/DL (ref 13–17.7)
INR PPP: 1.32 (ref 0.93–1.1)
LYMPHOCYTES # BLD AUTO: 0.4 10*3/MM3 (ref 0.7–3.1)
LYMPHOCYTES NFR BLD AUTO: 18.2 % (ref 19.6–45.3)
MCH RBC QN AUTO: 32 PG (ref 26.6–33)
MCHC RBC AUTO-ENTMCNC: 35.5 G/DL (ref 31.5–35.7)
MCV RBC AUTO: 90.3 FL (ref 79–97)
MONOCYTES # BLD AUTO: 0.38 10*3/MM3 (ref 0.1–0.9)
MONOCYTES NFR BLD AUTO: 17.3 % (ref 5–12)
NEUTROPHILS NFR BLD AUTO: 1.27 10*3/MM3 (ref 1.7–7)
NEUTROPHILS NFR BLD AUTO: 57.6 % (ref 42.7–76)
PLATELET # BLD AUTO: 28 10*3/MM3 (ref 140–450)
PMV BLD AUTO: 10.1 FL (ref 6–12)
POTASSIUM SERPL-SCNC: 4.7 MMOL/L (ref 3.5–5.2)
PROT SERPL-MCNC: 6.7 G/DL (ref 6–8.5)
PROTHROMBIN TIME: 13.4 SECONDS (ref 9.6–11.7)
RBC # BLD AUTO: 3.09 10*6/MM3 (ref 4.14–5.8)
SODIUM SERPL-SCNC: 137 MMOL/L (ref 136–145)
WBC NRBC COR # BLD: 2.2 10*3/MM3 (ref 3.4–10.8)

## 2022-10-17 PROCEDURE — 36415 COLL VENOUS BLD VENIPUNCTURE: CPT

## 2022-10-17 PROCEDURE — 85025 COMPLETE CBC W/AUTO DIFF WBC: CPT

## 2022-10-17 PROCEDURE — 82077 ASSAY SPEC XCP UR&BREATH IA: CPT

## 2022-10-17 PROCEDURE — 85610 PROTHROMBIN TIME: CPT

## 2022-10-17 PROCEDURE — 80053 COMPREHEN METABOLIC PANEL: CPT

## 2022-10-22 DIAGNOSIS — K70.31 ALCOHOLIC CIRRHOSIS OF LIVER WITH ASCITES: Primary | ICD-10-CM

## 2022-10-22 RX ORDER — LIDOCAINE HYDROCHLORIDE AND EPINEPHRINE BITARTRATE 20; .01 MG/ML; MG/ML
10 INJECTION, SOLUTION SUBCUTANEOUS AS NEEDED
Status: CANCELLED | OUTPATIENT
Start: 2022-10-25

## 2022-10-25 ENCOUNTER — TRANSCRIBE ORDERS (OUTPATIENT)
Dept: ADMINISTRATIVE | Facility: HOSPITAL | Age: 37
End: 2022-10-25

## 2022-10-25 ENCOUNTER — HOSPITAL ENCOUNTER (OUTPATIENT)
Dept: INFUSION THERAPY | Facility: HOSPITAL | Age: 37
Discharge: HOME | End: 2022-10-25
Admitting: INTERNAL MEDICINE

## 2022-10-25 VITALS
HEART RATE: 77 BPM | OXYGEN SATURATION: 99 % | SYSTOLIC BLOOD PRESSURE: 95 MMHG | DIASTOLIC BLOOD PRESSURE: 58 MMHG | RESPIRATION RATE: 16 BRPM

## 2022-10-25 DIAGNOSIS — K70.2 ALCOHOLIC FIBROSIS AND SCLEROSIS OF LIVER: Primary | ICD-10-CM

## 2022-10-25 DIAGNOSIS — K70.2 ALCOHOLIC FIBROSIS AND SCLEROSIS OF LIVER: ICD-10-CM

## 2022-10-25 DIAGNOSIS — K70.31 ALCOHOLIC CIRRHOSIS OF LIVER WITH ASCITES: ICD-10-CM

## 2022-10-25 LAB
ALBUMIN SERPL-MCNC: 3.6 G/DL (ref 3.5–5.2)
ALBUMIN/GLOB SERPL: 1.1 G/DL
ALP SERPL-CCNC: 134 U/L (ref 39–117)
ALT SERPL W P-5'-P-CCNC: 29 U/L (ref 1–41)
ANION GAP SERPL CALCULATED.3IONS-SCNC: 10 MMOL/L (ref 5–15)
AST SERPL-CCNC: 35 U/L (ref 1–40)
BILIRUB SERPL-MCNC: 1.9 MG/DL (ref 0–1.2)
BUN SERPL-MCNC: 19 MG/DL (ref 6–20)
BUN/CREAT SERPL: 12.7 (ref 7–25)
CALCIUM SPEC-SCNC: 8.2 MG/DL (ref 8.6–10.5)
CHLORIDE SERPL-SCNC: 105 MMOL/L (ref 98–107)
CO2 SERPL-SCNC: 20 MMOL/L (ref 22–29)
CREAT SERPL-MCNC: 1.5 MG/DL (ref 0.76–1.27)
DEPRECATED RDW RBC AUTO: 48.6 FL (ref 37–54)
DEPRECATED RDW RBC AUTO: 50.3 FL (ref 37–54)
EGFRCR SERPLBLD CKD-EPI 2021: 61.9 ML/MIN/1.73
EOSINOPHIL # BLD MANUAL: 0.06 10*3/MM3 (ref 0–0.4)
EOSINOPHIL NFR BLD MANUAL: 2 % (ref 0.3–6.2)
ERYTHROCYTE [DISTWIDTH] IN BLOOD BY AUTOMATED COUNT: 14.7 % (ref 12.3–15.4)
ERYTHROCYTE [DISTWIDTH] IN BLOOD BY AUTOMATED COUNT: 14.8 % (ref 12.3–15.4)
ETHANOL UR QL: <0.01 %
GLOBULIN UR ELPH-MCNC: 3.4 GM/DL
GLUCOSE SERPL-MCNC: 92 MG/DL (ref 65–99)
HCT VFR BLD AUTO: 28.2 % (ref 37.5–51)
HCT VFR BLD AUTO: 28.8 % (ref 37.5–51)
HGB BLD-MCNC: 9.8 G/DL (ref 13–17.7)
HGB BLD-MCNC: 9.8 G/DL (ref 13–17.7)
INR PPP: 1.36 (ref 0.93–1.1)
LYMPHOCYTES # BLD MANUAL: 0.22 10*3/MM3 (ref 0.7–3.1)
LYMPHOCYTES NFR BLD MANUAL: 7 % (ref 5–12)
MCH RBC QN AUTO: 32.1 PG (ref 26.6–33)
MCH RBC QN AUTO: 32.6 PG (ref 26.6–33)
MCHC RBC AUTO-ENTMCNC: 33.8 G/DL (ref 31.5–35.7)
MCHC RBC AUTO-ENTMCNC: 34.8 G/DL (ref 31.5–35.7)
MCV RBC AUTO: 93.8 FL (ref 79–97)
MCV RBC AUTO: 94.9 FL (ref 79–97)
MONOCYTES # BLD: 0.22 10*3/MM3 (ref 0.1–0.9)
NEUTROPHILS # BLD AUTO: 2.6 10*3/MM3 (ref 1.7–7)
NEUTROPHILS NFR BLD MANUAL: 74 % (ref 42.7–76)
NEUTS BAND NFR BLD MANUAL: 10 % (ref 0–5)
PLATELET # BLD AUTO: 39 10*3/MM3 (ref 140–450)
PLATELET # BLD AUTO: 39 10*3/MM3 (ref 140–450)
PMV BLD AUTO: 7.5 FL (ref 6–12)
PMV BLD AUTO: 8.1 FL (ref 6–12)
POTASSIUM SERPL-SCNC: 4.6 MMOL/L (ref 3.5–5.2)
PROT SERPL-MCNC: 7 G/DL (ref 6–8.5)
PROTHROMBIN TIME: 13.8 SECONDS (ref 9.6–11.7)
RBC # BLD AUTO: 3.01 10*6/MM3 (ref 4.14–5.8)
RBC # BLD AUTO: 3.04 10*6/MM3 (ref 4.14–5.8)
RBC MORPH BLD: NORMAL
SCAN SLIDE: NORMAL
SMALL PLATELETS BLD QL SMEAR: ABNORMAL
SODIUM SERPL-SCNC: 135 MMOL/L (ref 136–145)
VARIANT LYMPHS NFR BLD MANUAL: 7 % (ref 19.6–45.3)
WBC MORPH BLD: NORMAL
WBC NRBC COR # BLD: 3.1 10*3/MM3 (ref 3.4–10.8)
WBC NRBC COR # BLD: 3.1 10*3/MM3 (ref 3.4–10.8)

## 2022-10-25 PROCEDURE — 80053 COMPREHEN METABOLIC PANEL: CPT | Performed by: INTERNAL MEDICINE

## 2022-10-25 PROCEDURE — 36415 COLL VENOUS BLD VENIPUNCTURE: CPT

## 2022-10-25 PROCEDURE — 82077 ASSAY SPEC XCP UR&BREATH IA: CPT | Performed by: INTERNAL MEDICINE

## 2022-10-25 PROCEDURE — G0463 HOSPITAL OUTPT CLINIC VISIT: HCPCS

## 2022-10-25 PROCEDURE — 85610 PROTHROMBIN TIME: CPT | Performed by: INTERNAL MEDICINE

## 2022-10-25 PROCEDURE — 85025 COMPLETE CBC W/AUTO DIFF WBC: CPT | Performed by: INTERNAL MEDICINE

## 2022-10-25 PROCEDURE — 85007 BL SMEAR W/DIFF WBC COUNT: CPT | Performed by: INTERNAL MEDICINE

## 2022-10-25 RX ORDER — LIDOCAINE HYDROCHLORIDE AND EPINEPHRINE BITARTRATE 20; .01 MG/ML; MG/ML
10 INJECTION, SOLUTION SUBCUTANEOUS AS NEEDED
Status: DISCONTINUED | OUTPATIENT
Start: 2022-10-25 | End: 2022-10-27 | Stop reason: HOSPADM

## 2022-10-25 NOTE — PROGRESS NOTES
Pt arrived to ASC for paracentesis.  Ultrasound of abdomen revealed small amount of ascites but no large pocket.  Manoj URENA confirmed that the patient does not have enough fluid to perform Paracentesis today.  PT. Verbalized understanding and will follow up in a week or when he is uncomfortable.

## 2022-10-25 NOTE — SIGNIFICANT NOTE
Came in for paracentesis, labs reviewed, and patient is chronically low, and his platelets appear to be at his baseline level with cirrhosis.  Patient did admit to being able to feel fluid moving in his abdomen when running.  Unfortunately, when looking at two different pockets, the peritoneal fluid is spread thin with limited space between wall and intestines with no pockets of fluid identified.  This was discussed with patient and nursing and it was mutually agreed upon to post-pone procedure until more fluid has accumulated.      Electronically signed by ROMEL Olsen, 10/25/22, 1:57 PM EDT.

## 2022-11-01 ENCOUNTER — LAB (OUTPATIENT)
Dept: LAB | Facility: HOSPITAL | Age: 37
End: 2022-11-01

## 2022-11-01 DIAGNOSIS — K70.2 ALCOHOLIC FIBROSIS AND SCLEROSIS OF LIVER: ICD-10-CM

## 2022-11-01 LAB
ALBUMIN SERPL-MCNC: 3.9 G/DL (ref 3.5–5.2)
ALBUMIN/GLOB SERPL: 1.1 G/DL
ALP SERPL-CCNC: 145 U/L (ref 39–117)
ALT SERPL W P-5'-P-CCNC: 26 U/L (ref 1–41)
ANION GAP SERPL CALCULATED.3IONS-SCNC: 10 MMOL/L (ref 5–15)
ANISOCYTOSIS BLD QL: NORMAL
AST SERPL-CCNC: 36 U/L (ref 1–40)
BASOPHILS # BLD AUTO: 0 10*3/MM3 (ref 0–0.2)
BASOPHILS NFR BLD AUTO: 0.5 % (ref 0–1.5)
BILIRUB SERPL-MCNC: 1.5 MG/DL (ref 0–1.2)
BUN SERPL-MCNC: 23 MG/DL (ref 6–20)
BUN/CREAT SERPL: 12.8 (ref 7–25)
CALCIUM SPEC-SCNC: 8.9 MG/DL (ref 8.6–10.5)
CHLORIDE SERPL-SCNC: 104 MMOL/L (ref 98–107)
CO2 SERPL-SCNC: 22 MMOL/L (ref 22–29)
CREAT SERPL-MCNC: 1.79 MG/DL (ref 0.76–1.27)
DEPRECATED RDW RBC AUTO: 47.3 FL (ref 37–54)
EGFRCR SERPLBLD CKD-EPI 2021: 49.7 ML/MIN/1.73
EOSINOPHIL # BLD AUTO: 0.1 10*3/MM3 (ref 0–0.4)
EOSINOPHIL NFR BLD AUTO: 4.4 % (ref 0.3–6.2)
ERYTHROCYTE [DISTWIDTH] IN BLOOD BY AUTOMATED COUNT: 14.4 % (ref 12.3–15.4)
ETHANOL UR QL: <0.01 %
GLOBULIN UR ELPH-MCNC: 3.4 GM/DL
GLUCOSE SERPL-MCNC: 86 MG/DL (ref 65–99)
HCT VFR BLD AUTO: 30.1 % (ref 37.5–51)
HGB BLD-MCNC: 10.3 G/DL (ref 13–17.7)
INR PPP: 1.34 (ref 0.93–1.1)
LYMPHOCYTES # BLD AUTO: 0.5 10*3/MM3 (ref 0.7–3.1)
LYMPHOCYTES NFR BLD AUTO: 15.3 % (ref 19.6–45.3)
MCH RBC QN AUTO: 32.6 PG (ref 26.6–33)
MCHC RBC AUTO-ENTMCNC: 34.3 G/DL (ref 31.5–35.7)
MCV RBC AUTO: 94.9 FL (ref 79–97)
MONOCYTES # BLD AUTO: 0.4 10*3/MM3 (ref 0.1–0.9)
MONOCYTES NFR BLD AUTO: 13.1 % (ref 5–12)
NEUTROPHILS NFR BLD AUTO: 2.2 10*3/MM3 (ref 1.7–7)
NEUTROPHILS NFR BLD AUTO: 66.7 % (ref 42.7–76)
NRBC BLD AUTO-RTO: 0 /100 WBC (ref 0–0.2)
PLATELET # BLD AUTO: 42 10*3/MM3 (ref 140–450)
PMV BLD AUTO: 7.3 FL (ref 6–12)
POTASSIUM SERPL-SCNC: 4.5 MMOL/L (ref 3.5–5.2)
PROT SERPL-MCNC: 7.3 G/DL (ref 6–8.5)
PROTHROMBIN TIME: 13.6 SECONDS (ref 9.6–11.7)
RBC # BLD AUTO: 3.18 10*6/MM3 (ref 4.14–5.8)
SMALL PLATELETS BLD QL SMEAR: NORMAL
SODIUM SERPL-SCNC: 136 MMOL/L (ref 136–145)
WBC MORPH BLD: NORMAL
WBC NRBC COR # BLD: 3.4 10*3/MM3 (ref 3.4–10.8)

## 2022-11-01 PROCEDURE — 85007 BL SMEAR W/DIFF WBC COUNT: CPT

## 2022-11-01 PROCEDURE — 82077 ASSAY SPEC XCP UR&BREATH IA: CPT

## 2022-11-01 PROCEDURE — 36415 COLL VENOUS BLD VENIPUNCTURE: CPT

## 2022-11-01 PROCEDURE — 85610 PROTHROMBIN TIME: CPT

## 2022-11-01 PROCEDURE — 80053 COMPREHEN METABOLIC PANEL: CPT

## 2022-11-01 PROCEDURE — 85025 COMPLETE CBC W/AUTO DIFF WBC: CPT

## 2022-11-08 ENCOUNTER — LAB (OUTPATIENT)
Dept: LAB | Facility: HOSPITAL | Age: 37
End: 2022-11-08

## 2022-11-08 DIAGNOSIS — K70.2 ALCOHOLIC FIBROSIS AND SCLEROSIS OF LIVER: ICD-10-CM

## 2022-11-08 LAB
ALBUMIN SERPL-MCNC: 3.5 G/DL (ref 3.5–5.2)
ALBUMIN/GLOB SERPL: 0.9 G/DL
ALP SERPL-CCNC: 136 U/L (ref 39–117)
ALT SERPL W P-5'-P-CCNC: 29 U/L (ref 1–41)
ANION GAP SERPL CALCULATED.3IONS-SCNC: 6.9 MMOL/L (ref 5–15)
AST SERPL-CCNC: 39 U/L (ref 1–40)
BASOPHILS # BLD AUTO: 0 10*3/MM3 (ref 0–0.2)
BASOPHILS NFR BLD AUTO: 0.6 % (ref 0–1.5)
BILIRUB SERPL-MCNC: 1.6 MG/DL (ref 0–1.2)
BUN SERPL-MCNC: 23 MG/DL (ref 6–20)
BUN/CREAT SERPL: 14.6 (ref 7–25)
CALCIUM SPEC-SCNC: 8.9 MG/DL (ref 8.6–10.5)
CHLORIDE SERPL-SCNC: 105 MMOL/L (ref 98–107)
CO2 SERPL-SCNC: 23.1 MMOL/L (ref 22–29)
CREAT SERPL-MCNC: 1.57 MG/DL (ref 0.76–1.27)
DEPRECATED RDW RBC AUTO: 50.8 FL (ref 37–54)
EGFRCR SERPLBLD CKD-EPI 2021: 58.2 ML/MIN/1.73
EOSINOPHIL # BLD AUTO: 0.1 10*3/MM3 (ref 0–0.4)
EOSINOPHIL NFR BLD AUTO: 4.3 % (ref 0.3–6.2)
ERYTHROCYTE [DISTWIDTH] IN BLOOD BY AUTOMATED COUNT: 14.8 % (ref 12.3–15.4)
ETHANOL UR QL: <0.01 %
GLOBULIN UR ELPH-MCNC: 3.7 GM/DL
GLUCOSE SERPL-MCNC: 101 MG/DL (ref 65–99)
HCT VFR BLD AUTO: 30.6 % (ref 37.5–51)
HGB BLD-MCNC: 10.5 G/DL (ref 13–17.7)
INR PPP: 1.32 (ref 0.93–1.1)
LYMPHOCYTES # BLD AUTO: 0.5 10*3/MM3 (ref 0.7–3.1)
LYMPHOCYTES NFR BLD AUTO: 15.9 % (ref 19.6–45.3)
MCH RBC QN AUTO: 32 PG (ref 26.6–33)
MCHC RBC AUTO-ENTMCNC: 34.4 G/DL (ref 31.5–35.7)
MCV RBC AUTO: 93 FL (ref 79–97)
MONOCYTES # BLD AUTO: 0.3 10*3/MM3 (ref 0.1–0.9)
MONOCYTES NFR BLD AUTO: 8.9 % (ref 5–12)
NEUTROPHILS NFR BLD AUTO: 2.1 10*3/MM3 (ref 1.7–7)
NEUTROPHILS NFR BLD AUTO: 70.3 % (ref 42.7–76)
NRBC BLD AUTO-RTO: 0.1 /100 WBC (ref 0–0.2)
PLATELET # BLD AUTO: 40 10*3/MM3 (ref 140–450)
PMV BLD AUTO: 7 FL (ref 6–12)
POTASSIUM SERPL-SCNC: 4.9 MMOL/L (ref 3.5–5.2)
PROT SERPL-MCNC: 7.2 G/DL (ref 6–8.5)
PROTHROMBIN TIME: 13.4 SECONDS (ref 9.6–11.7)
RBC # BLD AUTO: 3.29 10*6/MM3 (ref 4.14–5.8)
SODIUM SERPL-SCNC: 135 MMOL/L (ref 136–145)
WBC NRBC COR # BLD: 3 10*3/MM3 (ref 3.4–10.8)

## 2022-11-08 PROCEDURE — 80053 COMPREHEN METABOLIC PANEL: CPT

## 2022-11-08 PROCEDURE — 36415 COLL VENOUS BLD VENIPUNCTURE: CPT

## 2022-11-08 PROCEDURE — 82077 ASSAY SPEC XCP UR&BREATH IA: CPT

## 2022-11-08 PROCEDURE — 85610 PROTHROMBIN TIME: CPT

## 2022-11-08 PROCEDURE — 85025 COMPLETE CBC W/AUTO DIFF WBC: CPT

## 2022-11-15 ENCOUNTER — LAB (OUTPATIENT)
Dept: LAB | Facility: HOSPITAL | Age: 37
End: 2022-11-15

## 2022-11-15 DIAGNOSIS — K70.2 ALCOHOLIC FIBROSIS AND SCLEROSIS OF LIVER: ICD-10-CM

## 2022-11-15 LAB
ALBUMIN SERPL-MCNC: 3.6 G/DL (ref 3.5–5.2)
ALBUMIN/GLOB SERPL: 1.1 G/DL
ALP SERPL-CCNC: 153 U/L (ref 39–117)
ALT SERPL W P-5'-P-CCNC: 28 U/L (ref 1–41)
ANION GAP SERPL CALCULATED.3IONS-SCNC: 10 MMOL/L (ref 5–15)
ANISOCYTOSIS BLD QL: NORMAL
AST SERPL-CCNC: 37 U/L (ref 1–40)
BASOPHILS # BLD AUTO: 0 10*3/MM3 (ref 0–0.2)
BASOPHILS NFR BLD AUTO: 0.4 % (ref 0–1.5)
BILIRUB SERPL-MCNC: 1.1 MG/DL (ref 0–1.2)
BUN SERPL-MCNC: 27 MG/DL (ref 6–20)
BUN/CREAT SERPL: 18.8 (ref 7–25)
CALCIUM SPEC-SCNC: 8.7 MG/DL (ref 8.6–10.5)
CHLORIDE SERPL-SCNC: 105 MMOL/L (ref 98–107)
CO2 SERPL-SCNC: 20 MMOL/L (ref 22–29)
CREAT SERPL-MCNC: 1.44 MG/DL (ref 0.76–1.27)
DEPRECATED RDW RBC AUTO: 49 FL (ref 37–54)
EGFRCR SERPLBLD CKD-EPI 2021: 64.6 ML/MIN/1.73
EOSINOPHIL # BLD AUTO: 0.2 10*3/MM3 (ref 0–0.4)
EOSINOPHIL NFR BLD AUTO: 5.3 % (ref 0.3–6.2)
ERYTHROCYTE [DISTWIDTH] IN BLOOD BY AUTOMATED COUNT: 15 % (ref 12.3–15.4)
ETHANOL UR QL: <0.01 %
GLOBULIN UR ELPH-MCNC: 3.2 GM/DL
GLUCOSE SERPL-MCNC: 90 MG/DL (ref 65–99)
HCT VFR BLD AUTO: 30.5 % (ref 37.5–51)
HGB BLD-MCNC: 10.5 G/DL (ref 13–17.7)
INR PPP: 1.33 (ref 0.93–1.1)
LYMPHOCYTES # BLD AUTO: 0.5 10*3/MM3 (ref 0.7–3.1)
LYMPHOCYTES NFR BLD AUTO: 15.2 % (ref 19.6–45.3)
MCH RBC QN AUTO: 32.5 PG (ref 26.6–33)
MCHC RBC AUTO-ENTMCNC: 34.4 G/DL (ref 31.5–35.7)
MCV RBC AUTO: 94.5 FL (ref 79–97)
MONOCYTES # BLD AUTO: 0.4 10*3/MM3 (ref 0.1–0.9)
MONOCYTES NFR BLD AUTO: 11.8 % (ref 5–12)
NEUTROPHILS NFR BLD AUTO: 2.2 10*3/MM3 (ref 1.7–7)
NEUTROPHILS NFR BLD AUTO: 67.3 % (ref 42.7–76)
NRBC BLD AUTO-RTO: 0.1 /100 WBC (ref 0–0.2)
PLATELET # BLD AUTO: 39 10*3/MM3 (ref 140–450)
PMV BLD AUTO: 7.6 FL (ref 6–12)
POIKILOCYTOSIS BLD QL SMEAR: NORMAL
POTASSIUM SERPL-SCNC: 4.6 MMOL/L (ref 3.5–5.2)
PROT SERPL-MCNC: 6.8 G/DL (ref 6–8.5)
PROTHROMBIN TIME: 13.5 SECONDS (ref 9.6–11.7)
RBC # BLD AUTO: 3.23 10*6/MM3 (ref 4.14–5.8)
SMALL PLATELETS BLD QL SMEAR: NORMAL
SODIUM SERPL-SCNC: 135 MMOL/L (ref 136–145)
WBC MORPH BLD: NORMAL
WBC NRBC COR # BLD: 3.3 10*3/MM3 (ref 3.4–10.8)

## 2022-11-15 PROCEDURE — 82077 ASSAY SPEC XCP UR&BREATH IA: CPT

## 2022-11-15 PROCEDURE — 85610 PROTHROMBIN TIME: CPT

## 2022-11-15 PROCEDURE — 85007 BL SMEAR W/DIFF WBC COUNT: CPT

## 2022-11-15 PROCEDURE — 85025 COMPLETE CBC W/AUTO DIFF WBC: CPT

## 2022-11-15 PROCEDURE — 36415 COLL VENOUS BLD VENIPUNCTURE: CPT

## 2022-11-15 PROCEDURE — 80053 COMPREHEN METABOLIC PANEL: CPT

## 2022-11-22 ENCOUNTER — LAB (OUTPATIENT)
Dept: LAB | Facility: HOSPITAL | Age: 37
End: 2022-11-22

## 2022-11-22 DIAGNOSIS — K70.2 ALCOHOLIC FIBROSIS AND SCLEROSIS OF LIVER: ICD-10-CM

## 2022-11-22 LAB
ALBUMIN SERPL-MCNC: 3.8 G/DL (ref 3.5–5.2)
ALBUMIN/GLOB SERPL: 1 G/DL
ALP SERPL-CCNC: 151 U/L (ref 39–117)
ALT SERPL W P-5'-P-CCNC: 31 U/L (ref 1–41)
ANION GAP SERPL CALCULATED.3IONS-SCNC: 8.7 MMOL/L (ref 5–15)
AST SERPL-CCNC: 38 U/L (ref 1–40)
BASOPHILS # BLD AUTO: 0 10*3/MM3 (ref 0–0.2)
BASOPHILS NFR BLD AUTO: 0.6 % (ref 0–1.5)
BILIRUB SERPL-MCNC: 1.6 MG/DL (ref 0–1.2)
BUN SERPL-MCNC: 24 MG/DL (ref 6–20)
BUN/CREAT SERPL: 14 (ref 7–25)
CALCIUM SPEC-SCNC: 8.9 MG/DL (ref 8.6–10.5)
CHLORIDE SERPL-SCNC: 103 MMOL/L (ref 98–107)
CO2 SERPL-SCNC: 22.3 MMOL/L (ref 22–29)
CREAT SERPL-MCNC: 1.71 MG/DL (ref 0.76–1.27)
DEPRECATED RDW RBC AUTO: 49.4 FL (ref 37–54)
EGFRCR SERPLBLD CKD-EPI 2021: 52.5 ML/MIN/1.73
EOSINOPHIL # BLD AUTO: 0.2 10*3/MM3 (ref 0–0.4)
EOSINOPHIL NFR BLD AUTO: 5.7 % (ref 0.3–6.2)
ERYTHROCYTE [DISTWIDTH] IN BLOOD BY AUTOMATED COUNT: 15 % (ref 12.3–15.4)
ETHANOL UR QL: <0.01 %
GLOBULIN UR ELPH-MCNC: 3.8 GM/DL
GLUCOSE SERPL-MCNC: 88 MG/DL (ref 65–99)
HCT VFR BLD AUTO: 34 % (ref 37.5–51)
HGB BLD-MCNC: 11.3 G/DL (ref 13–17.7)
INR PPP: 1.33 (ref 0.93–1.1)
LYMPHOCYTES # BLD AUTO: 0.5 10*3/MM3 (ref 0.7–3.1)
LYMPHOCYTES NFR BLD AUTO: 14.4 % (ref 19.6–45.3)
MCH RBC QN AUTO: 31.7 PG (ref 26.6–33)
MCHC RBC AUTO-ENTMCNC: 33.2 G/DL (ref 31.5–35.7)
MCV RBC AUTO: 95.4 FL (ref 79–97)
MONOCYTES # BLD AUTO: 0.3 10*3/MM3 (ref 0.1–0.9)
MONOCYTES NFR BLD AUTO: 9.6 % (ref 5–12)
NEUTROPHILS NFR BLD AUTO: 2.4 10*3/MM3 (ref 1.7–7)
NEUTROPHILS NFR BLD AUTO: 69.7 % (ref 42.7–76)
NRBC BLD AUTO-RTO: 0 /100 WBC (ref 0–0.2)
PLATELET # BLD AUTO: 45 10*3/MM3 (ref 140–450)
PMV BLD AUTO: 7.1 FL (ref 6–12)
POTASSIUM SERPL-SCNC: 4.8 MMOL/L (ref 3.5–5.2)
PROT SERPL-MCNC: 7.6 G/DL (ref 6–8.5)
PROTHROMBIN TIME: 13.5 SECONDS (ref 9.6–11.7)
RBC # BLD AUTO: 3.57 10*6/MM3 (ref 4.14–5.8)
RBC MORPH BLD: NORMAL
SMALL PLATELETS BLD QL SMEAR: NORMAL
SODIUM SERPL-SCNC: 134 MMOL/L (ref 136–145)
WBC MORPH BLD: NORMAL
WBC NRBC COR # BLD: 3.4 10*3/MM3 (ref 3.4–10.8)

## 2022-11-22 PROCEDURE — 36415 COLL VENOUS BLD VENIPUNCTURE: CPT

## 2022-11-22 PROCEDURE — 85025 COMPLETE CBC W/AUTO DIFF WBC: CPT

## 2022-11-22 PROCEDURE — 85610 PROTHROMBIN TIME: CPT

## 2022-11-22 PROCEDURE — 82077 ASSAY SPEC XCP UR&BREATH IA: CPT

## 2022-11-22 PROCEDURE — 85007 BL SMEAR W/DIFF WBC COUNT: CPT

## 2022-11-22 PROCEDURE — 80053 COMPREHEN METABOLIC PANEL: CPT

## 2022-11-29 ENCOUNTER — LAB (OUTPATIENT)
Dept: LAB | Facility: HOSPITAL | Age: 37
End: 2022-11-29

## 2022-11-29 DIAGNOSIS — K70.2 ALCOHOLIC FIBROSIS AND SCLEROSIS OF LIVER: ICD-10-CM

## 2022-11-29 LAB
ALBUMIN SERPL-MCNC: 3.6 G/DL (ref 3.5–5.2)
ALBUMIN/GLOB SERPL: 1 G/DL
ALP SERPL-CCNC: 137 U/L (ref 39–117)
ALT SERPL W P-5'-P-CCNC: 29 U/L (ref 1–41)
ANION GAP SERPL CALCULATED.3IONS-SCNC: 8.1 MMOL/L (ref 5–15)
AST SERPL-CCNC: 36 U/L (ref 1–40)
BASOPHILS # BLD AUTO: 0 10*3/MM3 (ref 0–0.2)
BASOPHILS NFR BLD AUTO: 0.3 % (ref 0–1.5)
BILIRUB SERPL-MCNC: 1.6 MG/DL (ref 0–1.2)
BUN SERPL-MCNC: 25 MG/DL (ref 6–20)
BUN/CREAT SERPL: 14.6 (ref 7–25)
CALCIUM SPEC-SCNC: 8.9 MG/DL (ref 8.6–10.5)
CHLORIDE SERPL-SCNC: 103 MMOL/L (ref 98–107)
CO2 SERPL-SCNC: 22.9 MMOL/L (ref 22–29)
CREAT SERPL-MCNC: 1.71 MG/DL (ref 0.76–1.27)
DEPRECATED RDW RBC AUTO: 49 FL (ref 37–54)
EGFRCR SERPLBLD CKD-EPI 2021: 52.5 ML/MIN/1.73
EOSINOPHIL # BLD AUTO: 0.2 10*3/MM3 (ref 0–0.4)
EOSINOPHIL NFR BLD AUTO: 5.5 % (ref 0.3–6.2)
ERYTHROCYTE [DISTWIDTH] IN BLOOD BY AUTOMATED COUNT: 15 % (ref 12.3–15.4)
ETHANOL UR QL: <0.01 %
GLOBULIN UR ELPH-MCNC: 3.6 GM/DL
GLUCOSE SERPL-MCNC: 82 MG/DL (ref 65–99)
HCT VFR BLD AUTO: 32.5 % (ref 37.5–51)
HGB BLD-MCNC: 10.8 G/DL (ref 13–17.7)
INR PPP: 1.36 (ref 0.93–1.1)
LYMPHOCYTES # BLD AUTO: 0.5 10*3/MM3 (ref 0.7–3.1)
LYMPHOCYTES NFR BLD AUTO: 14.4 % (ref 19.6–45.3)
MCH RBC QN AUTO: 31.3 PG (ref 26.6–33)
MCHC RBC AUTO-ENTMCNC: 33.1 G/DL (ref 31.5–35.7)
MCV RBC AUTO: 94.5 FL (ref 79–97)
MONOCYTES # BLD AUTO: 0.4 10*3/MM3 (ref 0.1–0.9)
MONOCYTES NFR BLD AUTO: 10.7 % (ref 5–12)
NEUTROPHILS NFR BLD AUTO: 2.4 10*3/MM3 (ref 1.7–7)
NEUTROPHILS NFR BLD AUTO: 69.1 % (ref 42.7–76)
NRBC BLD AUTO-RTO: 0 /100 WBC (ref 0–0.2)
PLATELET # BLD AUTO: 38 10*3/MM3 (ref 140–450)
PMV BLD AUTO: 7 FL (ref 6–12)
POTASSIUM SERPL-SCNC: 4.7 MMOL/L (ref 3.5–5.2)
PROT SERPL-MCNC: 7.2 G/DL (ref 6–8.5)
PROTHROMBIN TIME: 13.8 SECONDS (ref 9.6–11.7)
RBC # BLD AUTO: 3.44 10*6/MM3 (ref 4.14–5.8)
RBC MORPH BLD: NORMAL
SMALL PLATELETS BLD QL SMEAR: NORMAL
SODIUM SERPL-SCNC: 134 MMOL/L (ref 136–145)
WBC MORPH BLD: NORMAL
WBC NRBC COR # BLD: 3.5 10*3/MM3 (ref 3.4–10.8)

## 2022-11-29 PROCEDURE — 82077 ASSAY SPEC XCP UR&BREATH IA: CPT

## 2022-11-29 PROCEDURE — 85610 PROTHROMBIN TIME: CPT

## 2022-11-29 PROCEDURE — 36415 COLL VENOUS BLD VENIPUNCTURE: CPT

## 2022-11-29 PROCEDURE — 85007 BL SMEAR W/DIFF WBC COUNT: CPT

## 2022-11-29 PROCEDURE — 85025 COMPLETE CBC W/AUTO DIFF WBC: CPT

## 2022-11-29 PROCEDURE — 80053 COMPREHEN METABOLIC PANEL: CPT

## 2022-12-06 ENCOUNTER — LAB (OUTPATIENT)
Dept: LAB | Facility: HOSPITAL | Age: 37
End: 2022-12-06

## 2022-12-06 DIAGNOSIS — K70.2 ALCOHOLIC FIBROSIS AND SCLEROSIS OF LIVER: ICD-10-CM

## 2022-12-06 LAB
ALBUMIN SERPL-MCNC: 3.9 G/DL (ref 3.5–5.2)
ALBUMIN/GLOB SERPL: 1.2 G/DL
ALP SERPL-CCNC: 141 U/L (ref 39–117)
ALT SERPL W P-5'-P-CCNC: 31 U/L (ref 1–41)
ANION GAP SERPL CALCULATED.3IONS-SCNC: 8.1 MMOL/L (ref 5–15)
AST SERPL-CCNC: 35 U/L (ref 1–40)
BASOPHILS # BLD AUTO: 0 10*3/MM3 (ref 0–0.2)
BASOPHILS NFR BLD AUTO: 0.4 % (ref 0–1.5)
BILIRUB SERPL-MCNC: 1.5 MG/DL (ref 0–1.2)
BUN SERPL-MCNC: 28 MG/DL (ref 6–20)
BUN/CREAT SERPL: 15.4 (ref 7–25)
CALCIUM SPEC-SCNC: 9 MG/DL (ref 8.6–10.5)
CHLORIDE SERPL-SCNC: 105 MMOL/L (ref 98–107)
CO2 SERPL-SCNC: 22.9 MMOL/L (ref 22–29)
CREAT SERPL-MCNC: 1.82 MG/DL (ref 0.76–1.27)
DEPRECATED RDW RBC AUTO: 49.9 FL (ref 37–54)
EGFRCR SERPLBLD CKD-EPI 2021: 48.8 ML/MIN/1.73
EOSINOPHIL # BLD AUTO: 0.2 10*3/MM3 (ref 0–0.4)
EOSINOPHIL NFR BLD AUTO: 6.3 % (ref 0.3–6.2)
ERYTHROCYTE [DISTWIDTH] IN BLOOD BY AUTOMATED COUNT: 15.2 % (ref 12.3–15.4)
ETHANOL UR QL: <0.01 %
GLOBULIN UR ELPH-MCNC: 3.3 GM/DL
GLUCOSE SERPL-MCNC: 99 MG/DL (ref 65–99)
HCT VFR BLD AUTO: 32.5 % (ref 37.5–51)
HGB BLD-MCNC: 10.9 G/DL (ref 13–17.7)
INR PPP: 1.36 (ref 0.93–1.1)
LYMPHOCYTES # BLD AUTO: 0.5 10*3/MM3 (ref 0.7–3.1)
LYMPHOCYTES NFR BLD AUTO: 14.4 % (ref 19.6–45.3)
MCH RBC QN AUTO: 31.7 PG (ref 26.6–33)
MCHC RBC AUTO-ENTMCNC: 33.6 G/DL (ref 31.5–35.7)
MCV RBC AUTO: 94.4 FL (ref 79–97)
MONOCYTES # BLD AUTO: 0.4 10*3/MM3 (ref 0.1–0.9)
MONOCYTES NFR BLD AUTO: 10.8 % (ref 5–12)
NEUTROPHILS NFR BLD AUTO: 2.3 10*3/MM3 (ref 1.7–7)
NEUTROPHILS NFR BLD AUTO: 68.1 % (ref 42.7–76)
NRBC BLD AUTO-RTO: 0 /100 WBC (ref 0–0.2)
PLATELET # BLD AUTO: 38 10*3/MM3 (ref 140–450)
PMV BLD AUTO: 7.3 FL (ref 6–12)
POTASSIUM SERPL-SCNC: 5 MMOL/L (ref 3.5–5.2)
PROT SERPL-MCNC: 7.2 G/DL (ref 6–8.5)
PROTHROMBIN TIME: 13.8 SECONDS (ref 9.6–11.7)
RBC # BLD AUTO: 3.44 10*6/MM3 (ref 4.14–5.8)
SODIUM SERPL-SCNC: 136 MMOL/L (ref 136–145)
WBC NRBC COR # BLD: 3.4 10*3/MM3 (ref 3.4–10.8)

## 2022-12-06 PROCEDURE — 36415 COLL VENOUS BLD VENIPUNCTURE: CPT

## 2022-12-06 PROCEDURE — 85025 COMPLETE CBC W/AUTO DIFF WBC: CPT

## 2022-12-06 PROCEDURE — 85610 PROTHROMBIN TIME: CPT

## 2022-12-06 PROCEDURE — 82077 ASSAY SPEC XCP UR&BREATH IA: CPT

## 2022-12-06 PROCEDURE — 80053 COMPREHEN METABOLIC PANEL: CPT

## 2022-12-13 ENCOUNTER — LAB (OUTPATIENT)
Dept: LAB | Facility: HOSPITAL | Age: 37
End: 2022-12-13

## 2022-12-13 DIAGNOSIS — K70.2 ALCOHOLIC FIBROSIS AND SCLEROSIS OF LIVER: ICD-10-CM

## 2022-12-13 LAB
ALBUMIN SERPL-MCNC: 3.8 G/DL (ref 3.5–5.2)
ALBUMIN/GLOB SERPL: 1.2 G/DL
ALP SERPL-CCNC: 149 U/L (ref 39–117)
ALT SERPL W P-5'-P-CCNC: 34 U/L (ref 1–41)
ANION GAP SERPL CALCULATED.3IONS-SCNC: 9.1 MMOL/L (ref 5–15)
AST SERPL-CCNC: 36 U/L (ref 1–40)
BASOPHILS # BLD AUTO: 0 10*3/MM3 (ref 0–0.2)
BASOPHILS NFR BLD AUTO: 0.3 % (ref 0–1.5)
BILIRUB SERPL-MCNC: 1.5 MG/DL (ref 0–1.2)
BUN SERPL-MCNC: 27 MG/DL (ref 6–20)
BUN/CREAT SERPL: 15.3 (ref 7–25)
CALCIUM SPEC-SCNC: 8.9 MG/DL (ref 8.6–10.5)
CHLORIDE SERPL-SCNC: 104 MMOL/L (ref 98–107)
CO2 SERPL-SCNC: 23.9 MMOL/L (ref 22–29)
CREAT SERPL-MCNC: 1.77 MG/DL (ref 0.76–1.27)
DEPRECATED RDW RBC AUTO: 49.4 FL (ref 37–54)
EGFRCR SERPLBLD CKD-EPI 2021: 50.4 ML/MIN/1.73
EOSINOPHIL # BLD AUTO: 0.2 10*3/MM3 (ref 0–0.4)
EOSINOPHIL NFR BLD AUTO: 5.9 % (ref 0.3–6.2)
ERYTHROCYTE [DISTWIDTH] IN BLOOD BY AUTOMATED COUNT: 15.2 % (ref 12.3–15.4)
ETHANOL UR QL: <0.01 %
GLOBULIN UR ELPH-MCNC: 3.3 GM/DL
GLUCOSE SERPL-MCNC: 89 MG/DL (ref 65–99)
HCT VFR BLD AUTO: 32 % (ref 37.5–51)
HGB BLD-MCNC: 10.7 G/DL (ref 13–17.7)
INR PPP: 1.34 (ref 0.93–1.1)
LYMPHOCYTES # BLD AUTO: 0.5 10*3/MM3 (ref 0.7–3.1)
LYMPHOCYTES NFR BLD AUTO: 15 % (ref 19.6–45.3)
MCH RBC QN AUTO: 31.7 PG (ref 26.6–33)
MCHC RBC AUTO-ENTMCNC: 33.4 G/DL (ref 31.5–35.7)
MCV RBC AUTO: 94.9 FL (ref 79–97)
MONOCYTES # BLD AUTO: 0.4 10*3/MM3 (ref 0.1–0.9)
MONOCYTES NFR BLD AUTO: 11.2 % (ref 5–12)
NEUTROPHILS NFR BLD AUTO: 2.4 10*3/MM3 (ref 1.7–7)
NEUTROPHILS NFR BLD AUTO: 67.6 % (ref 42.7–76)
NRBC BLD AUTO-RTO: 0.1 /100 WBC (ref 0–0.2)
PLATELET # BLD AUTO: 40 10*3/MM3 (ref 140–450)
PMV BLD AUTO: 7.4 FL (ref 6–12)
POTASSIUM SERPL-SCNC: 4.7 MMOL/L (ref 3.5–5.2)
PROT SERPL-MCNC: 7.1 G/DL (ref 6–8.5)
PROTHROMBIN TIME: 13.6 SECONDS (ref 9.6–11.7)
RBC # BLD AUTO: 3.37 10*6/MM3 (ref 4.14–5.8)
SODIUM SERPL-SCNC: 137 MMOL/L (ref 136–145)
WBC NRBC COR # BLD: 3.5 10*3/MM3 (ref 3.4–10.8)

## 2022-12-13 PROCEDURE — 82077 ASSAY SPEC XCP UR&BREATH IA: CPT

## 2022-12-13 PROCEDURE — 85025 COMPLETE CBC W/AUTO DIFF WBC: CPT

## 2022-12-13 PROCEDURE — 36415 COLL VENOUS BLD VENIPUNCTURE: CPT

## 2022-12-13 PROCEDURE — 85610 PROTHROMBIN TIME: CPT

## 2022-12-13 PROCEDURE — 80053 COMPREHEN METABOLIC PANEL: CPT

## 2022-12-20 ENCOUNTER — LAB (OUTPATIENT)
Dept: LAB | Facility: HOSPITAL | Age: 37
End: 2022-12-20

## 2022-12-20 DIAGNOSIS — K70.2 ALCOHOLIC FIBROSIS AND SCLEROSIS OF LIVER: ICD-10-CM

## 2022-12-20 LAB
ALBUMIN SERPL-MCNC: 3.7 G/DL (ref 3.5–5.2)
ALBUMIN/GLOB SERPL: 1.1 G/DL
ALP SERPL-CCNC: 166 U/L (ref 39–117)
ALT SERPL W P-5'-P-CCNC: 38 U/L (ref 1–41)
ANION GAP SERPL CALCULATED.3IONS-SCNC: 6 MMOL/L (ref 5–15)
AST SERPL-CCNC: 41 U/L (ref 1–40)
BASOPHILS # BLD AUTO: 0 10*3/MM3 (ref 0–0.2)
BASOPHILS NFR BLD AUTO: 0.4 % (ref 0–1.5)
BILIRUB SERPL-MCNC: 1.5 MG/DL (ref 0–1.2)
BUN SERPL-MCNC: 27 MG/DL (ref 6–20)
BUN/CREAT SERPL: 14.9 (ref 7–25)
CALCIUM SPEC-SCNC: 8.9 MG/DL (ref 8.6–10.5)
CHLORIDE SERPL-SCNC: 106 MMOL/L (ref 98–107)
CO2 SERPL-SCNC: 24 MMOL/L (ref 22–29)
CREAT SERPL-MCNC: 1.81 MG/DL (ref 0.76–1.27)
DEPRECATED RDW RBC AUTO: 48.1 FL (ref 37–54)
EGFRCR SERPLBLD CKD-EPI 2021: 49.1 ML/MIN/1.73
EOSINOPHIL # BLD AUTO: 0.2 10*3/MM3 (ref 0–0.4)
EOSINOPHIL NFR BLD AUTO: 4.8 % (ref 0.3–6.2)
ERYTHROCYTE [DISTWIDTH] IN BLOOD BY AUTOMATED COUNT: 14.9 % (ref 12.3–15.4)
ETHANOL UR QL: <0.01 %
GLOBULIN UR ELPH-MCNC: 3.4 GM/DL
GLUCOSE SERPL-MCNC: 92 MG/DL (ref 65–99)
HCT VFR BLD AUTO: 32.3 % (ref 37.5–51)
HGB BLD-MCNC: 10.8 G/DL (ref 13–17.7)
INR PPP: 1.34 (ref 0.93–1.1)
LYMPHOCYTES # BLD AUTO: 0.5 10*3/MM3 (ref 0.7–3.1)
LYMPHOCYTES NFR BLD AUTO: 14.3 % (ref 19.6–45.3)
MCH RBC QN AUTO: 31.3 PG (ref 26.6–33)
MCHC RBC AUTO-ENTMCNC: 33.5 G/DL (ref 31.5–35.7)
MCV RBC AUTO: 93.6 FL (ref 79–97)
MONOCYTES # BLD AUTO: 0.4 10*3/MM3 (ref 0.1–0.9)
MONOCYTES NFR BLD AUTO: 10.9 % (ref 5–12)
NEUTROPHILS NFR BLD AUTO: 2.4 10*3/MM3 (ref 1.7–7)
NEUTROPHILS NFR BLD AUTO: 69.6 % (ref 42.7–76)
NRBC BLD AUTO-RTO: 0 /100 WBC (ref 0–0.2)
PLATELET # BLD AUTO: 39 10*3/MM3 (ref 140–450)
PMV BLD AUTO: 7.4 FL (ref 6–12)
POTASSIUM SERPL-SCNC: 4.7 MMOL/L (ref 3.5–5.2)
PROT SERPL-MCNC: 7.1 G/DL (ref 6–8.5)
PROTHROMBIN TIME: 13.6 SECONDS (ref 9.6–11.7)
RBC # BLD AUTO: 3.45 10*6/MM3 (ref 4.14–5.8)
SODIUM SERPL-SCNC: 136 MMOL/L (ref 136–145)
WBC NRBC COR # BLD: 3.4 10*3/MM3 (ref 3.4–10.8)

## 2022-12-20 PROCEDURE — 36415 COLL VENOUS BLD VENIPUNCTURE: CPT

## 2022-12-20 PROCEDURE — 85025 COMPLETE CBC W/AUTO DIFF WBC: CPT

## 2022-12-20 PROCEDURE — 85610 PROTHROMBIN TIME: CPT

## 2022-12-20 PROCEDURE — 80053 COMPREHEN METABOLIC PANEL: CPT

## 2022-12-20 PROCEDURE — 82077 ASSAY SPEC XCP UR&BREATH IA: CPT

## 2022-12-27 ENCOUNTER — LAB (OUTPATIENT)
Dept: LAB | Facility: HOSPITAL | Age: 37
End: 2022-12-27

## 2022-12-27 DIAGNOSIS — K70.2 ALCOHOLIC FIBROSIS AND SCLEROSIS OF LIVER: ICD-10-CM

## 2022-12-27 LAB
ALBUMIN SERPL-MCNC: 3.9 G/DL (ref 3.5–5.2)
ALBUMIN/GLOB SERPL: 1.4 G/DL
ALP SERPL-CCNC: 174 U/L (ref 39–117)
ALT SERPL W P-5'-P-CCNC: 36 U/L (ref 1–41)
ANION GAP SERPL CALCULATED.3IONS-SCNC: 10.4 MMOL/L (ref 5–15)
AST SERPL-CCNC: 38 U/L (ref 1–40)
BASOPHILS # BLD AUTO: 0 10*3/MM3 (ref 0–0.2)
BASOPHILS NFR BLD AUTO: 0.5 % (ref 0–1.5)
BILIRUB SERPL-MCNC: 1.3 MG/DL (ref 0–1.2)
BUN SERPL-MCNC: 31 MG/DL (ref 6–20)
BUN/CREAT SERPL: 17.2 (ref 7–25)
CALCIUM SPEC-SCNC: 8.6 MG/DL (ref 8.6–10.5)
CHLORIDE SERPL-SCNC: 102 MMOL/L (ref 98–107)
CO2 SERPL-SCNC: 23.6 MMOL/L (ref 22–29)
CREAT SERPL-MCNC: 1.8 MG/DL (ref 0.76–1.27)
DEPRECATED RDW RBC AUTO: 50.3 FL (ref 37–54)
EGFRCR SERPLBLD CKD-EPI 2021: 49.4 ML/MIN/1.73
EOSINOPHIL # BLD AUTO: 0.2 10*3/MM3 (ref 0–0.4)
EOSINOPHIL NFR BLD AUTO: 4.9 % (ref 0.3–6.2)
ERYTHROCYTE [DISTWIDTH] IN BLOOD BY AUTOMATED COUNT: 15 % (ref 12.3–15.4)
ETHANOL UR QL: <0.01 %
GLOBULIN UR ELPH-MCNC: 2.7 GM/DL
GLUCOSE SERPL-MCNC: 85 MG/DL (ref 65–99)
HCT VFR BLD AUTO: 29.1 % (ref 37.5–51)
HGB BLD-MCNC: 9.9 G/DL (ref 13–17.7)
INR PPP: 1.36 (ref 0.93–1.1)
LYMPHOCYTES # BLD AUTO: 0.5 10*3/MM3 (ref 0.7–3.1)
LYMPHOCYTES NFR BLD AUTO: 14.8 % (ref 19.6–45.3)
MCH RBC QN AUTO: 31.2 PG (ref 26.6–33)
MCHC RBC AUTO-ENTMCNC: 34 G/DL (ref 31.5–35.7)
MCV RBC AUTO: 92 FL (ref 79–97)
MONOCYTES # BLD AUTO: 0.4 10*3/MM3 (ref 0.1–0.9)
MONOCYTES NFR BLD AUTO: 12 % (ref 5–12)
NEUTROPHILS NFR BLD AUTO: 2.4 10*3/MM3 (ref 1.7–7)
NEUTROPHILS NFR BLD AUTO: 67.8 % (ref 42.7–76)
NRBC BLD AUTO-RTO: 0 /100 WBC (ref 0–0.2)
PLATELET # BLD AUTO: 36 10*3/MM3 (ref 140–450)
PMV BLD AUTO: 7.4 FL (ref 6–12)
POTASSIUM SERPL-SCNC: 4.7 MMOL/L (ref 3.5–5.2)
PROT SERPL-MCNC: 6.6 G/DL (ref 6–8.5)
PROTHROMBIN TIME: 13.8 SECONDS (ref 9.6–11.7)
RBC # BLD AUTO: 3.16 10*6/MM3 (ref 4.14–5.8)
RBC MORPH BLD: NORMAL
SMALL PLATELETS BLD QL SMEAR: NORMAL
SODIUM SERPL-SCNC: 136 MMOL/L (ref 136–145)
WBC MORPH BLD: NORMAL
WBC NRBC COR # BLD: 3.6 10*3/MM3 (ref 3.4–10.8)

## 2022-12-27 PROCEDURE — 85025 COMPLETE CBC W/AUTO DIFF WBC: CPT

## 2022-12-27 PROCEDURE — 80053 COMPREHEN METABOLIC PANEL: CPT

## 2022-12-27 PROCEDURE — 82077 ASSAY SPEC XCP UR&BREATH IA: CPT

## 2022-12-27 PROCEDURE — 36415 COLL VENOUS BLD VENIPUNCTURE: CPT

## 2022-12-27 PROCEDURE — 85007 BL SMEAR W/DIFF WBC COUNT: CPT

## 2022-12-27 PROCEDURE — 85610 PROTHROMBIN TIME: CPT

## 2023-01-03 ENCOUNTER — LAB (OUTPATIENT)
Dept: LAB | Facility: HOSPITAL | Age: 38
End: 2023-01-03
Payer: COMMERCIAL

## 2023-01-03 DIAGNOSIS — K70.2 ALCOHOLIC FIBROSIS AND SCLEROSIS OF LIVER: ICD-10-CM

## 2023-01-03 LAB
ALBUMIN SERPL-MCNC: 3.8 G/DL (ref 3.5–5.2)
ALBUMIN/GLOB SERPL: 1.1 G/DL
ALP SERPL-CCNC: 162 U/L (ref 39–117)
ALT SERPL W P-5'-P-CCNC: 33 U/L (ref 1–41)
ANION GAP SERPL CALCULATED.3IONS-SCNC: 7 MMOL/L (ref 5–15)
AST SERPL-CCNC: 37 U/L (ref 1–40)
BASOPHILS # BLD AUTO: 0 10*3/MM3 (ref 0–0.2)
BASOPHILS NFR BLD AUTO: 0.5 % (ref 0–1.5)
BILIRUB SERPL-MCNC: 1.6 MG/DL (ref 0–1.2)
BUN SERPL-MCNC: 31 MG/DL (ref 6–20)
BUN/CREAT SERPL: 17.3 (ref 7–25)
CALCIUM SPEC-SCNC: 8.8 MG/DL (ref 8.6–10.5)
CHLORIDE SERPL-SCNC: 108 MMOL/L (ref 98–107)
CO2 SERPL-SCNC: 22 MMOL/L (ref 22–29)
CREAT SERPL-MCNC: 1.79 MG/DL (ref 0.76–1.27)
DEPRECATED RDW RBC AUTO: 50.3 FL (ref 37–54)
EGFRCR SERPLBLD CKD-EPI 2021: 49.7 ML/MIN/1.73
EOSINOPHIL # BLD AUTO: 0.2 10*3/MM3 (ref 0–0.4)
EOSINOPHIL NFR BLD AUTO: 5 % (ref 0.3–6.2)
ERYTHROCYTE [DISTWIDTH] IN BLOOD BY AUTOMATED COUNT: 15.4 % (ref 12.3–15.4)
ETHANOL UR QL: <0.01 %
GLOBULIN UR ELPH-MCNC: 3.5 GM/DL
GLUCOSE SERPL-MCNC: 85 MG/DL (ref 65–99)
HCT VFR BLD AUTO: 31.7 % (ref 37.5–51)
HGB BLD-MCNC: 10.6 G/DL (ref 13–17.7)
INR PPP: 1.31 (ref 0.93–1.1)
LYMPHOCYTES # BLD AUTO: 0.5 10*3/MM3 (ref 0.7–3.1)
LYMPHOCYTES NFR BLD AUTO: 15.4 % (ref 19.6–45.3)
MCH RBC QN AUTO: 31.5 PG (ref 26.6–33)
MCHC RBC AUTO-ENTMCNC: 33.4 G/DL (ref 31.5–35.7)
MCV RBC AUTO: 94.3 FL (ref 79–97)
MONOCYTES # BLD AUTO: 0.4 10*3/MM3 (ref 0.1–0.9)
MONOCYTES NFR BLD AUTO: 11.9 % (ref 5–12)
NEUTROPHILS NFR BLD AUTO: 2.3 10*3/MM3 (ref 1.7–7)
NEUTROPHILS NFR BLD AUTO: 67.2 % (ref 42.7–76)
NRBC BLD AUTO-RTO: 0 /100 WBC (ref 0–0.2)
PLATELET # BLD AUTO: 34 10*3/MM3 (ref 140–450)
PMV BLD AUTO: 7.6 FL (ref 6–12)
POTASSIUM SERPL-SCNC: 4.8 MMOL/L (ref 3.5–5.2)
PROT SERPL-MCNC: 7.3 G/DL (ref 6–8.5)
PROTHROMBIN TIME: 13.3 SECONDS (ref 9.6–11.7)
RBC # BLD AUTO: 3.36 10*6/MM3 (ref 4.14–5.8)
SODIUM SERPL-SCNC: 137 MMOL/L (ref 136–145)
WBC NRBC COR # BLD: 3.4 10*3/MM3 (ref 3.4–10.8)

## 2023-01-03 PROCEDURE — 36415 COLL VENOUS BLD VENIPUNCTURE: CPT

## 2023-01-03 PROCEDURE — 85610 PROTHROMBIN TIME: CPT

## 2023-01-03 PROCEDURE — 80053 COMPREHEN METABOLIC PANEL: CPT

## 2023-01-03 PROCEDURE — 82077 ASSAY SPEC XCP UR&BREATH IA: CPT

## 2023-01-03 PROCEDURE — 85025 COMPLETE CBC W/AUTO DIFF WBC: CPT

## 2023-01-10 ENCOUNTER — LAB (OUTPATIENT)
Dept: LAB | Facility: HOSPITAL | Age: 38
End: 2023-01-10
Payer: COMMERCIAL

## 2023-01-10 DIAGNOSIS — K70.2 ALCOHOLIC FIBROSIS AND SCLEROSIS OF LIVER: ICD-10-CM

## 2023-01-10 LAB
ALBUMIN SERPL-MCNC: 3.6 G/DL (ref 3.5–5.2)
ALBUMIN/GLOB SERPL: 1.1 G/DL
ALP SERPL-CCNC: 151 U/L (ref 39–117)
ALT SERPL W P-5'-P-CCNC: 34 U/L (ref 1–41)
ANION GAP SERPL CALCULATED.3IONS-SCNC: 7 MMOL/L (ref 5–15)
AST SERPL-CCNC: 38 U/L (ref 1–40)
BASOPHILS # BLD AUTO: 0 10*3/MM3 (ref 0–0.2)
BASOPHILS NFR BLD AUTO: 0.3 % (ref 0–1.5)
BILIRUB SERPL-MCNC: 1.6 MG/DL (ref 0–1.2)
BUN SERPL-MCNC: 25 MG/DL (ref 6–20)
BUN/CREAT SERPL: 15.2 (ref 7–25)
CALCIUM SPEC-SCNC: 8.7 MG/DL (ref 8.6–10.5)
CHLORIDE SERPL-SCNC: 106 MMOL/L (ref 98–107)
CO2 SERPL-SCNC: 21 MMOL/L (ref 22–29)
CREAT SERPL-MCNC: 1.65 MG/DL (ref 0.76–1.27)
DEPRECATED RDW RBC AUTO: 52.1 FL (ref 37–54)
EGFRCR SERPLBLD CKD-EPI 2021: 54.8 ML/MIN/1.73
EOSINOPHIL # BLD AUTO: 0.2 10*3/MM3 (ref 0–0.4)
EOSINOPHIL NFR BLD AUTO: 5.8 % (ref 0.3–6.2)
ERYTHROCYTE [DISTWIDTH] IN BLOOD BY AUTOMATED COUNT: 15.2 % (ref 12.3–15.4)
ETHANOL UR QL: <0.01 %
GLOBULIN UR ELPH-MCNC: 3.4 GM/DL
GLUCOSE SERPL-MCNC: 90 MG/DL (ref 65–99)
HCT VFR BLD AUTO: 29.4 % (ref 37.5–51)
HGB BLD-MCNC: 10.1 G/DL (ref 13–17.7)
INR PPP: 1.38 (ref 0.93–1.1)
LYMPHOCYTES # BLD AUTO: 0.6 10*3/MM3 (ref 0.7–3.1)
LYMPHOCYTES NFR BLD AUTO: 16.9 % (ref 19.6–45.3)
MCH RBC QN AUTO: 31.8 PG (ref 26.6–33)
MCHC RBC AUTO-ENTMCNC: 34.4 G/DL (ref 31.5–35.7)
MCV RBC AUTO: 92.4 FL (ref 79–97)
MONOCYTES # BLD AUTO: 0.5 10*3/MM3 (ref 0.1–0.9)
MONOCYTES NFR BLD AUTO: 12.8 % (ref 5–12)
NEUTROPHILS NFR BLD AUTO: 2.3 10*3/MM3 (ref 1.7–7)
NEUTROPHILS NFR BLD AUTO: 64.2 % (ref 42.7–76)
NRBC BLD AUTO-RTO: 0.1 /100 WBC (ref 0–0.2)
PLATELET # BLD AUTO: 37 10*3/MM3 (ref 140–450)
PMV BLD AUTO: 7.2 FL (ref 6–12)
POTASSIUM SERPL-SCNC: 4.2 MMOL/L (ref 3.5–5.2)
PROT SERPL-MCNC: 7 G/DL (ref 6–8.5)
PROTHROMBIN TIME: 14 SECONDS (ref 9.6–11.7)
RBC # BLD AUTO: 3.18 10*6/MM3 (ref 4.14–5.8)
RBC MORPH BLD: NORMAL
SMALL PLATELETS BLD QL SMEAR: NORMAL
SODIUM SERPL-SCNC: 134 MMOL/L (ref 136–145)
WBC MORPH BLD: NORMAL
WBC NRBC COR # BLD: 3.5 10*3/MM3 (ref 3.4–10.8)

## 2023-01-10 PROCEDURE — 36415 COLL VENOUS BLD VENIPUNCTURE: CPT

## 2023-01-10 PROCEDURE — 85610 PROTHROMBIN TIME: CPT

## 2023-01-10 PROCEDURE — 80053 COMPREHEN METABOLIC PANEL: CPT

## 2023-01-10 PROCEDURE — 82077 ASSAY SPEC XCP UR&BREATH IA: CPT

## 2023-01-10 PROCEDURE — 85025 COMPLETE CBC W/AUTO DIFF WBC: CPT

## 2023-01-10 PROCEDURE — 85007 BL SMEAR W/DIFF WBC COUNT: CPT

## 2023-01-17 ENCOUNTER — LAB (OUTPATIENT)
Dept: LAB | Facility: HOSPITAL | Age: 38
End: 2023-01-17
Payer: COMMERCIAL

## 2023-01-17 DIAGNOSIS — K70.2 ALCOHOLIC FIBROSIS AND SCLEROSIS OF LIVER: ICD-10-CM

## 2023-01-17 LAB
ALBUMIN SERPL-MCNC: 3.6 G/DL (ref 3.5–5.2)
ALBUMIN/GLOB SERPL: 1 G/DL
ALP SERPL-CCNC: 191 U/L (ref 39–117)
ALT SERPL W P-5'-P-CCNC: 34 U/L (ref 1–41)
ANION GAP SERPL CALCULATED.3IONS-SCNC: 9 MMOL/L (ref 5–15)
AST SERPL-CCNC: 37 U/L (ref 1–40)
BASOPHILS # BLD AUTO: 0 10*3/MM3 (ref 0–0.2)
BASOPHILS NFR BLD AUTO: 0.4 % (ref 0–1.5)
BILIRUB SERPL-MCNC: 1.3 MG/DL (ref 0–1.2)
BUN SERPL-MCNC: 31 MG/DL (ref 6–20)
BUN/CREAT SERPL: 16.7 (ref 7–25)
CALCIUM SPEC-SCNC: 8.9 MG/DL (ref 8.6–10.5)
CHLORIDE SERPL-SCNC: 107 MMOL/L (ref 98–107)
CO2 SERPL-SCNC: 21 MMOL/L (ref 22–29)
CREAT SERPL-MCNC: 1.86 MG/DL (ref 0.76–1.27)
DEPRECATED RDW RBC AUTO: 52.1 FL (ref 37–54)
EGFRCR SERPLBLD CKD-EPI 2021: 47.5 ML/MIN/1.73
EOSINOPHIL # BLD AUTO: 0.2 10*3/MM3 (ref 0–0.4)
EOSINOPHIL NFR BLD AUTO: 5.3 % (ref 0.3–6.2)
ERYTHROCYTE [DISTWIDTH] IN BLOOD BY AUTOMATED COUNT: 15.3 % (ref 12.3–15.4)
ETHANOL UR QL: <0.01 %
GLOBULIN UR ELPH-MCNC: 3.5 GM/DL
GLUCOSE SERPL-MCNC: 103 MG/DL (ref 65–99)
HCT VFR BLD AUTO: 31.3 % (ref 37.5–51)
HGB BLD-MCNC: 10.8 G/DL (ref 13–17.7)
INR PPP: 1.38 (ref 0.93–1.1)
LYMPHOCYTES # BLD AUTO: 0.6 10*3/MM3 (ref 0.7–3.1)
LYMPHOCYTES NFR BLD AUTO: 16.3 % (ref 19.6–45.3)
MCH RBC QN AUTO: 32 PG (ref 26.6–33)
MCHC RBC AUTO-ENTMCNC: 34.6 G/DL (ref 31.5–35.7)
MCV RBC AUTO: 92.5 FL (ref 79–97)
MONOCYTES # BLD AUTO: 0.5 10*3/MM3 (ref 0.1–0.9)
MONOCYTES NFR BLD AUTO: 12.7 % (ref 5–12)
NEUTROPHILS NFR BLD AUTO: 2.6 10*3/MM3 (ref 1.7–7)
NEUTROPHILS NFR BLD AUTO: 65.3 % (ref 42.7–76)
NRBC BLD AUTO-RTO: 0.1 /100 WBC (ref 0–0.2)
PLATELET # BLD AUTO: 37 10*3/MM3 (ref 140–450)
PMV BLD AUTO: 7.3 FL (ref 6–12)
POTASSIUM SERPL-SCNC: 4.6 MMOL/L (ref 3.5–5.2)
PROT SERPL-MCNC: 7.1 G/DL (ref 6–8.5)
PROTHROMBIN TIME: 14 SECONDS (ref 9.6–11.7)
RBC # BLD AUTO: 3.39 10*6/MM3 (ref 4.14–5.8)
SODIUM SERPL-SCNC: 137 MMOL/L (ref 136–145)
WBC NRBC COR # BLD: 4 10*3/MM3 (ref 3.4–10.8)

## 2023-01-17 PROCEDURE — 36415 COLL VENOUS BLD VENIPUNCTURE: CPT

## 2023-01-17 PROCEDURE — 85025 COMPLETE CBC W/AUTO DIFF WBC: CPT

## 2023-01-17 PROCEDURE — 85610 PROTHROMBIN TIME: CPT

## 2023-01-17 PROCEDURE — 82077 ASSAY SPEC XCP UR&BREATH IA: CPT

## 2023-01-17 PROCEDURE — 80053 COMPREHEN METABOLIC PANEL: CPT

## 2023-01-24 ENCOUNTER — LAB (OUTPATIENT)
Dept: LAB | Facility: HOSPITAL | Age: 38
End: 2023-01-24
Payer: COMMERCIAL

## 2023-01-24 DIAGNOSIS — K70.2 ALCOHOLIC FIBROSIS AND SCLEROSIS OF LIVER: ICD-10-CM

## 2023-01-24 LAB
ALBUMIN SERPL-MCNC: 3.8 G/DL (ref 3.5–5.2)
ALBUMIN/GLOB SERPL: 1.2 G/DL
ALP SERPL-CCNC: 157 U/L (ref 39–117)
ALT SERPL W P-5'-P-CCNC: 39 U/L (ref 1–41)
ANION GAP SERPL CALCULATED.3IONS-SCNC: 8.1 MMOL/L (ref 5–15)
AST SERPL-CCNC: 37 U/L (ref 1–40)
BASOPHILS # BLD AUTO: 0 10*3/MM3 (ref 0–0.2)
BASOPHILS NFR BLD AUTO: 0.5 % (ref 0–1.5)
BILIRUB SERPL-MCNC: 1.8 MG/DL (ref 0–1.2)
BUN SERPL-MCNC: 25 MG/DL (ref 6–20)
BUN/CREAT SERPL: 14.9 (ref 7–25)
CALCIUM SPEC-SCNC: 8.9 MG/DL (ref 8.6–10.5)
CHLORIDE SERPL-SCNC: 104 MMOL/L (ref 98–107)
CO2 SERPL-SCNC: 23.9 MMOL/L (ref 22–29)
CREAT SERPL-MCNC: 1.68 MG/DL (ref 0.76–1.27)
DEPRECATED RDW RBC AUTO: 49 FL (ref 37–54)
EGFRCR SERPLBLD CKD-EPI 2021: 53.7 ML/MIN/1.73
EOSINOPHIL # BLD AUTO: 0.2 10*3/MM3 (ref 0–0.4)
EOSINOPHIL NFR BLD AUTO: 4.1 % (ref 0.3–6.2)
ERYTHROCYTE [DISTWIDTH] IN BLOOD BY AUTOMATED COUNT: 15.1 % (ref 12.3–15.4)
ETHANOL UR QL: <0.01 %
GLOBULIN UR ELPH-MCNC: 3.3 GM/DL
GLUCOSE SERPL-MCNC: 100 MG/DL (ref 65–99)
HCT VFR BLD AUTO: 32.5 % (ref 37.5–51)
HGB BLD-MCNC: 10.9 G/DL (ref 13–17.7)
INR PPP: 1.35 (ref 0.93–1.1)
LYMPHOCYTES # BLD AUTO: 0.5 10*3/MM3 (ref 0.7–3.1)
LYMPHOCYTES NFR BLD AUTO: 13.6 % (ref 19.6–45.3)
MCH RBC QN AUTO: 31.5 PG (ref 26.6–33)
MCHC RBC AUTO-ENTMCNC: 33.4 G/DL (ref 31.5–35.7)
MCV RBC AUTO: 94.4 FL (ref 79–97)
MONOCYTES # BLD AUTO: 0.5 10*3/MM3 (ref 0.1–0.9)
MONOCYTES NFR BLD AUTO: 12.3 % (ref 5–12)
NEUTROPHILS NFR BLD AUTO: 2.7 10*3/MM3 (ref 1.7–7)
NEUTROPHILS NFR BLD AUTO: 69.5 % (ref 42.7–76)
NRBC BLD AUTO-RTO: 0 /100 WBC (ref 0–0.2)
PLATELET # BLD AUTO: 41 10*3/MM3 (ref 140–450)
PMV BLD AUTO: 7.5 FL (ref 6–12)
POTASSIUM SERPL-SCNC: 4.4 MMOL/L (ref 3.5–5.2)
PROT SERPL-MCNC: 7.1 G/DL (ref 6–8.5)
PROTHROMBIN TIME: 13.7 SECONDS (ref 9.6–11.7)
RBC # BLD AUTO: 3.45 10*6/MM3 (ref 4.14–5.8)
SODIUM SERPL-SCNC: 136 MMOL/L (ref 136–145)
WBC NRBC COR # BLD: 3.9 10*3/MM3 (ref 3.4–10.8)

## 2023-01-24 PROCEDURE — 85610 PROTHROMBIN TIME: CPT

## 2023-01-24 PROCEDURE — 80053 COMPREHEN METABOLIC PANEL: CPT

## 2023-01-24 PROCEDURE — 36415 COLL VENOUS BLD VENIPUNCTURE: CPT

## 2023-01-24 PROCEDURE — 82077 ASSAY SPEC XCP UR&BREATH IA: CPT

## 2023-01-24 PROCEDURE — 85025 COMPLETE CBC W/AUTO DIFF WBC: CPT

## 2023-02-07 ENCOUNTER — LAB (OUTPATIENT)
Dept: LAB | Facility: HOSPITAL | Age: 38
End: 2023-02-07
Payer: COMMERCIAL

## 2023-02-07 DIAGNOSIS — K70.2 ALCOHOLIC FIBROSIS AND SCLEROSIS OF LIVER: ICD-10-CM

## 2023-02-07 LAB
ALBUMIN SERPL-MCNC: 3.8 G/DL (ref 3.5–5.2)
ALBUMIN/GLOB SERPL: 1.1 G/DL
ALP SERPL-CCNC: 151 U/L (ref 39–117)
ALT SERPL W P-5'-P-CCNC: 34 U/L (ref 1–41)
ANION GAP SERPL CALCULATED.3IONS-SCNC: 7 MMOL/L (ref 5–15)
AST SERPL-CCNC: 34 U/L (ref 1–40)
BILIRUB SERPL-MCNC: 1.7 MG/DL (ref 0–1.2)
BUN SERPL-MCNC: 25 MG/DL (ref 6–20)
BUN/CREAT SERPL: 15.2 (ref 7–25)
CALCIUM SPEC-SCNC: 9 MG/DL (ref 8.6–10.5)
CHLORIDE SERPL-SCNC: 105 MMOL/L (ref 98–107)
CO2 SERPL-SCNC: 22 MMOL/L (ref 22–29)
CREAT SERPL-MCNC: 1.65 MG/DL (ref 0.76–1.27)
EGFRCR SERPLBLD CKD-EPI 2021: 54.8 ML/MIN/1.73
ETHANOL UR QL: <0.01 %
GLOBULIN UR ELPH-MCNC: 3.4 GM/DL
GLUCOSE SERPL-MCNC: 95 MG/DL (ref 65–99)
INR PPP: 1.34 (ref 0.93–1.1)
POTASSIUM SERPL-SCNC: 4.7 MMOL/L (ref 3.5–5.2)
PROT SERPL-MCNC: 7.2 G/DL (ref 6–8.5)
PROTHROMBIN TIME: 13.6 SECONDS (ref 9.6–11.7)
SODIUM SERPL-SCNC: 134 MMOL/L (ref 136–145)

## 2023-02-07 PROCEDURE — 82077 ASSAY SPEC XCP UR&BREATH IA: CPT

## 2023-02-07 PROCEDURE — 80053 COMPREHEN METABOLIC PANEL: CPT

## 2023-02-07 PROCEDURE — 36415 COLL VENOUS BLD VENIPUNCTURE: CPT

## 2023-02-07 PROCEDURE — 85610 PROTHROMBIN TIME: CPT

## 2023-02-14 ENCOUNTER — LAB (OUTPATIENT)
Dept: LAB | Facility: HOSPITAL | Age: 38
End: 2023-02-14
Payer: COMMERCIAL

## 2023-02-14 DIAGNOSIS — K70.2 ALCOHOLIC FIBROSIS AND SCLEROSIS OF LIVER: ICD-10-CM

## 2023-02-14 LAB
ALBUMIN SERPL-MCNC: 3.5 G/DL (ref 3.5–5.2)
ALBUMIN/GLOB SERPL: 1 G/DL
ALP SERPL-CCNC: 150 U/L (ref 39–117)
ALT SERPL W P-5'-P-CCNC: 32 U/L (ref 1–41)
ANION GAP SERPL CALCULATED.3IONS-SCNC: 9 MMOL/L (ref 5–15)
AST SERPL-CCNC: 38 U/L (ref 1–40)
BASOPHILS # BLD AUTO: 0 10*3/MM3 (ref 0–0.2)
BASOPHILS NFR BLD AUTO: 0.4 % (ref 0–1.5)
BILIRUB SERPL-MCNC: 1.8 MG/DL (ref 0–1.2)
BUN SERPL-MCNC: 27 MG/DL (ref 6–20)
BUN/CREAT SERPL: 16 (ref 7–25)
CALCIUM SPEC-SCNC: 8.8 MG/DL (ref 8.6–10.5)
CHLORIDE SERPL-SCNC: 105 MMOL/L (ref 98–107)
CO2 SERPL-SCNC: 22 MMOL/L (ref 22–29)
CREAT SERPL-MCNC: 1.69 MG/DL (ref 0.76–1.27)
DEPRECATED RDW RBC AUTO: 51.6 FL (ref 37–54)
EGFRCR SERPLBLD CKD-EPI 2021: 53.3 ML/MIN/1.73
EOSINOPHIL # BLD AUTO: 0.1 10*3/MM3 (ref 0–0.4)
EOSINOPHIL NFR BLD AUTO: 4.2 % (ref 0.3–6.2)
ERYTHROCYTE [DISTWIDTH] IN BLOOD BY AUTOMATED COUNT: 15.1 % (ref 12.3–15.4)
ETHANOL UR QL: <0.01 %
GLOBULIN UR ELPH-MCNC: 3.4 GM/DL
GLUCOSE SERPL-MCNC: 85 MG/DL (ref 65–99)
HCT VFR BLD AUTO: 30.9 % (ref 37.5–51)
HGB BLD-MCNC: 10.5 G/DL (ref 13–17.7)
INR PPP: 1.35 (ref 0.93–1.1)
LYMPHOCYTES # BLD AUTO: 0.5 10*3/MM3 (ref 0.7–3.1)
LYMPHOCYTES NFR BLD AUTO: 13.4 % (ref 19.6–45.3)
MCH RBC QN AUTO: 31.6 PG (ref 26.6–33)
MCHC RBC AUTO-ENTMCNC: 34 G/DL (ref 31.5–35.7)
MCV RBC AUTO: 93 FL (ref 79–97)
MONOCYTES # BLD AUTO: 0.4 10*3/MM3 (ref 0.1–0.9)
MONOCYTES NFR BLD AUTO: 11.6 % (ref 5–12)
NEUTROPHILS NFR BLD AUTO: 2.4 10*3/MM3 (ref 1.7–7)
NEUTROPHILS NFR BLD AUTO: 70.4 % (ref 42.7–76)
NRBC BLD AUTO-RTO: 0.2 /100 WBC (ref 0–0.2)
PLATELET # BLD AUTO: 37 10*3/MM3 (ref 140–450)
PMV BLD AUTO: 7.3 FL (ref 6–12)
POTASSIUM SERPL-SCNC: 4.6 MMOL/L (ref 3.5–5.2)
PROT SERPL-MCNC: 6.9 G/DL (ref 6–8.5)
PROTHROMBIN TIME: 13.7 SECONDS (ref 9.6–11.7)
RBC # BLD AUTO: 3.32 10*6/MM3 (ref 4.14–5.8)
SODIUM SERPL-SCNC: 136 MMOL/L (ref 136–145)
WBC NRBC COR # BLD: 3.5 10*3/MM3 (ref 3.4–10.8)

## 2023-02-14 PROCEDURE — 80053 COMPREHEN METABOLIC PANEL: CPT

## 2023-02-14 PROCEDURE — 85610 PROTHROMBIN TIME: CPT

## 2023-02-14 PROCEDURE — 82077 ASSAY SPEC XCP UR&BREATH IA: CPT

## 2023-02-14 PROCEDURE — 85025 COMPLETE CBC W/AUTO DIFF WBC: CPT

## 2023-02-14 PROCEDURE — 36415 COLL VENOUS BLD VENIPUNCTURE: CPT

## 2023-02-21 ENCOUNTER — LAB (OUTPATIENT)
Dept: LAB | Facility: HOSPITAL | Age: 38
End: 2023-02-21
Payer: COMMERCIAL

## 2023-02-21 DIAGNOSIS — K70.2 ALCOHOLIC FIBROSIS AND SCLEROSIS OF LIVER: ICD-10-CM

## 2023-02-21 LAB
ALBUMIN SERPL-MCNC: 3.6 G/DL (ref 3.5–5.2)
ALBUMIN/GLOB SERPL: 0.9 G/DL
ALP SERPL-CCNC: 151 U/L (ref 39–117)
ALT SERPL W P-5'-P-CCNC: 37 U/L (ref 1–41)
ANION GAP SERPL CALCULATED.3IONS-SCNC: 9.2 MMOL/L (ref 5–15)
AST SERPL-CCNC: 46 U/L (ref 1–40)
BASOPHILS # BLD AUTO: 0 10*3/MM3 (ref 0–0.2)
BASOPHILS NFR BLD AUTO: 0.5 % (ref 0–1.5)
BILIRUB SERPL-MCNC: 1.5 MG/DL (ref 0–1.2)
BUN SERPL-MCNC: 14 MG/DL (ref 6–20)
BUN/CREAT SERPL: 7.6 (ref 7–25)
CALCIUM SPEC-SCNC: 8.5 MG/DL (ref 8.6–10.5)
CHLORIDE SERPL-SCNC: 105 MMOL/L (ref 98–107)
CO2 SERPL-SCNC: 20.8 MMOL/L (ref 22–29)
CREAT SERPL-MCNC: 1.85 MG/DL (ref 0.76–1.27)
DEPRECATED RDW RBC AUTO: 50.3 FL (ref 37–54)
EGFRCR SERPLBLD CKD-EPI 2021: 47.8 ML/MIN/1.73
EOSINOPHIL # BLD AUTO: 0.2 10*3/MM3 (ref 0–0.4)
EOSINOPHIL NFR BLD AUTO: 5.2 % (ref 0.3–6.2)
ERYTHROCYTE [DISTWIDTH] IN BLOOD BY AUTOMATED COUNT: 15.3 % (ref 12.3–15.4)
ETHANOL UR QL: <0.01 %
GLOBULIN UR ELPH-MCNC: 3.9 GM/DL
GLUCOSE SERPL-MCNC: 85 MG/DL (ref 65–99)
HCT VFR BLD AUTO: 32 % (ref 37.5–51)
HGB BLD-MCNC: 10.8 G/DL (ref 13–17.7)
INR PPP: 1.34 (ref 0.93–1.1)
LYMPHOCYTES # BLD AUTO: 0.5 10*3/MM3 (ref 0.7–3.1)
LYMPHOCYTES NFR BLD AUTO: 15.1 % (ref 19.6–45.3)
MCH RBC QN AUTO: 32 PG (ref 26.6–33)
MCHC RBC AUTO-ENTMCNC: 33.7 G/DL (ref 31.5–35.7)
MCV RBC AUTO: 94.8 FL (ref 79–97)
MONOCYTES # BLD AUTO: 0.4 10*3/MM3 (ref 0.1–0.9)
MONOCYTES NFR BLD AUTO: 10.9 % (ref 5–12)
NEUTROPHILS NFR BLD AUTO: 2.3 10*3/MM3 (ref 1.7–7)
NEUTROPHILS NFR BLD AUTO: 68.3 % (ref 42.7–76)
NRBC BLD AUTO-RTO: 0 /100 WBC (ref 0–0.2)
PLATELET # BLD AUTO: 37 10*3/MM3 (ref 140–450)
PMV BLD AUTO: 8.1 FL (ref 6–12)
POTASSIUM SERPL-SCNC: 4.7 MMOL/L (ref 3.5–5.2)
PROT SERPL-MCNC: 7.5 G/DL (ref 6–8.5)
PROTHROMBIN TIME: 13.6 SECONDS (ref 9.6–11.7)
RBC # BLD AUTO: 3.38 10*6/MM3 (ref 4.14–5.8)
SODIUM SERPL-SCNC: 135 MMOL/L (ref 136–145)
WBC NRBC COR # BLD: 3.3 10*3/MM3 (ref 3.4–10.8)

## 2023-02-21 PROCEDURE — 36415 COLL VENOUS BLD VENIPUNCTURE: CPT

## 2023-02-21 PROCEDURE — 82077 ASSAY SPEC XCP UR&BREATH IA: CPT

## 2023-02-21 PROCEDURE — 85610 PROTHROMBIN TIME: CPT

## 2023-02-21 PROCEDURE — 80053 COMPREHEN METABOLIC PANEL: CPT

## 2023-02-21 PROCEDURE — 85025 COMPLETE CBC W/AUTO DIFF WBC: CPT

## 2023-02-28 ENCOUNTER — LAB (OUTPATIENT)
Dept: LAB | Facility: HOSPITAL | Age: 38
End: 2023-02-28
Payer: COMMERCIAL

## 2023-02-28 DIAGNOSIS — K70.2 ALCOHOLIC FIBROSIS AND SCLEROSIS OF LIVER: ICD-10-CM

## 2023-02-28 LAB
ALBUMIN SERPL-MCNC: 3.7 G/DL (ref 3.5–5.2)
ALBUMIN/GLOB SERPL: 1 G/DL
ALP SERPL-CCNC: 144 U/L (ref 39–117)
ALT SERPL W P-5'-P-CCNC: 32 U/L (ref 1–41)
ANION GAP SERPL CALCULATED.3IONS-SCNC: 10.6 MMOL/L (ref 5–15)
AST SERPL-CCNC: 34 U/L (ref 1–40)
BASOPHILS # BLD AUTO: 0 10*3/MM3 (ref 0–0.2)
BASOPHILS NFR BLD AUTO: 0.4 % (ref 0–1.5)
BILIRUB SERPL-MCNC: 1.6 MG/DL (ref 0–1.2)
BUN SERPL-MCNC: 30 MG/DL (ref 6–20)
BUN/CREAT SERPL: 16.8 (ref 7–25)
CALCIUM SPEC-SCNC: 8.7 MG/DL (ref 8.6–10.5)
CHLORIDE SERPL-SCNC: 104 MMOL/L (ref 98–107)
CO2 SERPL-SCNC: 21.4 MMOL/L (ref 22–29)
CREAT SERPL-MCNC: 1.79 MG/DL (ref 0.76–1.27)
DEPRECATED RDW RBC AUTO: 48.6 FL (ref 37–54)
EGFRCR SERPLBLD CKD-EPI 2021: 49.7 ML/MIN/1.73
EOSINOPHIL # BLD AUTO: 0.1 10*3/MM3 (ref 0–0.4)
EOSINOPHIL NFR BLD AUTO: 4.1 % (ref 0.3–6.2)
ERYTHROCYTE [DISTWIDTH] IN BLOOD BY AUTOMATED COUNT: 14.9 % (ref 12.3–15.4)
ETHANOL UR QL: <0.01 %
GLOBULIN UR ELPH-MCNC: 3.7 GM/DL
GLUCOSE SERPL-MCNC: 79 MG/DL (ref 65–99)
HCT VFR BLD AUTO: 33.5 % (ref 37.5–51)
HGB BLD-MCNC: 11.2 G/DL (ref 13–17.7)
INR PPP: 1.33 (ref 0.93–1.1)
LYMPHOCYTES # BLD AUTO: 0.5 10*3/MM3 (ref 0.7–3.1)
LYMPHOCYTES NFR BLD AUTO: 14.1 % (ref 19.6–45.3)
MCH RBC QN AUTO: 31.4 PG (ref 26.6–33)
MCHC RBC AUTO-ENTMCNC: 33.3 G/DL (ref 31.5–35.7)
MCV RBC AUTO: 94 FL (ref 79–97)
MONOCYTES # BLD AUTO: 0.4 10*3/MM3 (ref 0.1–0.9)
MONOCYTES NFR BLD AUTO: 10.5 % (ref 5–12)
NEUTROPHILS NFR BLD AUTO: 2.4 10*3/MM3 (ref 1.7–7)
NEUTROPHILS NFR BLD AUTO: 70.9 % (ref 42.7–76)
NRBC BLD AUTO-RTO: 0.1 /100 WBC (ref 0–0.2)
PLATELET # BLD AUTO: 35 10*3/MM3 (ref 140–450)
PMV BLD AUTO: 7.9 FL (ref 6–12)
POTASSIUM SERPL-SCNC: 4.8 MMOL/L (ref 3.5–5.2)
PROT SERPL-MCNC: 7.4 G/DL (ref 6–8.5)
PROTHROMBIN TIME: 13.5 SECONDS (ref 9.6–11.7)
RBC # BLD AUTO: 3.56 10*6/MM3 (ref 4.14–5.8)
SODIUM SERPL-SCNC: 136 MMOL/L (ref 136–145)
WBC NRBC COR # BLD: 3.4 10*3/MM3 (ref 3.4–10.8)

## 2023-02-28 PROCEDURE — 80053 COMPREHEN METABOLIC PANEL: CPT

## 2023-02-28 PROCEDURE — 36415 COLL VENOUS BLD VENIPUNCTURE: CPT

## 2023-02-28 PROCEDURE — 85610 PROTHROMBIN TIME: CPT

## 2023-02-28 PROCEDURE — 85025 COMPLETE CBC W/AUTO DIFF WBC: CPT

## 2023-02-28 PROCEDURE — 82077 ASSAY SPEC XCP UR&BREATH IA: CPT

## 2023-03-07 ENCOUNTER — LAB (OUTPATIENT)
Dept: LAB | Facility: HOSPITAL | Age: 38
End: 2023-03-07
Payer: COMMERCIAL

## 2023-03-07 DIAGNOSIS — K70.2 ALCOHOLIC FIBROSIS AND SCLEROSIS OF LIVER: ICD-10-CM

## 2023-03-07 LAB
ALBUMIN SERPL-MCNC: 3.6 G/DL (ref 3.5–5.2)
ALBUMIN/GLOB SERPL: 1 G/DL
ALP SERPL-CCNC: 174 U/L (ref 39–117)
ALT SERPL W P-5'-P-CCNC: 34 U/L (ref 1–41)
ANION GAP SERPL CALCULATED.3IONS-SCNC: 10.3 MMOL/L (ref 5–15)
AST SERPL-CCNC: 38 U/L (ref 1–40)
BASOPHILS # BLD AUTO: 0 10*3/MM3 (ref 0–0.2)
BASOPHILS NFR BLD AUTO: 0.4 % (ref 0–1.5)
BILIRUB SERPL-MCNC: 1.7 MG/DL (ref 0–1.2)
BUN SERPL-MCNC: 24 MG/DL (ref 6–20)
BUN/CREAT SERPL: 13.8 (ref 7–25)
CALCIUM SPEC-SCNC: 8.6 MG/DL (ref 8.6–10.5)
CHLORIDE SERPL-SCNC: 103 MMOL/L (ref 98–107)
CO2 SERPL-SCNC: 22.7 MMOL/L (ref 22–29)
CREAT SERPL-MCNC: 1.74 MG/DL (ref 0.76–1.27)
DEPRECATED RDW RBC AUTO: 49.4 FL (ref 37–54)
EGFRCR SERPLBLD CKD-EPI 2021: 51.5 ML/MIN/1.73
EOSINOPHIL # BLD AUTO: 0.2 10*3/MM3 (ref 0–0.4)
EOSINOPHIL NFR BLD AUTO: 5 % (ref 0.3–6.2)
ERYTHROCYTE [DISTWIDTH] IN BLOOD BY AUTOMATED COUNT: 14.7 % (ref 12.3–15.4)
ETHANOL UR QL: <0.01 %
GLOBULIN UR ELPH-MCNC: 3.6 GM/DL
GLUCOSE SERPL-MCNC: 99 MG/DL (ref 65–99)
HCT VFR BLD AUTO: 29.9 % (ref 37.5–51)
HGB BLD-MCNC: 10.3 G/DL (ref 13–17.7)
INR PPP: 1.32 (ref 0.93–1.1)
LYMPHOCYTES # BLD AUTO: 0.5 10*3/MM3 (ref 0.7–3.1)
LYMPHOCYTES NFR BLD AUTO: 15.3 % (ref 19.6–45.3)
MCH RBC QN AUTO: 31.7 PG (ref 26.6–33)
MCHC RBC AUTO-ENTMCNC: 34.5 G/DL (ref 31.5–35.7)
MCV RBC AUTO: 91.7 FL (ref 79–97)
MONOCYTES # BLD AUTO: 0.4 10*3/MM3 (ref 0.1–0.9)
MONOCYTES NFR BLD AUTO: 12.8 % (ref 5–12)
NEUTROPHILS NFR BLD AUTO: 2.3 10*3/MM3 (ref 1.7–7)
NEUTROPHILS NFR BLD AUTO: 66.5 % (ref 42.7–76)
NRBC BLD AUTO-RTO: 0.1 /100 WBC (ref 0–0.2)
PLATELET # BLD AUTO: 34 10*3/MM3 (ref 140–450)
PMV BLD AUTO: 7.7 FL (ref 6–12)
POTASSIUM SERPL-SCNC: 4.4 MMOL/L (ref 3.5–5.2)
PROT SERPL-MCNC: 7.2 G/DL (ref 6–8.5)
PROTHROMBIN TIME: 13.4 SECONDS (ref 9.6–11.7)
RBC # BLD AUTO: 3.26 10*6/MM3 (ref 4.14–5.8)
RBC MORPH BLD: NORMAL
SMALL PLATELETS BLD QL SMEAR: NORMAL
SODIUM SERPL-SCNC: 136 MMOL/L (ref 136–145)
WBC MORPH BLD: NORMAL
WBC NRBC COR # BLD: 3.5 10*3/MM3 (ref 3.4–10.8)

## 2023-03-07 PROCEDURE — 85610 PROTHROMBIN TIME: CPT

## 2023-03-07 PROCEDURE — 80053 COMPREHEN METABOLIC PANEL: CPT

## 2023-03-07 PROCEDURE — 85025 COMPLETE CBC W/AUTO DIFF WBC: CPT

## 2023-03-07 PROCEDURE — 36415 COLL VENOUS BLD VENIPUNCTURE: CPT

## 2023-03-07 PROCEDURE — 85007 BL SMEAR W/DIFF WBC COUNT: CPT

## 2023-03-07 PROCEDURE — 82077 ASSAY SPEC XCP UR&BREATH IA: CPT

## 2023-03-13 ENCOUNTER — LAB (OUTPATIENT)
Dept: LAB | Facility: HOSPITAL | Age: 38
End: 2023-03-13
Payer: COMMERCIAL

## 2023-03-13 DIAGNOSIS — K70.2 ALCOHOLIC FIBROSIS AND SCLEROSIS OF LIVER: ICD-10-CM

## 2023-03-13 LAB
ALBUMIN SERPL-MCNC: 3.7 G/DL (ref 3.5–5.2)
ALBUMIN/GLOB SERPL: 1 G/DL
ALP SERPL-CCNC: 172 U/L (ref 39–117)
ALT SERPL W P-5'-P-CCNC: 31 U/L (ref 1–41)
ANION GAP SERPL CALCULATED.3IONS-SCNC: 7 MMOL/L (ref 5–15)
AST SERPL-CCNC: 38 U/L (ref 1–40)
BASOPHILS # BLD AUTO: 0 10*3/MM3 (ref 0–0.2)
BASOPHILS NFR BLD AUTO: 0.4 % (ref 0–1.5)
BILIRUB SERPL-MCNC: 1.7 MG/DL (ref 0–1.2)
BUN SERPL-MCNC: 25 MG/DL (ref 6–20)
BUN/CREAT SERPL: 15.2 (ref 7–25)
CALCIUM SPEC-SCNC: 8.5 MG/DL (ref 8.6–10.5)
CHLORIDE SERPL-SCNC: 106 MMOL/L (ref 98–107)
CO2 SERPL-SCNC: 23 MMOL/L (ref 22–29)
CREAT SERPL-MCNC: 1.64 MG/DL (ref 0.76–1.27)
DEPRECATED RDW RBC AUTO: 51.6 FL (ref 37–54)
EGFRCR SERPLBLD CKD-EPI 2021: 55.2 ML/MIN/1.73
EOSINOPHIL # BLD AUTO: 0.2 10*3/MM3 (ref 0–0.4)
EOSINOPHIL NFR BLD AUTO: 4.8 % (ref 0.3–6.2)
ERYTHROCYTE [DISTWIDTH] IN BLOOD BY AUTOMATED COUNT: 15.2 % (ref 12.3–15.4)
ETHANOL UR QL: <0.01 %
GLOBULIN UR ELPH-MCNC: 3.6 GM/DL
GLUCOSE SERPL-MCNC: 100 MG/DL (ref 65–99)
HCT VFR BLD AUTO: 32 % (ref 37.5–51)
HGB BLD-MCNC: 10.9 G/DL (ref 13–17.7)
INR PPP: 1.33 (ref 0.93–1.1)
LYMPHOCYTES # BLD AUTO: 0.5 10*3/MM3 (ref 0.7–3.1)
LYMPHOCYTES NFR BLD AUTO: 11.8 % (ref 19.6–45.3)
MCH RBC QN AUTO: 31.5 PG (ref 26.6–33)
MCHC RBC AUTO-ENTMCNC: 33.9 G/DL (ref 31.5–35.7)
MCV RBC AUTO: 92.8 FL (ref 79–97)
MONOCYTES # BLD AUTO: 0.4 10*3/MM3 (ref 0.1–0.9)
MONOCYTES NFR BLD AUTO: 11 % (ref 5–12)
NEUTROPHILS NFR BLD AUTO: 2.9 10*3/MM3 (ref 1.7–7)
NEUTROPHILS NFR BLD AUTO: 72 % (ref 42.7–76)
NRBC BLD AUTO-RTO: 0.1 /100 WBC (ref 0–0.2)
PLATELET # BLD AUTO: 37 10*3/MM3 (ref 140–450)
PMV BLD AUTO: 7.5 FL (ref 6–12)
POTASSIUM SERPL-SCNC: 4.5 MMOL/L (ref 3.5–5.2)
PROT SERPL-MCNC: 7.3 G/DL (ref 6–8.5)
PROTHROMBIN TIME: 13.5 SECONDS (ref 9.6–11.7)
RBC # BLD AUTO: 3.45 10*6/MM3 (ref 4.14–5.8)
SODIUM SERPL-SCNC: 136 MMOL/L (ref 136–145)
WBC NRBC COR # BLD: 4 10*3/MM3 (ref 3.4–10.8)

## 2023-03-13 PROCEDURE — 85610 PROTHROMBIN TIME: CPT

## 2023-03-13 PROCEDURE — 85025 COMPLETE CBC W/AUTO DIFF WBC: CPT

## 2023-03-13 PROCEDURE — 80053 COMPREHEN METABOLIC PANEL: CPT

## 2023-03-13 PROCEDURE — 36415 COLL VENOUS BLD VENIPUNCTURE: CPT

## 2023-03-13 PROCEDURE — 82077 ASSAY SPEC XCP UR&BREATH IA: CPT

## 2023-03-21 ENCOUNTER — LAB (OUTPATIENT)
Dept: LAB | Facility: HOSPITAL | Age: 38
End: 2023-03-21
Payer: COMMERCIAL

## 2023-03-21 DIAGNOSIS — K70.2 ALCOHOLIC FIBROSIS AND SCLEROSIS OF LIVER: ICD-10-CM

## 2023-03-21 LAB
ALBUMIN SERPL-MCNC: 3.6 G/DL (ref 3.5–5.2)
ALBUMIN/GLOB SERPL: 1 G/DL
ALP SERPL-CCNC: 140 U/L (ref 39–117)
ALT SERPL W P-5'-P-CCNC: 29 U/L (ref 1–41)
ANION GAP SERPL CALCULATED.3IONS-SCNC: 9 MMOL/L (ref 5–15)
AST SERPL-CCNC: 35 U/L (ref 1–40)
BASOPHILS # BLD AUTO: 0 10*3/MM3 (ref 0–0.2)
BASOPHILS NFR BLD AUTO: 0.4 % (ref 0–1.5)
BILIRUB SERPL-MCNC: 1.5 MG/DL (ref 0–1.2)
BUN SERPL-MCNC: 29 MG/DL (ref 6–20)
BUN/CREAT SERPL: 18.5 (ref 7–25)
CALCIUM SPEC-SCNC: 8.7 MG/DL (ref 8.6–10.5)
CHLORIDE SERPL-SCNC: 105 MMOL/L (ref 98–107)
CO2 SERPL-SCNC: 21 MMOL/L (ref 22–29)
CREAT SERPL-MCNC: 1.57 MG/DL (ref 0.76–1.27)
DEPRECATED RDW RBC AUTO: 48.6 FL (ref 37–54)
EGFRCR SERPLBLD CKD-EPI 2021: 58.2 ML/MIN/1.73
EOSINOPHIL # BLD AUTO: 0.2 10*3/MM3 (ref 0–0.4)
EOSINOPHIL NFR BLD AUTO: 5.1 % (ref 0.3–6.2)
ERYTHROCYTE [DISTWIDTH] IN BLOOD BY AUTOMATED COUNT: 15.1 % (ref 12.3–15.4)
ETHANOL UR QL: <0.01 %
GLOBULIN UR ELPH-MCNC: 3.5 GM/DL
GLUCOSE SERPL-MCNC: 110 MG/DL (ref 65–99)
HCT VFR BLD AUTO: 31 % (ref 37.5–51)
HGB BLD-MCNC: 10.3 G/DL (ref 13–17.7)
INR PPP: 1.39 (ref 0.93–1.1)
LYMPHOCYTES # BLD AUTO: 0.5 10*3/MM3 (ref 0.7–3.1)
LYMPHOCYTES NFR BLD AUTO: 16.5 % (ref 19.6–45.3)
MCH RBC QN AUTO: 31.3 PG (ref 26.6–33)
MCHC RBC AUTO-ENTMCNC: 33.3 G/DL (ref 31.5–35.7)
MCV RBC AUTO: 94.2 FL (ref 79–97)
MONOCYTES # BLD AUTO: 0.3 10*3/MM3 (ref 0.1–0.9)
MONOCYTES NFR BLD AUTO: 10.1 % (ref 5–12)
NEUTROPHILS NFR BLD AUTO: 2.1 10*3/MM3 (ref 1.7–7)
NEUTROPHILS NFR BLD AUTO: 67.9 % (ref 42.7–76)
NRBC BLD AUTO-RTO: 0 /100 WBC (ref 0–0.2)
PLATELET # BLD AUTO: 35 10*3/MM3 (ref 140–450)
PMV BLD AUTO: 7 FL (ref 6–12)
POTASSIUM SERPL-SCNC: 4.6 MMOL/L (ref 3.5–5.2)
PROT SERPL-MCNC: 7.1 G/DL (ref 6–8.5)
PROTHROMBIN TIME: 14.1 SECONDS (ref 9.6–11.7)
RBC # BLD AUTO: 3.29 10*6/MM3 (ref 4.14–5.8)
SODIUM SERPL-SCNC: 135 MMOL/L (ref 136–145)
WBC NRBC COR # BLD: 3.1 10*3/MM3 (ref 3.4–10.8)

## 2023-03-21 PROCEDURE — 36415 COLL VENOUS BLD VENIPUNCTURE: CPT

## 2023-03-21 PROCEDURE — 80053 COMPREHEN METABOLIC PANEL: CPT

## 2023-03-21 PROCEDURE — 85025 COMPLETE CBC W/AUTO DIFF WBC: CPT

## 2023-03-21 PROCEDURE — 85610 PROTHROMBIN TIME: CPT

## 2023-03-21 PROCEDURE — 82077 ASSAY SPEC XCP UR&BREATH IA: CPT

## 2023-03-24 ENCOUNTER — OFFICE (AMBULATORY)
Dept: URBAN - METROPOLITAN AREA CLINIC 64 | Facility: CLINIC | Age: 38
End: 2023-03-24

## 2023-03-24 VITALS
HEIGHT: 74 IN | DIASTOLIC BLOOD PRESSURE: 70 MMHG | SYSTOLIC BLOOD PRESSURE: 99 MMHG | HEART RATE: 96 BPM | WEIGHT: 186 LBS

## 2023-03-24 DIAGNOSIS — D69.6 THROMBOCYTOPENIA, UNSPECIFIED: ICD-10-CM

## 2023-03-24 DIAGNOSIS — K72.90 HEPATIC FAILURE, UNSPECIFIED WITHOUT COMA: ICD-10-CM

## 2023-03-24 DIAGNOSIS — K70.31 ALCOHOLIC CIRRHOSIS OF LIVER WITH ASCITES: ICD-10-CM

## 2023-03-24 DIAGNOSIS — I85.00 ESOPHAGEAL VARICES WITHOUT BLEEDING: ICD-10-CM

## 2023-03-24 PROCEDURE — 99214 OFFICE O/P EST MOD 30 MIN: CPT | Performed by: INTERNAL MEDICINE

## 2023-03-24 NOTE — SERVICEHPINOTES
padmini
br Patient is a pleasant 37-year-old male with history of EtOH abuse and cirrhosis complicated by ascites an nonbleeding esophageal varices who presents today for follow-up.He reports he has been doing well since last visit. He denies any alcohol use for about 1 year now.  Denies abdominal pain or abdominal distention.  He follows a low sodium diet at home. He is prescribed Lasix 20 mg daily, but states he alternates taking 20 mg one day and 10 mg the next day. He is also taking spironolactone 50 mg daily. No BLE edema. No nausea/vomiting, heartburn, or dysphagia.  He states that he has been moving his bowels regularly 2-3 times daily.  Denies any bright red blood per rectum or melena.  Denies any slowed thinking or confusion recently. He is taking lactulose 30 mL daily and knows to titrate to a goal of 2-3 bms a day. Milo last paracentesis was during his hospitalization at [facility] in 2/2022. He is currently following with [facility] transplant team. padmini [place]
br
SLV-- doing well, minimal fluid. No paracentesis for 6 months. Last para they told him not enough to drain. Mild abdominal swelling which is stable. Denies confusion or day/night disturbance. Denies blood in stool. Has 2-3 BM per day with lactulose. [facility]. Has bloating/ gas after meals, improves after having a BM. Rare nausea, no vomiting.  Weight is stable. padmini [place] br2/2022 MRCP - cirrhosis with stigmata of portal hypertension, large volume of ascitesbr9/2021 RUQ US - cirrhosis, no focal liver lesion, large volume of ascitesbr6/2021 RUQ US - cirrhosis, large amount of ascitesbr6/2021 CT abdomen/pelvis WO - cirrhosis with splenomegaly and a large volume of ascites with marked anasarca1/2022 EGD (Dr. Regan) - 3 cords of grade 3 esophageal varices status post banding ×2, portal hypertensive gastropathy span id="{[id number]}" class="narrative freetextSelected" type="freetext" canedit="true" suppressed="false" nid="[id number]" gid="{[id number]}" bound="false" visited="true"br/span

## 2023-03-28 ENCOUNTER — LAB (OUTPATIENT)
Dept: LAB | Facility: HOSPITAL | Age: 38
End: 2023-03-28
Payer: COMMERCIAL

## 2023-03-28 DIAGNOSIS — K70.2 ALCOHOLIC FIBROSIS AND SCLEROSIS OF LIVER: ICD-10-CM

## 2023-03-28 LAB
ALBUMIN SERPL-MCNC: 3.6 G/DL (ref 3.5–5.2)
ALBUMIN/GLOB SERPL: 0.9 G/DL
ALP SERPL-CCNC: 147 U/L (ref 39–117)
ALT SERPL W P-5'-P-CCNC: 33 U/L (ref 1–41)
ANION GAP SERPL CALCULATED.3IONS-SCNC: 8.8 MMOL/L (ref 5–15)
AST SERPL-CCNC: 37 U/L (ref 1–40)
BASOPHILS # BLD AUTO: 0 10*3/MM3 (ref 0–0.2)
BASOPHILS NFR BLD AUTO: 0.4 % (ref 0–1.5)
BILIRUB SERPL-MCNC: 1.5 MG/DL (ref 0–1.2)
BUN SERPL-MCNC: 25 MG/DL (ref 6–20)
BUN/CREAT SERPL: 13.1 (ref 7–25)
CALCIUM SPEC-SCNC: 9 MG/DL (ref 8.6–10.5)
CHLORIDE SERPL-SCNC: 102 MMOL/L (ref 98–107)
CO2 SERPL-SCNC: 23.2 MMOL/L (ref 22–29)
CREAT SERPL-MCNC: 1.91 MG/DL (ref 0.76–1.27)
DEPRECATED RDW RBC AUTO: 50.8 FL (ref 37–54)
EGFRCR SERPLBLD CKD-EPI 2021: 46 ML/MIN/1.73
EOSINOPHIL # BLD AUTO: 0.1 10*3/MM3 (ref 0–0.4)
EOSINOPHIL NFR BLD AUTO: 4.9 % (ref 0.3–6.2)
ERYTHROCYTE [DISTWIDTH] IN BLOOD BY AUTOMATED COUNT: 15.1 % (ref 12.3–15.4)
ETHANOL UR QL: <0.01 %
GLOBULIN UR ELPH-MCNC: 3.8 GM/DL
GLUCOSE SERPL-MCNC: 83 MG/DL (ref 65–99)
HCT VFR BLD AUTO: 30.4 % (ref 37.5–51)
HGB BLD-MCNC: 10.7 G/DL (ref 13–17.7)
INR PPP: 1.38 (ref 0.93–1.1)
LYMPHOCYTES # BLD AUTO: 0.6 10*3/MM3 (ref 0.7–3.1)
LYMPHOCYTES NFR BLD AUTO: 18.9 % (ref 19.6–45.3)
MCH RBC QN AUTO: 32 PG (ref 26.6–33)
MCHC RBC AUTO-ENTMCNC: 35 G/DL (ref 31.5–35.7)
MCV RBC AUTO: 91.3 FL (ref 79–97)
MONOCYTES # BLD AUTO: 0.4 10*3/MM3 (ref 0.1–0.9)
MONOCYTES NFR BLD AUTO: 13 % (ref 5–12)
NEUTROPHILS NFR BLD AUTO: 1.8 10*3/MM3 (ref 1.7–7)
NEUTROPHILS NFR BLD AUTO: 62.8 % (ref 42.7–76)
NRBC BLD AUTO-RTO: 0.2 /100 WBC (ref 0–0.2)
PLATELET # BLD AUTO: 35 10*3/MM3 (ref 140–450)
PMV BLD AUTO: 7.2 FL (ref 6–12)
POTASSIUM SERPL-SCNC: 4.5 MMOL/L (ref 3.5–5.2)
PROT SERPL-MCNC: 7.4 G/DL (ref 6–8.5)
PROTHROMBIN TIME: 14 SECONDS (ref 9.6–11.7)
RBC # BLD AUTO: 3.33 10*6/MM3 (ref 4.14–5.8)
SODIUM SERPL-SCNC: 134 MMOL/L (ref 136–145)
WBC NRBC COR # BLD: 2.9 10*3/MM3 (ref 3.4–10.8)

## 2023-03-28 PROCEDURE — 85610 PROTHROMBIN TIME: CPT

## 2023-03-28 PROCEDURE — 85025 COMPLETE CBC W/AUTO DIFF WBC: CPT

## 2023-03-28 PROCEDURE — 80053 COMPREHEN METABOLIC PANEL: CPT

## 2023-03-28 PROCEDURE — 36415 COLL VENOUS BLD VENIPUNCTURE: CPT

## 2023-03-28 PROCEDURE — 82077 ASSAY SPEC XCP UR&BREATH IA: CPT

## 2023-04-04 ENCOUNTER — LAB (OUTPATIENT)
Dept: LAB | Facility: HOSPITAL | Age: 38
End: 2023-04-04
Payer: COMMERCIAL

## 2023-04-04 DIAGNOSIS — K70.2 ALCOHOLIC FIBROSIS AND SCLEROSIS OF LIVER: ICD-10-CM

## 2023-04-04 LAB
ALBUMIN SERPL-MCNC: 3.6 G/DL (ref 3.5–5.2)
ALBUMIN/GLOB SERPL: 1.1 G/DL
ALP SERPL-CCNC: 154 U/L (ref 39–117)
ALT SERPL W P-5'-P-CCNC: 34 U/L (ref 1–41)
ANION GAP SERPL CALCULATED.3IONS-SCNC: 7.7 MMOL/L (ref 5–15)
AST SERPL-CCNC: 33 U/L (ref 1–40)
BASOPHILS # BLD AUTO: 0 10*3/MM3 (ref 0–0.2)
BASOPHILS NFR BLD AUTO: 0.4 % (ref 0–1.5)
BILIRUB SERPL-MCNC: 1.6 MG/DL (ref 0–1.2)
BUN SERPL-MCNC: 26 MG/DL (ref 6–20)
BUN/CREAT SERPL: 15.8 (ref 7–25)
CALCIUM SPEC-SCNC: 8.8 MG/DL (ref 8.6–10.5)
CHLORIDE SERPL-SCNC: 106 MMOL/L (ref 98–107)
CO2 SERPL-SCNC: 23.3 MMOL/L (ref 22–29)
CREAT SERPL-MCNC: 1.65 MG/DL (ref 0.76–1.27)
DEPRECATED RDW RBC AUTO: 49.4 FL (ref 37–54)
EGFRCR SERPLBLD CKD-EPI 2021: 54.8 ML/MIN/1.73
EOSINOPHIL # BLD AUTO: 0.2 10*3/MM3 (ref 0–0.4)
EOSINOPHIL NFR BLD AUTO: 5.3 % (ref 0.3–6.2)
ERYTHROCYTE [DISTWIDTH] IN BLOOD BY AUTOMATED COUNT: 15.3 % (ref 12.3–15.4)
ETHANOL UR QL: <0.01 %
GLOBULIN UR ELPH-MCNC: 3.4 GM/DL
GLUCOSE SERPL-MCNC: 96 MG/DL (ref 65–99)
HCT VFR BLD AUTO: 31 % (ref 37.5–51)
HGB BLD-MCNC: 10.5 G/DL (ref 13–17.7)
INR PPP: 1.38 (ref 0.93–1.1)
LYMPHOCYTES # BLD AUTO: 0.5 10*3/MM3 (ref 0.7–3.1)
LYMPHOCYTES NFR BLD AUTO: 16.1 % (ref 19.6–45.3)
MCH RBC QN AUTO: 31.6 PG (ref 26.6–33)
MCHC RBC AUTO-ENTMCNC: 33.7 G/DL (ref 31.5–35.7)
MCV RBC AUTO: 93.9 FL (ref 79–97)
MONOCYTES # BLD AUTO: 0.3 10*3/MM3 (ref 0.1–0.9)
MONOCYTES NFR BLD AUTO: 11.5 % (ref 5–12)
NEUTROPHILS NFR BLD AUTO: 2 10*3/MM3 (ref 1.7–7)
NEUTROPHILS NFR BLD AUTO: 66.7 % (ref 42.7–76)
NRBC BLD AUTO-RTO: 0 /100 WBC (ref 0–0.2)
PLATELET # BLD AUTO: 37 10*3/MM3 (ref 140–450)
PMV BLD AUTO: 7.4 FL (ref 6–12)
POTASSIUM SERPL-SCNC: 4.1 MMOL/L (ref 3.5–5.2)
PROT SERPL-MCNC: 7 G/DL (ref 6–8.5)
PROTHROMBIN TIME: 14 SECONDS (ref 9.6–11.7)
RBC # BLD AUTO: 3.3 10*6/MM3 (ref 4.14–5.8)
SODIUM SERPL-SCNC: 137 MMOL/L (ref 136–145)
WBC NRBC COR # BLD: 3 10*3/MM3 (ref 3.4–10.8)

## 2023-04-04 PROCEDURE — 36415 COLL VENOUS BLD VENIPUNCTURE: CPT

## 2023-04-04 PROCEDURE — 82077 ASSAY SPEC XCP UR&BREATH IA: CPT

## 2023-04-04 PROCEDURE — 85025 COMPLETE CBC W/AUTO DIFF WBC: CPT

## 2023-04-04 PROCEDURE — 85610 PROTHROMBIN TIME: CPT

## 2023-04-04 PROCEDURE — 80053 COMPREHEN METABOLIC PANEL: CPT

## 2023-04-10 ENCOUNTER — LAB (OUTPATIENT)
Dept: LAB | Facility: HOSPITAL | Age: 38
End: 2023-04-10
Payer: COMMERCIAL

## 2023-04-10 DIAGNOSIS — K70.2 ALCOHOLIC FIBROSIS AND SCLEROSIS OF LIVER: ICD-10-CM

## 2023-04-10 LAB
ALBUMIN SERPL-MCNC: 3.9 G/DL (ref 3.5–5.2)
ALBUMIN/GLOB SERPL: 1.3 G/DL
ALP SERPL-CCNC: 161 U/L (ref 39–117)
ALT SERPL W P-5'-P-CCNC: 30 U/L (ref 1–41)
ANION GAP SERPL CALCULATED.3IONS-SCNC: 9.2 MMOL/L (ref 5–15)
AST SERPL-CCNC: 36 U/L (ref 1–40)
BASOPHILS # BLD AUTO: 0 10*3/MM3 (ref 0–0.2)
BASOPHILS NFR BLD AUTO: 0.4 % (ref 0–1.5)
BILIRUB SERPL-MCNC: 1.6 MG/DL (ref 0–1.2)
BUN SERPL-MCNC: 26 MG/DL (ref 6–20)
BUN/CREAT SERPL: 16.1 (ref 7–25)
CALCIUM SPEC-SCNC: 8.9 MG/DL (ref 8.6–10.5)
CHLORIDE SERPL-SCNC: 105 MMOL/L (ref 98–107)
CO2 SERPL-SCNC: 20.8 MMOL/L (ref 22–29)
CREAT SERPL-MCNC: 1.61 MG/DL (ref 0.76–1.27)
DEPRECATED RDW RBC AUTO: 48.6 FL (ref 37–54)
EGFRCR SERPLBLD CKD-EPI 2021: 56.5 ML/MIN/1.73
EOSINOPHIL # BLD AUTO: 0.2 10*3/MM3 (ref 0–0.4)
EOSINOPHIL NFR BLD AUTO: 5.1 % (ref 0.3–6.2)
ERYTHROCYTE [DISTWIDTH] IN BLOOD BY AUTOMATED COUNT: 15 % (ref 12.3–15.4)
ETHANOL UR QL: <0.01 %
GLOBULIN UR ELPH-MCNC: 3.1 GM/DL
GLUCOSE SERPL-MCNC: 88 MG/DL (ref 65–99)
HCT VFR BLD AUTO: 33 % (ref 37.5–51)
HGB BLD-MCNC: 10.9 G/DL (ref 13–17.7)
INR PPP: 1.37 (ref 0.93–1.1)
LYMPHOCYTES # BLD AUTO: 0.5 10*3/MM3 (ref 0.7–3.1)
LYMPHOCYTES NFR BLD AUTO: 16 % (ref 19.6–45.3)
MCH RBC QN AUTO: 31.1 PG (ref 26.6–33)
MCHC RBC AUTO-ENTMCNC: 33.1 G/DL (ref 31.5–35.7)
MCV RBC AUTO: 93.9 FL (ref 79–97)
MONOCYTES # BLD AUTO: 0.4 10*3/MM3 (ref 0.1–0.9)
MONOCYTES NFR BLD AUTO: 11.3 % (ref 5–12)
NEUTROPHILS NFR BLD AUTO: 2.1 10*3/MM3 (ref 1.7–7)
NEUTROPHILS NFR BLD AUTO: 67.2 % (ref 42.7–76)
NRBC BLD AUTO-RTO: 0.1 /100 WBC (ref 0–0.2)
PLATELET # BLD AUTO: 38 10*3/MM3 (ref 140–450)
PMV BLD AUTO: 7.4 FL (ref 6–12)
POTASSIUM SERPL-SCNC: 4.8 MMOL/L (ref 3.5–5.2)
PROT SERPL-MCNC: 7 G/DL (ref 6–8.5)
PROTHROMBIN TIME: 13.9 SECONDS (ref 9.6–11.7)
RBC # BLD AUTO: 3.52 10*6/MM3 (ref 4.14–5.8)
SODIUM SERPL-SCNC: 135 MMOL/L (ref 136–145)
WBC NRBC COR # BLD: 3.2 10*3/MM3 (ref 3.4–10.8)

## 2023-04-10 PROCEDURE — 85025 COMPLETE CBC W/AUTO DIFF WBC: CPT

## 2023-04-10 PROCEDURE — 80053 COMPREHEN METABOLIC PANEL: CPT

## 2023-04-10 PROCEDURE — 85610 PROTHROMBIN TIME: CPT

## 2023-04-10 PROCEDURE — 36415 COLL VENOUS BLD VENIPUNCTURE: CPT

## 2023-04-10 PROCEDURE — 82077 ASSAY SPEC XCP UR&BREATH IA: CPT

## 2023-04-26 ENCOUNTER — LAB (OUTPATIENT)
Dept: LAB | Facility: HOSPITAL | Age: 38
End: 2023-04-26
Payer: COMMERCIAL

## 2023-04-26 DIAGNOSIS — K70.2 ALCOHOLIC FIBROSIS AND SCLEROSIS OF LIVER: ICD-10-CM

## 2023-04-26 LAB
ALBUMIN SERPL-MCNC: 3.6 G/DL (ref 3.5–5.2)
ALBUMIN/GLOB SERPL: 1 G/DL
ALP SERPL-CCNC: 194 U/L (ref 39–117)
ALT SERPL W P-5'-P-CCNC: 30 U/L (ref 1–41)
ANION GAP SERPL CALCULATED.3IONS-SCNC: 9 MMOL/L (ref 5–15)
AST SERPL-CCNC: 32 U/L (ref 1–40)
BASOPHILS # BLD AUTO: 0 10*3/MM3 (ref 0–0.2)
BASOPHILS NFR BLD AUTO: 0.5 % (ref 0–1.5)
BILIRUB SERPL-MCNC: 1.5 MG/DL (ref 0–1.2)
BUN SERPL-MCNC: 26 MG/DL (ref 6–20)
BUN/CREAT SERPL: 17 (ref 7–25)
CALCIUM SPEC-SCNC: 8.8 MG/DL (ref 8.6–10.5)
CHLORIDE SERPL-SCNC: 104 MMOL/L (ref 98–107)
CO2 SERPL-SCNC: 22 MMOL/L (ref 22–29)
CREAT SERPL-MCNC: 1.53 MG/DL (ref 0.76–1.27)
DEPRECATED RDW RBC AUTO: 49.4 FL (ref 37–54)
EGFRCR SERPLBLD CKD-EPI 2021: 60.1 ML/MIN/1.73
EOSINOPHIL # BLD AUTO: 0.1 10*3/MM3 (ref 0–0.4)
EOSINOPHIL NFR BLD AUTO: 3.9 % (ref 0.3–6.2)
ERYTHROCYTE [DISTWIDTH] IN BLOOD BY AUTOMATED COUNT: 15.3 % (ref 12.3–15.4)
ETHANOL UR QL: <0.01 %
GLOBULIN UR ELPH-MCNC: 3.5 GM/DL
GLUCOSE SERPL-MCNC: 80 MG/DL (ref 65–99)
HCT VFR BLD AUTO: 32.6 % (ref 37.5–51)
HGB BLD-MCNC: 10.9 G/DL (ref 13–17.7)
INR PPP: 1.26 (ref 0.93–1.1)
LYMPHOCYTES # BLD AUTO: 0.5 10*3/MM3 (ref 0.7–3.1)
LYMPHOCYTES NFR BLD AUTO: 16.7 % (ref 19.6–45.3)
MCH RBC QN AUTO: 31.3 PG (ref 26.6–33)
MCHC RBC AUTO-ENTMCNC: 33.4 G/DL (ref 31.5–35.7)
MCV RBC AUTO: 93.7 FL (ref 79–97)
MONOCYTES # BLD AUTO: 0.3 10*3/MM3 (ref 0.1–0.9)
MONOCYTES NFR BLD AUTO: 10.3 % (ref 5–12)
NEUTROPHILS NFR BLD AUTO: 2.1 10*3/MM3 (ref 1.7–7)
NEUTROPHILS NFR BLD AUTO: 68.6 % (ref 42.7–76)
NRBC BLD AUTO-RTO: 0.1 /100 WBC (ref 0–0.2)
PLATELET # BLD AUTO: 39 10*3/MM3 (ref 140–450)
PMV BLD AUTO: 7.5 FL (ref 6–12)
POTASSIUM SERPL-SCNC: 5.1 MMOL/L (ref 3.5–5.2)
PROT SERPL-MCNC: 7.1 G/DL (ref 6–8.5)
PROTHROMBIN TIME: 13.3 SECONDS (ref 9.6–11.7)
RBC # BLD AUTO: 3.48 10*6/MM3 (ref 4.14–5.8)
SODIUM SERPL-SCNC: 135 MMOL/L (ref 136–145)
WBC NRBC COR # BLD: 3.1 10*3/MM3 (ref 3.4–10.8)

## 2023-04-26 PROCEDURE — 85025 COMPLETE CBC W/AUTO DIFF WBC: CPT

## 2023-04-26 PROCEDURE — 80053 COMPREHEN METABOLIC PANEL: CPT

## 2023-04-26 PROCEDURE — 36415 COLL VENOUS BLD VENIPUNCTURE: CPT

## 2023-04-26 PROCEDURE — 82077 ASSAY SPEC XCP UR&BREATH IA: CPT

## 2023-04-26 PROCEDURE — 85610 PROTHROMBIN TIME: CPT

## 2023-07-24 ENCOUNTER — TRANSCRIBE ORDERS (OUTPATIENT)
Dept: ADMINISTRATIVE | Facility: HOSPITAL | Age: 38
End: 2023-07-24
Payer: COMMERCIAL

## 2023-07-24 ENCOUNTER — LAB (OUTPATIENT)
Dept: LAB | Facility: HOSPITAL | Age: 38
End: 2023-07-24
Payer: COMMERCIAL

## 2023-07-24 DIAGNOSIS — K70.31 ALCOHOLIC CIRRHOSIS OF LIVER WITH ASCITES: Primary | ICD-10-CM

## 2023-07-24 DIAGNOSIS — K70.31 ALCOHOLIC CIRRHOSIS OF LIVER WITH ASCITES: ICD-10-CM

## 2023-07-24 LAB
ALBUMIN SERPL-MCNC: 3.5 G/DL (ref 3.5–5.2)
ALBUMIN/GLOB SERPL: 1 G/DL
ALP SERPL-CCNC: 174 U/L (ref 39–117)
ALT SERPL W P-5'-P-CCNC: 40 U/L (ref 1–41)
ANION GAP SERPL CALCULATED.3IONS-SCNC: 9.8 MMOL/L (ref 5–15)
AST SERPL-CCNC: 48 U/L (ref 1–40)
BASOPHILS # BLD AUTO: 0.02 10*3/MM3 (ref 0–0.2)
BASOPHILS NFR BLD AUTO: 0.5 % (ref 0–1.5)
BILIRUB SERPL-MCNC: 1.7 MG/DL (ref 0–1.2)
BUN SERPL-MCNC: 23 MG/DL (ref 6–20)
BUN/CREAT SERPL: 13.6 (ref 7–25)
CALCIUM SPEC-SCNC: 8.6 MG/DL (ref 8.6–10.5)
CHLORIDE SERPL-SCNC: 104 MMOL/L (ref 98–107)
CO2 SERPL-SCNC: 21.2 MMOL/L (ref 22–29)
CREAT SERPL-MCNC: 1.69 MG/DL (ref 0.76–1.27)
DEPRECATED RDW RBC AUTO: 46.7 FL (ref 37–54)
EGFRCR SERPLBLD CKD-EPI 2021: 53.3 ML/MIN/1.73
EOSINOPHIL # BLD AUTO: 0.2 10*3/MM3 (ref 0–0.4)
EOSINOPHIL NFR BLD AUTO: 5.2 % (ref 0.3–6.2)
ERYTHROCYTE [DISTWIDTH] IN BLOOD BY AUTOMATED COUNT: 14.4 % (ref 12.3–15.4)
ETHANOL UR QL: <0.01 %
GLOBULIN UR ELPH-MCNC: 3.5 GM/DL
GLUCOSE SERPL-MCNC: 82 MG/DL (ref 65–99)
HCT VFR BLD AUTO: 31.4 % (ref 37.5–51)
HGB BLD-MCNC: 11.1 G/DL (ref 13–17.7)
INR PPP: 1.23 (ref 0.93–1.1)
LYMPHOCYTES # BLD AUTO: 0.54 10*3/MM3 (ref 0.7–3.1)
LYMPHOCYTES NFR BLD AUTO: 14.1 % (ref 19.6–45.3)
MCH RBC QN AUTO: 31.8 PG (ref 26.6–33)
MCHC RBC AUTO-ENTMCNC: 35.4 G/DL (ref 31.5–35.7)
MCV RBC AUTO: 90 FL (ref 79–97)
MONOCYTES # BLD AUTO: 0.31 10*3/MM3 (ref 0.1–0.9)
MONOCYTES NFR BLD AUTO: 8.1 % (ref 5–12)
NEUTROPHILS NFR BLD AUTO: 2.75 10*3/MM3 (ref 1.7–7)
NEUTROPHILS NFR BLD AUTO: 71.8 % (ref 42.7–76)
PLAT MORPH BLD: NORMAL
PLATELET # BLD AUTO: 36 10*3/MM3 (ref 140–450)
PMV BLD AUTO: 10.6 FL (ref 6–12)
POTASSIUM SERPL-SCNC: 4.8 MMOL/L (ref 3.5–5.2)
PROT SERPL-MCNC: 7 G/DL (ref 6–8.5)
PROTHROMBIN TIME: 13 SECONDS (ref 9.6–11.7)
RBC # BLD AUTO: 3.49 10*6/MM3 (ref 4.14–5.8)
RBC MORPH BLD: NORMAL
SODIUM SERPL-SCNC: 135 MMOL/L (ref 136–145)
WBC MORPH BLD: NORMAL
WBC NRBC COR # BLD: 3.83 10*3/MM3 (ref 3.4–10.8)

## 2023-07-24 PROCEDURE — 36415 COLL VENOUS BLD VENIPUNCTURE: CPT

## 2023-07-24 PROCEDURE — 85025 COMPLETE CBC W/AUTO DIFF WBC: CPT

## 2023-07-24 PROCEDURE — 82077 ASSAY SPEC XCP UR&BREATH IA: CPT

## 2023-07-24 PROCEDURE — 80053 COMPREHEN METABOLIC PANEL: CPT

## 2023-07-24 PROCEDURE — 85610 PROTHROMBIN TIME: CPT

## 2023-07-24 PROCEDURE — 85007 BL SMEAR W/DIFF WBC COUNT: CPT

## (undated) DEVICE — PK ENDO GI 50

## (undated) DEVICE — BITEBLOCK ENDO W/STRAP 60F A/ LF DISP